# Patient Record
Sex: FEMALE | Race: WHITE | NOT HISPANIC OR LATINO
[De-identification: names, ages, dates, MRNs, and addresses within clinical notes are randomized per-mention and may not be internally consistent; named-entity substitution may affect disease eponyms.]

---

## 2017-03-09 ENCOUNTER — RESULT REVIEW (OUTPATIENT)
Age: 82
End: 2017-03-09

## 2017-03-10 ENCOUNTER — OUTPATIENT (OUTPATIENT)
Dept: OUTPATIENT SERVICES | Facility: HOSPITAL | Age: 82
LOS: 1 days | End: 2017-03-10
Payer: MEDICARE

## 2017-03-10 DIAGNOSIS — D13.2 BENIGN NEOPLASM OF DUODENUM: ICD-10-CM

## 2017-03-10 PROCEDURE — 43239 EGD BIOPSY SINGLE/MULTIPLE: CPT | Mod: XS

## 2017-03-10 PROCEDURE — 43251 EGD REMOVE LESION SNARE: CPT

## 2017-03-13 LAB — SURGICAL PATHOLOGY STUDY: SIGNIFICANT CHANGE UP

## 2017-06-05 ENCOUNTER — APPOINTMENT (OUTPATIENT)
Dept: ENDOCRINOLOGY | Facility: CLINIC | Age: 82
End: 2017-06-05

## 2017-06-05 VITALS
HEART RATE: 65 BPM | HEIGHT: 65 IN | BODY MASS INDEX: 23.16 KG/M2 | OXYGEN SATURATION: 98 % | SYSTOLIC BLOOD PRESSURE: 152 MMHG | WEIGHT: 139 LBS | DIASTOLIC BLOOD PRESSURE: 56 MMHG

## 2017-12-07 ENCOUNTER — APPOINTMENT (OUTPATIENT)
Dept: ENDOCRINOLOGY | Facility: CLINIC | Age: 82
End: 2017-12-07
Payer: MEDICARE

## 2017-12-07 VITALS — WEIGHT: 132 LBS | HEIGHT: 64.25 IN | BODY MASS INDEX: 22.53 KG/M2

## 2017-12-07 VITALS — SYSTOLIC BLOOD PRESSURE: 140 MMHG | DIASTOLIC BLOOD PRESSURE: 68 MMHG | OXYGEN SATURATION: 99 % | HEART RATE: 72 BPM

## 2017-12-07 PROCEDURE — 77080 DXA BONE DENSITY AXIAL: CPT | Mod: GA

## 2017-12-07 PROCEDURE — 99214 OFFICE O/P EST MOD 30 MIN: CPT | Mod: 25

## 2017-12-07 PROCEDURE — ZZZZZ: CPT

## 2017-12-07 RX ORDER — PNV NO.95/FERROUS FUM/FOLIC AC 28MG-0.8MG
TABLET ORAL
Refills: 0 | Status: ACTIVE | COMMUNITY

## 2018-01-03 ENCOUNTER — RX RENEWAL (OUTPATIENT)
Age: 83
End: 2018-01-03

## 2018-01-18 ENCOUNTER — APPOINTMENT (OUTPATIENT)
Dept: RHEUMATOLOGY | Facility: CLINIC | Age: 83
End: 2018-01-18
Payer: MEDICARE

## 2018-01-18 PROCEDURE — 96374 THER/PROPH/DIAG INJ IV PUSH: CPT

## 2018-12-11 ENCOUNTER — APPOINTMENT (OUTPATIENT)
Dept: ENDOCRINOLOGY | Facility: CLINIC | Age: 83
End: 2018-12-11
Payer: MEDICARE

## 2018-12-11 VITALS — SYSTOLIC BLOOD PRESSURE: 130 MMHG | OXYGEN SATURATION: 97 % | DIASTOLIC BLOOD PRESSURE: 74 MMHG | HEART RATE: 70 BPM

## 2018-12-11 VITALS — HEIGHT: 64.25 IN | BODY MASS INDEX: 22.88 KG/M2 | WEIGHT: 134 LBS

## 2018-12-11 PROCEDURE — 99214 OFFICE O/P EST MOD 30 MIN: CPT | Mod: 25

## 2018-12-11 PROCEDURE — 77080 DXA BONE DENSITY AXIAL: CPT | Mod: GA

## 2019-12-04 ENCOUNTER — APPOINTMENT (OUTPATIENT)
Dept: ENDOCRINOLOGY | Facility: CLINIC | Age: 84
End: 2019-12-04
Payer: MEDICARE

## 2019-12-04 VITALS
SYSTOLIC BLOOD PRESSURE: 120 MMHG | BODY MASS INDEX: 22.47 KG/M2 | HEART RATE: 79 BPM | DIASTOLIC BLOOD PRESSURE: 70 MMHG | HEIGHT: 63.9 IN | OXYGEN SATURATION: 98 % | WEIGHT: 130 LBS

## 2019-12-04 PROCEDURE — 77080 DXA BONE DENSITY AXIAL: CPT | Mod: GA

## 2019-12-04 PROCEDURE — ZZZZZ: CPT

## 2019-12-04 PROCEDURE — 99214 OFFICE O/P EST MOD 30 MIN: CPT | Mod: 25

## 2019-12-06 NOTE — PROCEDURE
[FreeTextEntry1] : Bone mineral density: 12/04/2019 \par Indication: vs. 2018 assess response to medication\par Spine: (L1-L3) -2.2 osteopenia +7.3% suspect arthritis\par Total hip: -2.7 osteopenia, no significant change\par Femoral neck: -2.6 osteoporosis, no significant change\par Proximal radius: -2.8 osteoporosis, no significant change\par \par Bone mineral density: 12/11/2018 \par Indication: vs. 2017 assess response to medication \par Spine: (L1-L3) -2.7 osteoporosis, no significant change \par Total hip: -2.7 osteoporosis, no significant change \par Femoral neck: -2.8 osteoporosis, no significant change \par Proximal radius: -2.6 osteoporosis, no significant change \par \par Bone mineral density 12/7/17\par indication: assess response to medication \par spine -2.6,osteoporosis, appears improved compared to prior outside study\par total hip  -2.7, osteoporosis, no significant change \par femoral neck -2.7 osteoporosis, no significant change \par proximal radius -2.9 osteoporosis No prior

## 2019-12-06 NOTE — END OF VISIT
[FreeTextEntry3] : I, Candido Mcallister, authored this note working as a medical scribe for Dr. Wellington.  12/04/2019.  3:30PM. This note was authored by the medical scribe for me. I have reviewed, edited, and revised the note as needed. I am in agreement with the exam findings, imaging findings, and treatment plan.  Jona Wellington MD

## 2019-12-06 NOTE — HISTORY OF PRESENT ILLNESS
[Zoledronic Acid (Zometa)] : Zoledronic Acid [Calcium (supplements)] : calcium from a dietary supplement [Vitamin D (supplements)] : Vitamin D as a dietary supplement [Patient taking Meds Correctly] : Patient is taking meds correctly [Kidney Stones] : a history of kidney stones [Regular Dental Follow-Up] : regular dental follow-up [FreeTextEntry1] : f/u 89 year old female with osteoporosis.\par \par Pt appears to have had low bone density for several years, at least since 2008, though she was not treated at that time. No h/o fx. Repeat bone density 2016 was not read correctly as they included lumbar spine 4 which is clearly falsely positive on my review. . L1-L3 are consistent with osteoporosis. L fem neck: -2.2 R fem neck: -2.5 L tot hip: -2.5 R tot hip:-2.7. Pt had a single episode of kidney stone 1986 with no recurrence. Pt currently has active GERD. Had first dose of Reclast 12/2016, 2nd dose of IV Reclast in Jan 2018. Taking correctly, tolerating well. No thigh pain, no interval fx, no APR. No ONJ.\par \par Endocrine hx is notable for hypothyroidism for many years currently on LT4 125 mcg. No sx of hypothyroidism or hyperthyroidism on this dose. No raciness, shakiness, heat/cold intolerance, tiredness, or fatigue. [Disordered Eating] : no past or present history of disordered eating [Taking Steroids] : no past or present history of taking steroids [Family History of Osteoporosis] : no family history of osteoporosis [Family History of Breast Cancer] : no family history of breast cancer [Family History of Hip Fracture] : no family history of hip fracture [Hyperparathyroidism] : no hyperparathyroidism [History of Radiation Therapy] : no history of radiation therapy [Previous Fragility Fracture] : no previous fragility fracture

## 2019-12-06 NOTE — ASSESSMENT
[Bisphosphonate Therapy] : Risks  and benefits of bisphosphonate therapy were  discussed with the patient including gastroesophageal irritation, osteonecrosis of the jaw, and atypical femur fractures, and acute phase reaction [FreeTextEntry1] : 88 y/o female with \par \par 1. Osteoporosis: Had one dose of Reclast 12/2016, tolerated well. No APR. Still has active reflux disease . No interval fx. Bone mineral density 2017 appeared improved in spine and stable in the hip. Pt elected to take a second dose of IV Reclast in Jan 2018. Repeat BMD 12/2018 indicates stability in all sites. BMD 12/2019 indicates low but stable osteoporosis in femoral neck and proximal radius and osteopenia in spine and total hip. Recommend the pt receive another dose of IV Reclast. Risks and benefits discussed. All questions were answered. Pt agrees and understands to receive next dose of IV Reclast. Prescription sent out.\par \par 2. Hypothyroidism: clinically and chemically euthyroid on LT4 125. No raciness, shakiness, heat/cold intolerance, tiredness, or fatigue.\par \par Labs from Dr. Moe.\par \par f/u in 1 year

## 2019-12-06 NOTE — PHYSICAL EXAM
[Alert] : alert [No Acute Distress] : no acute distress [Well Nourished] : well nourished [Well Developed] : well developed [Normal Sclera/Conjunctiva] : normal sclera/conjunctiva [EOMI] : extra ocular movement intact [No Proptosis] : no proptosis [Thyroid Not Enlarged] : the thyroid was not enlarged [Normal Oropharynx] : the oropharynx was normal [No Thyroid Nodules] : there were no palpable thyroid nodules [No Respiratory Distress] : no respiratory distress [No Accessory Muscle Use] : no accessory muscle use [Clear to Auscultation] : lungs were clear to auscultation bilaterally [Murmurs] : no murmurs [No Edema] : there was no peripheral edema [Normal Bowel Sounds] : normal bowel sounds [Not Tender] : non-tender [Soft] : abdomen soft [Post Cervical Nodes] : posterior cervical nodes [Not Distended] : not distended [Anterior Cervical Nodes] : anterior cervical nodes [Normal] : normal and non tender [Kyphosis] : kyphosis present [No Spinal Tenderness] : no spinal tenderness [No Stigmata of Cushings Syndrome] : no stigmata of cushings syndrome [Normal Gait] : normal gait [Normal Strength/Tone] : muscle strength and tone were normal [No Rash] : no rash [Normal Reflexes] : deep tendon reflexes were 2+ and symmetric [No Tremors] : no tremors [Oriented x3] : oriented to person, place, and time [Acanthosis Nigricans] : no acanthosis nigricans [de-identified] : elderly female [de-identified] : 2/6 systolic murmur [de-identified] : ribs in pelvis, kyphosis

## 2020-02-25 ENCOUNTER — APPOINTMENT (OUTPATIENT)
Dept: RHEUMATOLOGY | Facility: CLINIC | Age: 85
End: 2020-02-25
Payer: MEDICARE

## 2020-02-25 PROCEDURE — 96374 THER/PROPH/DIAG INJ IV PUSH: CPT

## 2020-12-02 ENCOUNTER — APPOINTMENT (OUTPATIENT)
Dept: ENDOCRINOLOGY | Facility: CLINIC | Age: 85
End: 2020-12-02
Payer: MEDICARE

## 2020-12-02 VITALS
DIASTOLIC BLOOD PRESSURE: 70 MMHG | TEMPERATURE: 98 F | HEART RATE: 65 BPM | BODY MASS INDEX: 21.62 KG/M2 | WEIGHT: 122 LBS | HEIGHT: 63 IN | SYSTOLIC BLOOD PRESSURE: 130 MMHG | OXYGEN SATURATION: 98 %

## 2020-12-02 PROCEDURE — 99214 OFFICE O/P EST MOD 30 MIN: CPT

## 2020-12-03 NOTE — ASSESSMENT
[Bisphosphonate Therapy] : Risks  and benefits of bisphosphonate therapy were  discussed with the patient including gastroesophageal irritation, osteonecrosis of the jaw, and atypical femur fractures, and acute phase reaction [FreeTextEntry1] : 89 y/o female with \par \par 1. Osteoporosis: Had one dose of Reclast 12/2016, tolerated well. No APR. Still has active reflux disease . No interval fx. Bone mineral density 2017 appeared improved in spine and stable in the hip. Pt elected to take a second dose of IV Reclast in Jan 2018. Repeat BMD 12/2018 indicates stability in all sites. BMD 12/2019 indicated low but stable osteoporosis in femoral neck and proximal radius and osteopenia in spine and total hip. \par She needs third dose of intravenous Reclast February 2020.  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 Hypothyroidism: clinically and chemically euthyroid on LT4 125. No raciness, shakiness, heat/cold intolerance, tiredness, or fatigue.\par \par Labs from Dr. Moe.\par \par f/u in 6 mos repeat BMD next visit

## 2020-12-03 NOTE — HISTORY OF PRESENT ILLNESS
[Zoledronic Acid (Zometa)] : Zoledronic Acid [Calcium (supplements)] : calcium from a dietary supplement [Vitamin D (supplements)] : Vitamin D as a dietary supplement [Patient taking Meds Correctly] : Patient is taking meds correctly [Kidney Stones] : a history of kidney stones [Regular Dental Follow-Up] : regular dental follow-up [FreeTextEntry1] : \par Pt appears to have had low bone density for several years, at least since 2008, though she was not treated at that time. No h/o fx. Repeat bone density 2016 was not read correctly as they included lumbar spine 4 which is clearly falsely positive on my review. . L1-L3 are consistent with osteoporosis. L fem neck: -2.2 R fem neck: -2.5 L tot hip: -2.5 R tot hip:-2.7. Pt had a single episode of kidney stone 1986 with no recurrence. Pt currently has active GERD. Had first dose of Reclast 12/2016, 2nd dose of IV Reclast in Jan 2018.  ,\par \par Reclast Feb 2020  tolerating well. No thigh pain, no interval fx, no APR. No ONJ.\par Endocrine hx is notable for hypothyroidism for many years currently on LT4 125 mcg. No sx of hypothyroidism or hyperthyroidism on this dose. No raciness, shakiness, heat/cold intolerance, tiredness, or fatigue. [Disordered Eating] : no past or present history of disordered eating [Taking Steroids] : no past or present history of taking steroids [Family History of Osteoporosis] : no family history of osteoporosis [Family History of Breast Cancer] : no family history of breast cancer [Family History of Hip Fracture] : no family history of hip fracture [Hyperparathyroidism] : no hyperparathyroidism [History of Radiation Therapy] : no history of radiation therapy [Previous Fragility Fracture] : no previous fragility fracture

## 2020-12-03 NOTE — PHYSICAL EXAM
[Alert] : alert [Well Nourished] : well nourished [No Acute Distress] : no acute distress [Well Developed] : well developed [Normal Sclera/Conjunctiva] : normal sclera/conjunctiva [EOMI] : extra ocular movement intact [No Proptosis] : no proptosis [Thyroid Not Enlarged] : the thyroid was not enlarged [No Thyroid Nodules] : no palpable thyroid nodules [Clear to Auscultation] : lungs were clear to auscultation bilaterally [Normal S1, S2] : normal S1 and S2 [Normal Rate] : heart rate was normal [Regular Rhythm] : with a regular rhythm [No Edema] : no peripheral edema [Pedal Pulses Normal] : the pedal pulses are present [Normal Bowel Sounds] : normal bowel sounds [Not Tender] : non-tender [Not Distended] : not distended [Soft] : abdomen soft [Normal Anterior Cervical Nodes] : no anterior cervical lymphadenopathy [Normal Posterior Cervical Nodes] : no posterior cervical lymphadenopathy [No Spinal Tenderness] : no spinal tenderness [No Stigmata of Cushings Syndrome] : no stigmata of Cushings Syndrome [Normal Gait] : normal gait [Normal Strength/Tone] : muscle strength and tone were normal [No Rash] : no rash [Acanthosis Nigricans] : no acanthosis nigricans [Normal Reflexes] : deep tendon reflexes were 2+ and symmetric [No Tremors] : no tremors [Oriented x3] : oriented to person, place, and time [de-identified] : Elderly female [de-identified] : 2/6 systolic murmur [de-identified] : Severe kyphosis ribs inside pelvis

## 2021-06-08 ENCOUNTER — APPOINTMENT (OUTPATIENT)
Dept: ENDOCRINOLOGY | Facility: CLINIC | Age: 86
End: 2021-06-08
Payer: MEDICARE

## 2021-06-08 VITALS
SYSTOLIC BLOOD PRESSURE: 160 MMHG | TEMPERATURE: 98.4 F | WEIGHT: 128 LBS | OXYGEN SATURATION: 98 % | DIASTOLIC BLOOD PRESSURE: 62 MMHG | HEIGHT: 62.6 IN | HEART RATE: 63 BPM | BODY MASS INDEX: 22.97 KG/M2

## 2021-06-08 PROCEDURE — 99072 ADDL SUPL MATRL&STAF TM PHE: CPT

## 2021-06-08 PROCEDURE — ZZZZZ: CPT

## 2021-06-08 PROCEDURE — 77080 DXA BONE DENSITY AXIAL: CPT

## 2021-06-08 PROCEDURE — 99214 OFFICE O/P EST MOD 30 MIN: CPT | Mod: 25

## 2021-06-08 NOTE — HISTORY OF PRESENT ILLNESS
[Zoledronic Acid (Zometa)] : Zoledronic Acid [Calcium (supplements)] : calcium from a dietary supplement [Vitamin D (supplements)] : Vitamin D as a dietary supplement [Kidney Stones] : a history of kidney stones [Regular Dental Follow-Up] : regular dental follow-up [FreeTextEntry1] : No significant interval health changes. No interval surgery, hospitalizations, fractures, or change in medications.\par \par Pt appears to have had low bone density for several years, at least since 2008, though she was not treated at that time. No h/o fx. Repeat bone density 2016 was not read correctly as they included lumbar spine 4 which is clearly falsely positive on my review. . L1-L3 are consistent with osteoporosis. L fem neck: -2.2 R fem neck: -2.5 L tot hip: -2.5 R tot hip:-2.7. Pt had a single episode of kidney stone 1986 with no recurrence. Pt currently has active GERD. Had first dose of Reclast 12/2016, 2nd dose of IV Reclast in Jan 2018. Pt had third dose of intravenous Reclast February 2020. Tolerated well. No thigh pain, no interval fx, no APR. Last DDS within past 6 months. No ONJ. MRI 8/2020 indicates L2 compression fx. Pt still c/o back pain.\par \par Endocrine hx is notable for hypothyroidism for many years currently on LT4 125 mcg daily.. No sx of hypothyroidism or hyperthyroidism on this No local neck pain. No dysphagia or dysphonia. No raciness, shakiness, heat/cold intolerance, tiredness, or fatigue. No palpitations, tremors, or sudden weight gain/loss. [Disordered Eating] : no past or present history of disordered eating [Taking Steroids] : no past or present history of taking steroids [Family History of Osteoporosis] : no family history of osteoporosis [Family History of Breast Cancer] : no family history of breast cancer [Family History of Hip Fracture] : no family history of hip fracture [Hyperparathyroidism] : no hyperparathyroidism [History of Radiation Therapy] : no history of radiation therapy [Previous Fragility Fracture] : no previous fragility fracture

## 2021-06-08 NOTE — END OF VISIT
[FreeTextEntry3] : I, Candido Mcallister, authored this note working as a medical scribe for Dr. Wellington.  06/08/2021. 11:30AM. This note was authored by the medical scribe for me. I have reviewed, edited, and revised the note as needed. I am in agreement with the exam findings, imaging findings, and treatment plan.  Jona Wellington MD

## 2021-06-08 NOTE — PROCEDURE
[FreeTextEntry1] : Bone mineral density: 06/08/2021 \par Indication: vs. 2019 assess response to medication\par Spine: not performed\par Total hip: -3.0 osteoporosis, -5.8%\par Femoral neck: -3.0 osteoporosis, -8.5%\par Proximal radius: -2.6 osteoporosis, no significant change\par \par Bone mineral density: 12/04/2019 \par Indication: vs. 2018 assess response to medication\par Spine: (L1-L3) -2.2 osteopenia +7.3% suspect arthritis\par Total hip: -2.7 osteopenia, no significant change\par Femoral neck: -2.6 osteoporosis, no significant change\par Proximal radius: -2.8 osteoporosis, no significant change\par \par Bone mineral density: 12/11/2018 \par Indication: vs. 2017 assess response to medication \par Spine: (L1-L3) -2.7 osteoporosis, no significant change \par Total hip: -2.7 osteoporosis, no significant change \par Femoral neck: -2.8 osteoporosis, no significant change \par Proximal radius: -2.6 osteoporosis, no significant change \par \par Bone mineral density 12/7/17\par indication: assess response to medication \par spine -2.6,osteoporosis, appears improved compared to prior outside study\par total hip  -2.7, osteoporosis, no significant change \par femoral neck -2.7 osteoporosis, no significant change \par proximal radius -2.9 osteoporosis No prior

## 2021-06-08 NOTE — ASSESSMENT
[Denosumab Therapy] : Risks  and benefits of denosumab therapy were discussed with the patient including eczema, cellulitis, osteonecrosis of the jaw and atypical femur fractures [FreeTextEntry1] : 89 y/o female with \par \par 1. Osteoporosis: Had one dose of Reclast 12/2016, tolerated well. No APR. Still has active reflux disease . No interval fx. Bone mineral density 2017 appeared improved in spine and stable in the hip. Pt elected to take a second dose of IV Reclast in Jan 2018. Repeat BMD 12/2018 indicates stability in all sites. BMD 12/2019 indicated low but stable osteoporosis in femoral neck and proximal radius and osteopenia in spine and total hip. Pt had third dose of intravenous Reclast February 2020. Tolerated well. No thigh pain, no interval fx, no APR. No ONJ. BMD 6/2021 indicates worsened low osteoporosis in hip and stable osteoporosis in proximal radius. BMD results reviewed w/ pt.\par \par Options of therapy discussed. Discussed transitioning to twice a year Prolia. Risks and benefits discussed including thigh pain, interval fx, and ONJ. I discussed that pt cannot stop Prolia without expecting rapid bone loss and increase in risk for future fx unless they transition to another rx. Furthermore, there is 10 year safety data for Prolia. All questions were answered. Rx information handout provided. Pt understands and elects to start Prolia. Pt will have shot in [email or web address] office as it is closer to her home.\par \par 2. 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 Hypothyroidism: clinically and chemically euthyroid on LT4 125 mcg daily. No local neck pain. No dysphagia or dysphonia. No raciness, shakiness, heat/cold intolerance, tiredness, or fatigue. No palpitations, tremors, or sudden weight gain/loss. \par Call Dr. Moe for labs.  [phone]\par \par F/u in 1 month at [facility] for Prolia.

## 2021-06-08 NOTE — PHYSICAL EXAM
[Alert] : alert [Well Nourished] : well nourished [No Acute Distress] : no acute distress [Well Developed] : well developed [Normal Sclera/Conjunctiva] : normal sclera/conjunctiva [EOMI] : extra ocular movement intact [No Proptosis] : no proptosis [Thyroid Not Enlarged] : the thyroid was not enlarged [No Thyroid Nodules] : no palpable thyroid nodules [Clear to Auscultation] : lungs were clear to auscultation bilaterally [Normal S1, S2] : normal S1 and S2 [Normal Rate] : heart rate was normal [Regular Rhythm] : with a regular rhythm [No Edema] : no peripheral edema [Normal Bowel Sounds] : normal bowel sounds [Not Tender] : non-tender [Not Distended] : not distended [Soft] : abdomen soft [Normal Anterior Cervical Nodes] : no anterior cervical lymphadenopathy [No Spinal Tenderness] : no spinal tenderness [Kyphosis] : kyphosis present [No Stigmata of Cushings Syndrome] : no stigmata of Cushings Syndrome [Normal Gait] : normal gait [Normal Reflexes] : deep tendon reflexes were 2+ and symmetric [No Tremors] : no tremors [Oriented x3] : oriented to person, place, and time [de-identified] : Elderly female [de-identified] : 2/6 systolic murmur [de-identified] : Severe kyphosis, ribs inside pelvis

## 2021-06-29 ENCOUNTER — APPOINTMENT (OUTPATIENT)
Dept: ENDOCRINOLOGY | Facility: CLINIC | Age: 86
End: 2021-06-29
Payer: MEDICARE

## 2021-06-29 VITALS
TEMPERATURE: 98 F | SYSTOLIC BLOOD PRESSURE: 169 MMHG | HEART RATE: 62 BPM | WEIGHT: 124 LBS | DIASTOLIC BLOOD PRESSURE: 65 MMHG | BODY MASS INDEX: 22.25 KG/M2 | HEIGHT: 62.6 IN

## 2021-06-29 PROCEDURE — 99072 ADDL SUPL MATRL&STAF TM PHE: CPT

## 2021-06-29 PROCEDURE — 96401 CHEMO ANTI-NEOPL SQ/IM: CPT

## 2021-06-29 NOTE — PROCEDURE
[FreeTextEntry1] : Pt. seen by Dr. Wellington here for Prolia injection. Given today without complications. Pt. will follow-up with Dr. Wellington in 6 months.

## 2023-05-24 ENCOUNTER — APPOINTMENT (OUTPATIENT)
Dept: WOUND CARE | Facility: CLINIC | Age: 88
End: 2023-05-24
Payer: COMMERCIAL

## 2023-05-24 ENCOUNTER — OUTPATIENT (OUTPATIENT)
Dept: OUTPATIENT SERVICES | Facility: HOSPITAL | Age: 88
LOS: 1 days | End: 2023-05-24
Payer: MEDICARE

## 2023-05-24 VITALS
HEART RATE: 60 BPM | SYSTOLIC BLOOD PRESSURE: 143 MMHG | OXYGEN SATURATION: 99 % | TEMPERATURE: 97.2 F | DIASTOLIC BLOOD PRESSURE: 66 MMHG | RESPIRATION RATE: 16 BRPM
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
## 2023-05-24 DIAGNOSIS — Z87.81 PERSONAL HISTORY OF (HEALED) TRAUMATIC FRACTURE: ICD-10-CM

## 2023-05-24 DIAGNOSIS — S81.802A UNSPECIFIED OPEN WOUND, LEFT LOWER LEG, INITIAL ENCOUNTER: ICD-10-CM
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
## 2023-05-24 PROCEDURE — 11042 DBRDMT SUBQ TIS 1ST 20SQCM/<: CPT

## 2023-05-24 PROCEDURE — 29581 APPL MULTLAYER CMPRN SYS LEG: CPT | Mod: LT

## 2023-05-24 PROCEDURE — 99205 OFFICE O/P NEW HI 60 MIN: CPT | Mod: 25

## 2023-05-24 PROCEDURE — G0463: CPT | Mod: 25

## 2023-05-24 NOTE — HISTORY OF PRESENT ILLNESS
[FreeTextEntry1] : Ms. VASU CARBAJAL   presents to the office with a wound since 5/21/23.  She was washing her windows when she fell off a milkcrate. The wound is located on  the LLE .  The patient has complaints of poor wound healing   The patient has been dressing the wound with silvadene.  The patient denies fevers or chills.  The patient has localized pain to the wound upon dressing changes.  The patient has no other complaints or associated symptoms.  .\par

## 2023-05-24 NOTE — PHYSICAL EXAM
[Normal Breath Sounds] : Normal breath sounds [2+] : left 2+ [Ankle Swelling (On Exam)] : present [Ankle Swelling Bilaterally] : bilaterally  [Skin Ulcer] : ulcer [Alert] : alert [Oriented to Person] : oriented to person [Oriented to Place] : oriented to place [Oriented to Time] : oriented to time [Calm] : calm [Please See PDF for [employer]] : Please See PDF for [employer]. [JVD] : no jugular venous distention  [Abdomen Tenderness] : ~T ~M No abdominal tenderness [de-identified] : NAD, ambulatory [de-identified] : AT [de-identified] : supple [de-identified] : soft

## 2023-05-24 NOTE — ASSESSMENT
[FreeTextEntry1] : Wound Assessment and Plan:\par \par The patient presents with a wound to the LLE.  Swelling noted to the extremity.  h/o compression stocking use  h/o varicose veins and BLLE knee replacement.\par ROLAND/PVR and standing venous reflux study scheduled.\par No clinical sign of infection\par Recommendation:\par \par Apply lidocaine or topical anesthetic if needed to reduce pain upon washing the wound.\par Wash wound with ----VASHE or Dove skin sensitive soap and clean water \par Apply ---medical grade honey/\par Apply ----multi-layer compression bandage/ compression stocking/ ACE bandage\par Change dressing ---daily\par Leg elevation as tolerated\par Encouraged ambulation or exercise.\par Optimization of nutrition.\par Offloading to the wound site.\par \par \par -----Wound supplies ordered via DME\par Patient given contact information to DME\par \par -----Homecare orders in place\par \par Follow up appointment scheduled for 1 week\par \par TeleHealth Services discussed with the patient and/or family.  Discharge instructions given including download of [facility] information regarding:\par 1)  [facility] to obtain medical records\par 2)  [facility] to conduct Face-to-Face TeleHealth visit\par 3)  [employer] for the Patient (patient takes a picture of their wound which is sent to the patient's chart for review)\par

## 2023-06-07 ENCOUNTER — APPOINTMENT (OUTPATIENT)
Dept: WOUND CARE | Facility: CLINIC | Age: 88
End: 2023-06-07
Payer: COMMERCIAL

## 2023-06-07 ENCOUNTER — OUTPATIENT (OUTPATIENT)
Dept: OUTPATIENT SERVICES | Facility: HOSPITAL | Age: 88
LOS: 1 days | End: 2023-06-07
Payer: MEDICARE

## 2023-06-07 VITALS — TEMPERATURE: 97.3 F

## 2023-06-07 PROCEDURE — 99213 OFFICE O/P EST LOW 20 MIN: CPT

## 2023-06-07 PROCEDURE — G0463: CPT

## 2023-06-07 RX ORDER — CLOTRIMAZOLE AND BETAMETHASONE DIPROPIONATE 10; .5 MG/G; MG/G
1-0.05 CREAM TOPICAL TWICE DAILY
Qty: 1 | Refills: 2 | Status: ACTIVE | COMMUNITY
Start: 2023-06-07 | End: 1900-01-01

## 2023-06-07 NOTE — PHYSICAL EXAM
[Normal Breath Sounds] : Normal breath sounds [2+] : left 2+ [Ankle Swelling (On Exam)] : present [Ankle Swelling Bilaterally] : bilaterally  [Skin Ulcer] : ulcer [Alert] : alert [Oriented to Person] : oriented to person [Oriented to Place] : oriented to place [Oriented to Time] : oriented to time [Calm] : calm [Please See PDF for [employer]] : Please See PDF for [employer]. [JVD] : no jugular venous distention  [Abdomen Tenderness] : ~T ~M No abdominal tenderness [de-identified] : NAD, ambulatory [de-identified] : AT [de-identified] : supple [de-identified] : soft

## 2023-06-07 NOTE — ASSESSMENT
[FreeTextEntry1] : Wound Assessment and Plan:\par \par The patient presents with a wound to the LLE.  Swelling noted to the extremity.  h/o compression stocking use  h/o varicose veins and BLLE knee replacement.\par ROLAND/PVR and standing venous reflux study scheduled.\par No clinical sign of infection\par Recommendation:\par \par Apply lidocaine or topical anesthetic if needed to reduce pain upon washing the wound.\par Wash wound with ----VASHE or Dove skin sensitive soap and clean water \par Apply ---medical grade honey/\par Apply ----multi-layer compression bandage/ compression stocking/ ACE bandage\par Change dressing ---daily\par Leg elevation as tolerated\par Encouraged ambulation or exercise.\par Optimization of nutrition.\par Offloading to the wound site.\par \par \par -----Wound supplies ordered via DME\par Patient given contact information to [facility]\par \par \par -----Homecare orders in place\par \par Follow up appointment scheduled for 1 week\par \par TeleHealth Services discussed with the patient and/or family.  Discharge instructions given including download of [facility] information regarding:\par 1)  [facility] to obtain medical records\par 2)  [facility] to conduct Face-to-Face TeleHealth visit\par 3)  [employer] for the Patient (patient takes a picture of their wound which is sent to the patient's chart for review)\par \par \par 06/07/2023\par The patient follow  with a wound to the LLE.  Swelling noted to the extremity. h/o varicose veins and BLLE knee replacement.\par Tendon exposed in the wound, maceration noted to periwound\par S/p mechanical debridment\par /\par The patient was positioned to allow for optimization of imaging. The fluorescence imaging device was placed 8-12 cm from the patient and the clinical darkness required for imaging was obtained by a dark drape. The wound measured () on the () . The Sapheon i:X fluorescence imaging device was used to report presence and location of cyan fluorescence, indicative of bacteria at loads greater than 104 CFU/g in real-time. Images reported to have cyan fluorescence. The wound was mechanically cleansed with Dakins 0.125% and underwent excisional debridement. Fluorescence images acquired after cleansing demonstrated reduction in bacteria.\par \par Vascular studies ordered\par  Plan for  application of (puraply). Wound has shown improvement through (increased granulation and/or decreased size).\par Wound is free from infection, drainage and necrotic tissue. (Patient has adequate blood supply as demonstrated by palpable pulses and an ROLAND of () from (date). \par Wound has been treated with standards of care for (3) weeks including (compression, offloading, debridement’s).\par \par \par

## 2023-06-07 NOTE — PLAN
[FreeTextEntry1] : 06/07/2023\par wash with vashe, apply medihoney, xtrasorb, multilayer dressing\par Follow up on vascular studies\par skin sub ordered\par follow up in 2 weeks

## 2023-06-07 NOTE — REVIEW OF SYSTEMS
Admission Reconciliation is Completed  Discharge Reconciliation is Not Complete Admission Reconciliation is Completed  Discharge Reconciliation is Completed [As Noted in HPI] : as noted in HPI [Skin Lesions] : skin lesion [Skin Wound] : skin wound [Negative] : Heme/Lymph

## 2023-06-09 DIAGNOSIS — I83.893 VARICOSE VEINS OF BILATERAL LOWER EXTREMITIES WITH OTHER COMPLICATIONS: ICD-10-CM

## 2023-06-09 DIAGNOSIS — L97.922 NON-PRESSURE CHRONIC ULCER OF UNSPECIFIED PART OF LEFT LOWER LEG WITH FAT LAYER EXPOSED: ICD-10-CM

## 2023-06-12 ENCOUNTER — APPOINTMENT (OUTPATIENT)
Dept: ENDOCRINOLOGY | Facility: CLINIC | Age: 88
End: 2023-06-12
Payer: MEDICARE

## 2023-06-12 VITALS
HEART RATE: 71 BPM | WEIGHT: 115 LBS | DIASTOLIC BLOOD PRESSURE: 62 MMHG | BODY MASS INDEX: 21.16 KG/M2 | HEIGHT: 62 IN | OXYGEN SATURATION: 96 % | SYSTOLIC BLOOD PRESSURE: 150 MMHG

## 2023-06-12 PROCEDURE — ZZZZZ: CPT

## 2023-06-12 PROCEDURE — 99214 OFFICE O/P EST MOD 30 MIN: CPT | Mod: 25

## 2023-06-12 PROCEDURE — 77080 DXA BONE DENSITY AXIAL: CPT

## 2023-06-12 PROCEDURE — 96401 CHEMO ANTI-NEOPL SQ/IM: CPT

## 2023-06-12 RX ORDER — DENOSUMAB 60 MG/ML
60 INJECTION SUBCUTANEOUS
Qty: 1 | Refills: 0 | Status: COMPLETED | OUTPATIENT
Start: 2023-06-12

## 2023-06-12 RX ADMIN — DENOSUMAB 60 MG/ML: 60 INJECTION SUBCUTANEOUS at 00:00

## 2023-06-12 NOTE — ASSESSMENT
[Denosumab Therapy] : Risks  and benefits of denosumab therapy were discussed with the patient including eczema, cellulitis, osteonecrosis of the jaw and atypical femur fractures [FreeTextEntry1] : 89 y/o female with \par \par 1. Osteoporosis: Had one dose of Reclast 12/2016, tolerated well. No APR. Still has active reflux disease . No interval fx. Bone mineral density 2017 appeared improved in spine and stable in the hip. Pt elected to take a second dose of IV Reclast in Jan 2018. Repeat BMD 12/2018 indicates stability in all sites. BMD 12/2019 indicated low but stable osteoporosis in femoral neck and proximal radius and osteopenia in spine and total hip. Pt had third dose of intravenous Reclast February 2020. Tolerated well. No thigh pain, no interval fx, no APR. No ONJ. BMD 6/2021 indicates worsened low osteoporosis in hip and stable osteoporosis in proximal radius. BMD results reviewed w/ pt.\par The patient had 1 dose of Prolia 2021 but did not return for follow-up.  It is unclear if this was due to concerns about COVID or lack of understanding of need for return.  Current bone density 2023 significantly reduced versus prior studies.\par Patient strongly advised of the need for treatment and for routine to 6-month follow-up of Prolia.  The risk of rapid bone loss if patient misses dose emphasized.\par Prolia buy and bill \par \par 2. 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 Hypothyroidism: clinically and chemically euthyroid on LT4 125 mcg daily. No local neck pain. No dysphagia or dysphonia. No raciness, shakiness, heat/cold intolerance, tiredness, or fatigue. No palpitations, tremors, or sudden weight gain/loss. \par Call Dr. Moe for labs.  [phone]\par Follow-up 6 months for Prolia

## 2023-06-12 NOTE — HISTORY OF PRESENT ILLNESS
[Zoledronic Acid (Zometa)] : Zoledronic Acid [Calcium (supplements)] : calcium from a dietary supplement [Vitamin D (supplements)] : Vitamin D as a dietary supplement [Kidney Stones] : a history of kidney stones [Regular Dental Follow-Up] : regular dental follow-up [FreeTextEntry1] :  Late follow-up for 89-year-old female for osteoporosis.  Patient received Prolia in the [facility]  office June 2021 but has not returned for any treatment since then.  Patient denies any interval fractures.\par \par The patient had a TAVR at  [facility] which apparently went well. \par  Prior history:\par Pt appears to have had low bone density for several years, at least since 2008, though she was not treated at that time. No h/o fx. Repeat bone density 2016 was not read correctly as they included lumbar spine 4 which is clearly falsely positive on my review. . L1-L3 are consistent with osteoporosis. L fem neck: -2.2 R fem neck: -2.5 L tot hip: -2.5 R tot hip:-2.7. Pt had a single episode of kidney stone 1986 with no recurrence. Pt currently has active GERD. Had first dose of Reclast 12/2016, 2nd dose of IV Reclast in Jan 2018. Pt had third dose of intravenous Reclast February 2020. Tolerated well. No thigh pain, no interval fx, no APR. Last DDS within past 6 months. No ONJ. MRI 8/2020 indicates L2 compression fx. Pt still c/o back pain.\par \par Endocrine hx is notable for hypothyroidism for many years currently on LT4 125 mcg daily.. No sx of hypothyroidism or hyperthyroidism on this No local neck pain. No dysphagia or dysphonia. No raciness, shakiness, heat/cold intolerance, tiredness, or fatigue. No palpitations, tremors, or sudden weight gain/loss. [Disordered Eating] : no past or present history of disordered eating [Taking Steroids] : no past or present history of taking steroids [Family History of Osteoporosis] : no family history of osteoporosis [Family History of Breast Cancer] : no family history of breast cancer [Family History of Hip Fracture] : no family history of hip fracture [Hyperparathyroidism] : no hyperparathyroidism [History of Radiation Therapy] : no history of radiation therapy [Previous Fragility Fracture] : no previous fragility fracture

## 2023-06-12 NOTE — PHYSICAL EXAM
[Alert] : alert [Well Nourished] : well nourished [No Acute Distress] : no acute distress [Well Developed] : well developed [Normal Sclera/Conjunctiva] : normal sclera/conjunctiva [EOMI] : extra ocular movement intact [No Proptosis] : no proptosis [Thyroid Not Enlarged] : the thyroid was not enlarged [No Thyroid Nodules] : no palpable thyroid nodules [Clear to Auscultation] : lungs were clear to auscultation bilaterally [Normal S1, S2] : normal S1 and S2 [Normal Rate] : heart rate was normal [Regular Rhythm] : with a regular rhythm [No Edema] : no peripheral edema [Normal Bowel Sounds] : normal bowel sounds [Not Tender] : non-tender [Not Distended] : not distended [Soft] : abdomen soft [Normal Anterior Cervical Nodes] : no anterior cervical lymphadenopathy [No Spinal Tenderness] : no spinal tenderness [Kyphosis] : kyphosis present [No Stigmata of Cushings Syndrome] : no stigmata of Cushings Syndrome [Normal Gait] : normal gait [Normal Reflexes] : deep tendon reflexes were 2+ and symmetric [No Tremors] : no tremors [Oriented x3] : oriented to person, place, and time [No Murmurs] : no murmurs [de-identified] : Elderly female [de-identified] : Severe kyphosis, ribs inside pelvis

## 2023-06-12 NOTE — PROCEDURE
[FreeTextEntry1] : Bone mineral density: 06/12/2023\par indication: Comparison to 2021\par spine not performed\par total hip -3.5 osteoporosis -9.9%\par femoral neck -3.2 osteoporosis no significant change versus 2021 but significantly decreased versus 2019\par proximal radius -3.1 osteoporosis -5.5%\par \par Bone mineral density: 06/08/2021 \par Indication: vs. 2019 assess response to medication\par Spine: not performed\par Total hip: -3.0 osteoporosis, -5.8%\par Femoral neck: -3.0 osteoporosis, -8.5%\par Proximal radius: -2.6 osteoporosis, no significant change\par \par Bone mineral density: 12/04/2019 \par Indication: vs. 2018 assess response to medication\par Spine: (L1-L3) -2.2 osteopenia +7.3% suspect arthritis\par Total hip: -2.7 osteopenia, no significant change\par Femoral neck: -2.6 osteoporosis, no significant change\par Proximal radius: -2.8 osteoporosis, no significant change\par \par Bone mineral density: 12/11/2018 \par Indication: vs. 2017 assess response to medication \par Spine: (L1-L3) -2.7 osteoporosis, no significant change \par Total hip: -2.7 osteoporosis, no significant change \par Femoral neck: -2.8 osteoporosis, no significant change \par Proximal radius: -2.6 osteoporosis, no significant change \par \par Bone mineral density 12/7/17\par indication: assess response to medication \par spine -2.6,osteoporosis, appears improved compared to prior outside study\par total hip  -2.7, osteoporosis, no significant change \par femoral neck -2.7 osteoporosis, no significant change \par proximal radius -2.9 osteoporosis No prior

## 2023-06-21 ENCOUNTER — APPOINTMENT (OUTPATIENT)
Dept: WOUND CARE | Facility: CLINIC | Age: 88
End: 2023-06-21
Payer: MEDICARE

## 2023-06-21 ENCOUNTER — OUTPATIENT (OUTPATIENT)
Dept: OUTPATIENT SERVICES | Facility: HOSPITAL | Age: 88
LOS: 1 days | End: 2023-06-21
Payer: MEDICARE

## 2023-06-21 PROCEDURE — 15271 SKIN SUB GRAFT TRNK/ARM/LEG: CPT

## 2023-06-21 PROCEDURE — 0598T R-T FLUOR WOUND IMG 1ST SITE: CPT | Mod: LT

## 2023-06-21 PROCEDURE — 0598T R-T FLUOR WOUND IMG 1ST SITE: CPT

## 2023-06-23 DIAGNOSIS — I83.893 VARICOSE VEINS OF BILATERAL LOWER EXTREMITIES WITH OTHER COMPLICATIONS: ICD-10-CM

## 2023-06-23 DIAGNOSIS — S81.802A UNSPECIFIED OPEN WOUND, LEFT LOWER LEG, INITIAL ENCOUNTER: ICD-10-CM

## 2023-06-23 NOTE — PLAN
[FreeTextEntry1] : 06/07/2023\par skin sun applied, veil over it \par drawtex, multilayer dressing\par Follow up on vascular studies\par nursing orders given\par follow up in 2 weeks

## 2023-06-23 NOTE — PHYSICAL EXAM
[Normal Breath Sounds] : Normal breath sounds [2+] : left 2+ [Ankle Swelling (On Exam)] : present [Ankle Swelling Bilaterally] : bilaterally  [Skin Ulcer] : ulcer [Alert] : alert [Oriented to Person] : oriented to person [Oriented to Place] : oriented to place [Oriented to Time] : oriented to time [Calm] : calm [Please See PDF for [employer]] : Please See PDF for [employer]. [JVD] : no jugular venous distention  [Abdomen Tenderness] : ~T ~M No abdominal tenderness [de-identified] : NAD, ambulatory [de-identified] : AT [de-identified] : supple [de-identified] : soft

## 2023-06-23 NOTE — ASSESSMENT
[FreeTextEntry1] : Wound Assessment and Plan:\par \par The patient presents with a wound to the LLE.  Swelling noted to the extremity.  h/o compression stocking use  h/o varicose veins and BLLE knee replacement.\par ROLAND/PVR and standing venous reflux study scheduled.\par No clinical sign of infection\par Recommendation:\par \par Apply lidocaine or topical anesthetic if needed to reduce pain upon washing the wound.\par Wash wound with ----VASHE or Dove skin sensitive soap and clean water \par Apply ---medical grade honey/\par Apply ----multi-layer compression bandage/ compression stocking/ ACE bandage\par Change dressing ---daily\par Leg elevation as tolerated\par Encouraged ambulation or exercise.\par Optimization of nutrition.\par Offloading to the wound site.\par \par \par -----Wound supplies ordered via DME\par [place] [facility] [place] \par \par -----Homecare orders in place\par \par Follow up appointment scheduled for 1 week\par \par TeleHealth Services discussed with the patient and/or family.  Discharge instructions given including download of [facility] information regarding:\par 1)  [employer] [facility] to obtain medical records\par 2)  [facility] to conduct Face-to-Face TeleHealth visit\par 3)  [employer] for the Patient (patient takes a picture of their wound which is sent to the patient's chart for review)\par \par \par 06/07/2023\par The patient follow  with a wound to the LLE.  Swelling noted to the extremity. h/o varicose veins and BLLE knee replacement.\par Tendon exposed in the wound, maceration noted to periwound\par S/p mechanical debridment\par /\par The patient was positioned to allow for optimization of imaging. The fluorescence imaging device was placed 8-12 cm from the patient and the clinical darkness required for imaging was obtained by a dark drape. The wound measured () on the () . The Choisr i:X fluorescence imaging device was used to report presence and location of cyan fluorescence, indicative of bacteria at loads greater than 104 CFU/g in real-time. Images reported to have cyan fluorescence. The wound was mechanically cleansed with Dakins 0.125% and underwent excisional debridement. Fluorescence images acquired after cleansing demonstrated reduction in bacteria.\par \par Vascular studies ordered\par  Plan for  application of (puraply). Wound has shown improvement through (increased granulation and/or decreased size).\par Wound is free from infection, drainage and necrotic tissue. (Patient has adequate blood supply as demonstrated by palpable pulses and an ROLAND of () from (date). \par Wound has been treated with standards of care for (3) weeks including (compression, offloading, debridement’s).\par \par 06/21/2023\par The patient follow  with a wound to the E.  Swelling noted to the extremity. h/o varicose veins and BLLE knee replacement.\par Tendon still  exposed in the wound, hypergranular at the edges, silvernitrated the hypergranular\par S/p excisional  debridement\par \par Wound is free from infection, drainage and necrotic tissue.  Patient has adequate blood supply as demonstrated by palpable pulses and an ROLAND of 1 .\par \par Wound has been treated with standards of care for over 4 weeks including compression, offloading, debridement’s).\par \par \par PuraPlyAM 15 Units  (1) applied today to patient’s (left leg). Wound bed debrided of bioburden and prepped for product application. (1 of pieces and original size of product) obtained. Total product usage was ( 15sq. cm), Total waste (0x sq. cm) due to wound size. Product secured with(sterile strips and bolster dressing.\par \par /\par The patient was positioned to allow for optimization of imaging. The fluorescence imaging device was placed 8-12 cm from the patient and the clinical darkness required for imaging was obtained by a dark drape. The wound measured () on the () . The Choisr i:X fluorescence imaging device was used to report presence and location of cyan fluorescence, indicative of bacteria at loads greater than 104 CFU/g in real-time. Images reported to have cyan fluorescence. The wound was mechanically cleansed with Dakins 0.125% and underwent excisional debridement. Fluorescence images acquired after cleansing demonstrated reduction in bacteria.\par \par Patient to f/u in 1 week.\par

## 2023-06-30 DIAGNOSIS — I83.893 VARICOSE VEINS OF BILATERAL LOWER EXTREMITIES WITH OTHER COMPLICATIONS: ICD-10-CM

## 2023-06-30 DIAGNOSIS — L97.929 NON-PRESSURE CHRONIC ULCER OF UNSPECIFIED PART OF LEFT LOWER LEG WITH UNSPECIFIED SEVERITY: ICD-10-CM

## 2023-06-30 DIAGNOSIS — L97.922 NON-PRESSURE CHRONIC ULCER OF UNSPECIFIED PART OF LEFT LOWER LEG WITH FAT LAYER EXPOSED: ICD-10-CM

## 2023-06-30 DIAGNOSIS — I87.312 CHRONIC VENOUS HYPERTENSION (IDIOPATHIC) WITH ULCER OF LEFT LOWER EXTREMITY: ICD-10-CM

## 2023-07-05 ENCOUNTER — APPOINTMENT (OUTPATIENT)
Dept: WOUND CARE | Facility: CLINIC | Age: 88
End: 2023-07-05
Payer: COMMERCIAL

## 2023-07-05 ENCOUNTER — OUTPATIENT (OUTPATIENT)
Dept: OUTPATIENT SERVICES | Facility: HOSPITAL | Age: 88
LOS: 1 days | End: 2023-07-05
Payer: MEDICARE

## 2023-07-05 VITALS
HEIGHT: 62 IN | WEIGHT: 115 LBS | BODY MASS INDEX: 21.16 KG/M2 | SYSTOLIC BLOOD PRESSURE: 151 MMHG | HEART RATE: 68 BPM | DIASTOLIC BLOOD PRESSURE: 66 MMHG | TEMPERATURE: 98 F

## 2023-07-05 PROCEDURE — 11043 DBRDMT MUSC&/FSCA 1ST 20/<: CPT

## 2023-07-05 NOTE — PLAN
[FreeTextEntry1] : 06/07/2023\par skin sun applied, veil over it \par drawtex, multilayer dressing\par Follow up on vascular studies\par nursing orders given\par follow up in 2 weeks\par \par 07/05/202\par medihoney, drawtex, multilayer dressing\par vascular studies scheduled on 07/12\par nursing orders given\par follow up in 2 weeks

## 2023-07-05 NOTE — ASSESSMENT
[FreeTextEntry1] : Wound Assessment and Plan:\par \par The patient presents with a wound to the LLE.  Swelling noted to the extremity.  h/o compression stocking use  h/o varicose veins and BLLE knee replacement.\par ROLAND/PVR and standing venous reflux study scheduled.\par No clinical sign of infection\par Recommendation:\par \par Apply lidocaine or topical anesthetic if needed to reduce pain upon washing the wound.\par Wash wound with ----VASHE or Dove skin sensitive soap and clean water \par Apply ---medical grade honey/\par Apply ----multi-layer compression bandage/ compression stocking/ ACE bandage\par Change dressing ---daily\par Leg elevation as tolerated\par Encouraged ambulation or exercise.\par Optimization of nutrition.\par Offloading to the wound site.\par \par \par -----Wound supplies ordered via DME\par [place] [facility] [place] \par \par -----Homecare orders in place\par \par Follow up appointment scheduled for 1 week\par \par TeleHealth Services discussed with the patient and/or family.  Discharge instructions given including download of Andre information regarding:\par 1)  [facility] to obtain medical records\par 2)  [facility] to conduct Face-to-Face TeleHealth visit\par 3)  [employer] for the Patient (patient takes a picture of their wound which is sent to the patient's chart for review)\par \par \par 06/07/2023\par The patient follow  with a wound to the LLE.  Swelling noted to the extremity. h/o varicose veins and BLLE knee replacement.\par Tendon exposed in the wound, maceration noted to periwound\par S/p mechanical debridment\par /\par The patient was positioned to allow for optimization of imaging. The fluorescence imaging device was placed 8-12 cm from the patient and the clinical darkness required for imaging was obtained by a dark drape. The wound measured () on the () . The [employer] i:X fluorescence imaging device was used to report presence and location of cyan fluorescence, indicative of bacteria at loads greater than 104 CFU/g in real-time. Images reported to have cyan fluorescence. The wound was mechanically cleansed with Dakins 0.125% and underwent excisional debridement. Fluorescence images acquired after cleansing demonstrated reduction in bacteria.\par \par Vascular studies ordered\par  Plan for  application of (puraply). Wound has shown improvement through (increased granulation and/or decreased size).\par Wound is free from infection, drainage and necrotic tissue. (Patient has adequate blood supply as demonstrated by palpable pulses and an ROLAND of () from (date). \par Wound has been treated with standards of care for (3) weeks including (compression, offloading, debridement’s).\par \par 06/21/2023\par The patient follow  with a wound to the LLE.  Swelling noted to the extremity. h/o varicose veins and BLLE knee replacement.\par Tendon still  exposed in the wound, hypergranular at the edges, silvernitrated the hypergranular\par S/p excisional  debridement\par \par Wound is free from infection, drainage and necrotic tissue.  Patient has adequate blood supply as demonstrated by palpable pulses and an ROLAND of 1 .\par \par Wound has been treated with standards of care for over 4 weeks including compression, offloading, debridement’s).\par \par \par PuraPlyAM 15 Units  (1) applied today to patient’s (left leg). Wound bed debrided of bioburden and prepped for product application. (1 of pieces and original size of product) obtained. Total product usage was ( 15sq. cm), Total waste (0x sq. cm) due to wound size. Product secured with(sterile strips and bolster dressing.\par \par /\par The patient was positioned to allow for optimization of imaging. The fluorescence imaging device was placed 8-12 cm from the patient and the clinical darkness required for imaging was obtained by a dark drape. The wound measured () on the () . The [employer] Interactive i:X fluorescence imaging device was used to report presence and location of cyan fluorescence, indicative of bacteria at loads greater than 104 CFU/g in real-time. Images reported to have cyan fluorescence. The wound was mechanically cleansed with Dakins 0.125% and underwent excisional debridement. Fluorescence images acquired after cleansing demonstrated reduction in bacteria.\par \par Patient to f/u in 1 week.\par \par 07/05/2023\par The patient follow  with a wound to the LLE.  less Swelling noted to the extremity. h/o varicose veins and BLLE knee replacement.\par S/p excisional  debridement\par Info on [employer] and affinity study given to read\par c/w with medihoney, drawtex, multilayers dressing\par

## 2023-07-05 NOTE — PHYSICAL EXAM
[Normal Breath Sounds] : Normal breath sounds [2+] : left 2+ [Ankle Swelling (On Exam)] : present [Ankle Swelling Bilaterally] : bilaterally  [Skin Ulcer] : ulcer [Alert] : alert [Oriented to Person] : oriented to person [Oriented to Place] : oriented to place [Oriented to Time] : oriented to time [Calm] : calm [Please See PDF for [employer]] : Please See PDF for [employer]. [JVD] : no jugular venous distention  [Abdomen Tenderness] : ~T ~M No abdominal tenderness [de-identified] : NAD, ambulatory [de-identified] : AT [de-identified] : supple [de-identified] : soft

## 2023-07-12 ENCOUNTER — APPOINTMENT (OUTPATIENT)
Dept: VASCULAR SURGERY | Facility: CLINIC | Age: 88
End: 2023-07-12
Payer: MEDICARE

## 2023-07-12 VITALS
SYSTOLIC BLOOD PRESSURE: 168 MMHG | DIASTOLIC BLOOD PRESSURE: 61 MMHG | WEIGHT: 115 LBS | HEIGHT: 55 IN | HEART RATE: 80 BPM | BODY MASS INDEX: 26.61 KG/M2 | TEMPERATURE: 97.6 F

## 2023-07-12 PROCEDURE — 99204 OFFICE O/P NEW MOD 45 MIN: CPT

## 2023-07-12 PROCEDURE — 99214 OFFICE O/P EST MOD 30 MIN: CPT

## 2023-07-12 PROCEDURE — 93970 EXTREMITY STUDY: CPT

## 2023-07-19 ENCOUNTER — APPOINTMENT (OUTPATIENT)
Dept: WOUND CARE | Facility: CLINIC | Age: 88
End: 2023-07-19
Payer: COMMERCIAL

## 2023-07-19 ENCOUNTER — OUTPATIENT (OUTPATIENT)
Dept: OUTPATIENT SERVICES | Facility: HOSPITAL | Age: 88
LOS: 1 days | End: 2023-07-19
Payer: MEDICARE

## 2023-07-19 VITALS
TEMPERATURE: 97 F | OXYGEN SATURATION: 100 % | SYSTOLIC BLOOD PRESSURE: 155 MMHG | HEART RATE: 59 BPM | RESPIRATION RATE: 16 BRPM | DIASTOLIC BLOOD PRESSURE: 64 MMHG

## 2023-07-19 PROCEDURE — 0598T R-T FLUOR WOUND IMG 1ST SITE: CPT | Mod: LT

## 2023-07-19 PROCEDURE — 11042 DBRDMT SUBQ TIS 1ST 20SQCM/<: CPT

## 2023-07-19 NOTE — PHYSICAL EXAM
[2+] : left 2+ [Ankle Swelling (On Exam)] : present [Ankle Swelling On The Left] : of the left ankle [Ankle Swelling On The Right] : mild [de-identified] : NAD [FreeTextEntry1] : L leg anterior shin wound

## 2023-07-19 NOTE — ASSESSMENT
[FreeTextEntry1] : Ms. VASU CARBAJAL is a 89 year with persistent and worsening left venous insufficiency, CEAP classification C 6.  The patient’s symptoms interfere with their ADL’s, such as walking, shopping and house work. Patient has intact pulses in both legs without evidence of arterial insufficiency.  \par \par Treatment is indicated not for cosmetic reasons but for symptomatic venous reflux disease with symptoms which is refractory to conservative therapy. Venous duplex study demonstrates left  lower extremity venous insufficiency. The need for definitive effective treatment is based on severe, interfering and worsening reflux symptoms with evidence of venous insufficiency on venous ultrasound. \par \par Patient is a candidate for L GSV Venoseal.\par The risks and benefits of endovenous ablation treatment versus continued conservative management were discussed with the patient.  Patient chooses endovenous ablation treatments. Treatment plan to be scheduled. \par cont wound care\par \par d/w patient and daughter, Kendell Ceja.\par

## 2023-07-19 NOTE — HISTORY OF PRESENT ILLNESS
[FreeTextEntry1] : Ms. VASU CARBAJAL is a 89 year who presents for  evaluation of  left leg wound for 2m s/p trauma. \par Denies claudication or rest pain. New skin tear to the lateral leg from a dressing change. \par Has been seeing wound care. \par \par \par

## 2023-07-19 NOTE — PLAN
[FreeTextEntry1] : cavilon applied to skin tear\par wound healing well\par no clinical sign of infection

## 2023-07-20 DIAGNOSIS — I83.893 VARICOSE VEINS OF BILATERAL LOWER EXTREMITIES WITH OTHER COMPLICATIONS: ICD-10-CM

## 2023-07-20 DIAGNOSIS — L97.922 NON-PRESSURE CHRONIC ULCER OF UNSPECIFIED PART OF LEFT LOWER LEG WITH FAT LAYER EXPOSED: ICD-10-CM

## 2023-07-21 ENCOUNTER — NON-APPOINTMENT (OUTPATIENT)
Age: 88
End: 2023-07-21

## 2023-07-25 DIAGNOSIS — L97.922 NON-PRESSURE CHRONIC ULCER OF UNSPECIFIED PART OF LEFT LOWER LEG WITH FAT LAYER EXPOSED: ICD-10-CM

## 2023-07-25 DIAGNOSIS — I83.893 VARICOSE VEINS OF BILATERAL LOWER EXTREMITIES WITH OTHER COMPLICATIONS: ICD-10-CM

## 2023-07-25 DIAGNOSIS — I87.312 CHRONIC VENOUS HYPERTENSION (IDIOPATHIC) WITH ULCER OF LEFT LOWER EXTREMITY: ICD-10-CM

## 2023-07-25 DIAGNOSIS — L97.929 NON-PRESSURE CHRONIC ULCER OF UNSPECIFIED PART OF LEFT LOWER LEG WITH UNSPECIFIED SEVERITY: ICD-10-CM

## 2023-08-09 ENCOUNTER — APPOINTMENT (OUTPATIENT)
Dept: WOUND CARE | Facility: CLINIC | Age: 88
End: 2023-08-09
Payer: MEDICARE

## 2023-08-09 VITALS
TEMPERATURE: 97.2 F | OXYGEN SATURATION: 98 % | SYSTOLIC BLOOD PRESSURE: 154 MMHG | DIASTOLIC BLOOD PRESSURE: 58 MMHG | HEART RATE: 58 BPM | RESPIRATION RATE: 16 BRPM

## 2023-08-09 DIAGNOSIS — L97.922 NON-PRESSURE CHRONIC ULCER OF UNSPECIFIED PART OF LEFT LOWER LEG WITH FAT LAYER EXPOSED: ICD-10-CM

## 2023-08-09 PROCEDURE — 99213 OFFICE O/P EST LOW 20 MIN: CPT

## 2023-08-11 PROBLEM — L97.922 CHRONIC ULCER OF LEFT LOWER EXTREMITY WITH FAT LAYER EXPOSED: Status: ACTIVE | Noted: 2023-06-07

## 2023-08-11 NOTE — ASSESSMENT
[FreeTextEntry1] : Ms. VASU CARBAJAL is a 89 year with persistent and worsening left venous insufficiency, CEAP classification C 6.  The patient's symptoms interfere with their ADL's, such as walking, shopping and house work. Patient has intact pulses in both legs without evidence of arterial insufficiency.  \par  \par  Treatment is indicated not for cosmetic reasons but for symptomatic venous reflux disease with symptoms which is refractory to conservative therapy. Venous duplex study demonstrates left  lower extremity venous insufficiency. The need for definitive effective treatment is based on severe, interfering and worsening reflux symptoms with evidence of venous insufficiency on venous ultrasound. \par  \par  Patient is a candidate for L GSV Venoseal.\par  The risks and benefits of endovenous ablation treatment versus continued conservative management were discussed with the patient.  Patient chooses endovenous ablation treatments. Treatment plan to be scheduled. \par  cont wound care\par  \par  d/w patient and daughter, Kendell Ceja.\par

## 2023-08-11 NOTE — PLAN
[FreeTextEntry1] : cavilon applied to skin tear wound healing well no clinical sign of infection Continue compression therapy

## 2023-08-11 NOTE — PHYSICAL EXAM
[2+] : left 2+ [Ankle Swelling (On Exam)] : present [Ankle Swelling On The Left] : of the left ankle [Ankle Swelling On The Right] : mild [de-identified] : NAD [FreeTextEntry1] : L leg anterior shin wound

## 2023-08-14 NOTE — PHYSICAL EXAM
[2+] : left 2+ [Ankle Swelling (On Exam)] : present [Ankle Swelling On The Left] : of the left ankle [Ankle Swelling On The Right] : mild [de-identified] : NAD [FreeTextEntry1] : L leg anterior shin wound

## 2023-08-14 NOTE — HISTORY OF PRESENT ILLNESS
[FreeTextEntry1] : Ms. VASU CARBAJAL is a 89 year who presents for  evaluation of  left leg wound for 2m s/p trauma. \par Denies claudication or rest pain. \par Has been seeing wound care. \par \par \par

## 2023-08-14 NOTE — ASSESSMENT
[FreeTextEntry1] : Ms. VASU CARBAJAL is a 89 year with persistent and worsening left venous insufficiency, CEAP classification C 6.  The patient?s symptoms interfere with their ADL?s, such as walking, shopping and house work. Patient has intact pulses in both legs without evidence of arterial insufficiency.  \par \par Treatment is indicated not for cosmetic reasons but for symptomatic venous reflux disease with symptoms which is refractory to conservative therapy. Venous duplex study demonstrates left  lower extremity venous insufficiency. The need for definitive effective treatment is based on severe, interfering and worsening reflux symptoms with evidence of venous insufficiency on venous ultrasound. \par \par Patient is a candidate for L GSV Venoseal.\par The risks and benefits of endovenous ablation treatment versus continued conservative management were discussed with the patient.  Patient chooses endovenous ablation treatments. Treatment plan to be scheduled. \par cont wound care\par \par

## 2023-08-24 RX ORDER — ALPRAZOLAM 0.25 MG/1
0.25 TABLET ORAL
Qty: 1 | Refills: 0 | Status: COMPLETED | COMMUNITY
Start: 2023-08-24 | End: 1900-01-01

## 2023-08-28 ENCOUNTER — APPOINTMENT (OUTPATIENT)
Dept: VASCULAR SURGERY | Facility: CLINIC | Age: 88
End: 2023-08-28
Payer: MEDICARE

## 2023-08-28 PROCEDURE — 36482Z ENDOVEN THER CHEM ADHES 1ST: CUSTOM | Mod: LT

## 2023-08-28 NOTE — PROCEDURE
[FreeTextEntry1] : left GSV Venaseal [FreeTextEntry3] : Procedural safety checklist and time out completed: Confirmed patient identification (Patient Name, , and/or medical record number including when possible affirmation by patient or parent/family/other). Confirmed procedure with the patient. Consent present, accurate and signed.  Confirmed special equipment and supplies are present. Sterility confirmed. Position verified.  Site/ side is marked and visible and confirmed.  Procedure confirmed by consent. Accurate consent including side and site. Review of medical records, including venous ultrasound, noting correct procedure including site and side. MD/PA verifies presence and review of imaging studies and or written report of imaging studies. Agreement on the procedure to be performed Time out completed. All of the above has been confirmed by the team. All patient-specific concerns have been addressed.   Indication: left lower extremity varicose veins with ulcer, inflammation, leg pain, leg swelling, and leg cramping.  Venous insufficiency/ reflux.  Procedure:  Endovenous ablation of the left great saphenous vein with VenaSeal Closure System 	 Ms. VASU CARBAJAL is a 89 year old F with a history of left lower extremity varicose veins and venous insufficiency previously seen in the office.  Ultrasound examination demonstrated venous insufficiency. A trial of compression stockings, exercise, elevation, and pain medication was attempted without relief and definitive treatment with endovenous ablation was offered.   I have discussed the risks of the procedure at length with the patient. The risks discussed were inclusive of but not limited to infection, irritation at the site of infiltration of local anesthesia, and also rare risk of deep venous thrombosis and pulmonary emboli. The patient agrees to proceed with the procedure.   The patient was escorted into the procedure room and a time out called.  The entire limb was prepped and draped in sterile fashion. Ultrasound guidance was used to localize the access site. 1% lidocaine was injected as a local anesthetic in the subcutaneous tissues at the target location in the leg. Using ultrasound guidance, access was gained at this location with the 19 gauge thin walled access needle and followed by introduction of a short guidewire, location confirmed with ultrasound. A small, 3 mm incision was made at the access site to allow for introduction and placement of the 7 Fr x7cm introducer/dilator. The dilator and guidewire were removed. The 0.035 guidewire from the Venaseal kit was then introduced and positioned at the saphenofemoral junction using ultrasound guidance. The 80 cm 7 Fr introducer sheath/dilator was positioned 5cm from the saphenofemoral junction. The guidewire and dilator were removed, and the remaining sheath was flushed with sterile saline, with the syringe remaining in place prior to the next steps.   The cyanoacrylate adhesive was precisely primed into the 5 F delivery catheter and this catheter/syringe combination was attached within the dispenser gun. This assembly was introduced through the 7 F sheath and positioned 5 cm caudal of the saphenofemoral junction under ultrasound guidance. The steps from the IFU were followed for dispensing amounts, locations and compression times, e.g 2 aliquots proximally with 3 minutes of compression, and 1 aliquot every 3cm distally with 30 sec of compression along the course of the vessel Following the last injection and compression sequence, the catheter and introducer sheath were pulled out from the access site. Hemostasis was achieved with manual compression and an adhesive bandage was applied to the incision. Ultrasound confirmed complete coaptation and closure of the treated segments of the GSV, and the absence of any DVT at the saphenofemoral junction.   Treatment length of the vein _50_cm.  The drapes were removed and the patient cleaned and prepared for discharge. Patient tolerated procedure well. Patient was given post-procedure instructions and follow up appointment was scheduled.  Post op ultrasound  is scheduled.

## 2023-09-01 ENCOUNTER — APPOINTMENT (OUTPATIENT)
Dept: WOUND CARE | Facility: CLINIC | Age: 88
End: 2023-09-01
Payer: COMMERCIAL

## 2023-09-01 ENCOUNTER — APPOINTMENT (OUTPATIENT)
Dept: VASCULAR SURGERY | Facility: CLINIC | Age: 88
End: 2023-09-01
Payer: MEDICARE

## 2023-09-01 ENCOUNTER — OUTPATIENT (OUTPATIENT)
Dept: OUTPATIENT SERVICES | Facility: HOSPITAL | Age: 88
LOS: 1 days | End: 2023-09-01
Payer: MEDICARE

## 2023-09-01 VITALS
TEMPERATURE: 98.2 F | HEART RATE: 89 BPM | BODY MASS INDEX: 22.58 KG/M2 | DIASTOLIC BLOOD PRESSURE: 67 MMHG | WEIGHT: 115 LBS | HEIGHT: 60 IN | SYSTOLIC BLOOD PRESSURE: 144 MMHG

## 2023-09-01 VITALS — TEMPERATURE: 97.2 F

## 2023-09-01 DIAGNOSIS — I87.2 VENOUS INSUFFICIENCY (CHRONIC) (PERIPHERAL): ICD-10-CM

## 2023-09-01 DIAGNOSIS — I83.893 VARICOSE VEINS OF BILATERAL LOWER EXTREMITIES WITH OTHER COMPLICATIONS: ICD-10-CM

## 2023-09-01 DIAGNOSIS — I87.312 CHRONIC VENOUS HYPERTENSION (IDIOPATHIC) WITH ULCER OF LEFT LOWER EXTREMITY: ICD-10-CM

## 2023-09-01 DIAGNOSIS — S81.802D UNSPECIFIED OPEN WOUND, LEFT LOWER LEG, SUBSEQUENT ENCOUNTER: ICD-10-CM

## 2023-09-01 DIAGNOSIS — L97.929 CHRONIC VENOUS HYPERTENSION (IDIOPATHIC) WITH ULCER OF LEFT LOWER EXTREMITY: ICD-10-CM

## 2023-09-01 PROCEDURE — G0463: CPT

## 2023-09-01 PROCEDURE — 99214 OFFICE O/P EST MOD 30 MIN: CPT

## 2023-09-01 PROCEDURE — 99213 OFFICE O/P EST LOW 20 MIN: CPT

## 2023-09-01 PROCEDURE — 93971 EXTREMITY STUDY: CPT | Mod: LT

## 2023-09-01 NOTE — VITALS
[Tender] : tender [FreeTextEntry3] : left medial thigh [FreeTextEntry1] : walking short distance [FreeTextEntry2] : standing and walking

## 2023-09-01 NOTE — PHYSICAL EXAM
[1+] : left 1+ [Varicose Veins Of Lower Extremities] : bilaterally [Skin Ulcer] : ulcer [Alert] : alert [Oriented to Person] : oriented to person [Oriented to Place] : oriented to place [Oriented to Time] : oriented to time [Calm] : calm [Please See PDF for Tissue Analytics] : Please See PDF for [employer] Analytics. [de-identified] : NAD [de-identified] : AT [de-identified] : supple [de-identified] : equal chest rise [de-identified] : FROM

## 2023-09-01 NOTE — ASSESSMENT
[FreeTextEntry1] : Ms. VASU CARBAJAL is a 89 year with persistent and worsening left venous insufficiency, CEAP classification C 6.  The patient's symptoms interfere with their ADL's, such as walking, shopping and house work. Patient has intact pulses in both legs without evidence of arterial insufficiency.    Treatment is indicated not for cosmetic reasons but for symptomatic venous reflux disease with symptoms which is refractory to conservative therapy. Venous duplex study demonstrates left  lower extremity venous insufficiency. The need for definitive effective treatment is based on severe, interfering and worsening reflux symptoms with evidence of venous insufficiency on venous ultrasound.   Patient is a candidate for L GSV Venoseal. The risks and benefits of endovenous ablation treatment versus continued conservative management were discussed with the patient.  Patient chooses endovenous ablation treatments. Treatment plan to be scheduled.  cont wound care  d/w patient and daughter, Kendell Ceja.  9/1/23 Patient presents for follow up of left lower extremity venous insufficiency and compression wrapping after ultrasound.. Patient had a venous ablation on Monday 8/28/23. Patient reports her left thigh is swollen and indurated. Patient seen today for follow up US in the vascular office. RN in vascular suggested Ibuprofen and a warm compress. On exam Lower extremity with scattered areas of serous fluid weeping, areas of dry flaky skin present. Anterior surface with an ecchymotic area s/p fall. Left thigh swollen and indurated and warm at the medial aspect. Patient has complaint of 10/10 pain. Patient took Tylenol with little relief. s/p mechanical debridement of lLLE RLE:  with scattered dry flaking areas.  No s/s of infection.

## 2023-09-01 NOTE — PHYSICAL EXAM
[1+] : left 1+ [2+] : left 2+ [Ankle Swelling (On Exam)] : present [Varicose Veins Of Lower Extremities] : bilaterally [] : present [Ankle Swelling On The Left] : moderate [Alert] : alert [Oriented to Person] : oriented to person [Oriented to Place] : oriented to place [Oriented to Time] : oriented to time [Calm] : calm [de-identified] : NAD [de-identified] : NCAT [de-identified] : unlabored breathing [FreeTextEntry1] : left medial thigh warm and red LLE ulcerations [de-identified] : NADIRL [de-identified] : ilia cranial nerves 2-12 ilia grossly intact [de-identified] : cooperative

## 2023-09-01 NOTE — PLAN
[FreeTextEntry1] : cavilon applied to skin tear wound healing well no clinical sign of infection Continue compression therapy  9/1/23 Plan: Moisturize bilateral lower extremities. LLE:  Aquacel  over superficial wounds above the lateral malleolus and wrapped with Kerlix. RLE: Alva and ACE Spoke to Vascular office regarding PE findings of Left Thigh.  Patient being sent back to Vascular office today for examination of the Left thigh after this visit is completed. Homecare ordered to restart service. Nursing orders given Referral to Dermatology given to patient. RTO in three weeks.

## 2023-09-01 NOTE — REASON FOR VISIT
[Follow-Up: _____] : a [unfilled] follow-up visit [Family Member] : family member [FreeTextEntry1] : LILLIAN

## 2023-09-01 NOTE — REVIEW OF SYSTEMS
[As Noted in HPI] : as noted in HPI [Skin Lesions] : skin lesion [Skin Wound] : skin wound [Negative] : Constitutional [FreeTextEntry9] : left thigh pain

## 2023-09-01 NOTE — HISTORY OF PRESENT ILLNESS
[FreeTextEntry1] : Pt is here to evaluate LLE. Accompanied by granddaughter. She is s/p left gsv venaseal on 8/28/2023. Pt's left medial thigh is red, warm and painful since Tuesday. Redness and pain have not improved. She takes Tylenol with minimal relief. Denies trauma or injury to LLE. Worse with standing and walking immediately. Better with walking after couple of minutes.

## 2023-09-01 NOTE — ASSESSMENT
[FreeTextEntry1] : Impression - venous insufficency s/p left gsv venaseal now with left medial thigh redness and pain  Plan Conservative medical management - leg elevation, exercise regimen, and knee high 20-30 mm hg compression stockings  Advised to take NSAIDS and apply warm compress for leg discomfort Advised to take benadryl 25 mg today only Advised to take prednisone 5 mg and famotidine 20 mg x 5 days  Return to office next Wednesday to re-evaluate LLE [Arterial/Venous Disease] : arterial/venous disease [Medication Management] : medication management

## 2023-09-03 ENCOUNTER — INPATIENT (INPATIENT)
Facility: HOSPITAL | Age: 88
LOS: 5 days | Discharge: HOME | DRG: 867 | End: 2023-09-09
Attending: HOSPITALIST | Admitting: HOSPITALIST
Payer: MEDICARE

## 2023-09-03 VITALS
DIASTOLIC BLOOD PRESSURE: 48 MMHG | RESPIRATION RATE: 16 BRPM | TEMPERATURE: 102 F | HEIGHT: 62 IN | OXYGEN SATURATION: 94 % | HEART RATE: 90 BPM | SYSTOLIC BLOOD PRESSURE: 110 MMHG | WEIGHT: 134.92 LBS

## 2023-09-03 DIAGNOSIS — I10 ESSENTIAL (PRIMARY) HYPERTENSION: ICD-10-CM

## 2023-09-03 DIAGNOSIS — Z95.2 PRESENCE OF PROSTHETIC HEART VALVE: Chronic | ICD-10-CM

## 2023-09-03 DIAGNOSIS — Z90.49 ACQUIRED ABSENCE OF OTHER SPECIFIED PARTS OF DIGESTIVE TRACT: Chronic | ICD-10-CM

## 2023-09-03 DIAGNOSIS — E03.9 HYPOTHYROIDISM, UNSPECIFIED: ICD-10-CM

## 2023-09-03 DIAGNOSIS — R74.8 ABNORMAL LEVELS OF OTHER SERUM ENZYMES: ICD-10-CM

## 2023-09-03 DIAGNOSIS — L03.90 CELLULITIS, UNSPECIFIED: ICD-10-CM

## 2023-09-03 DIAGNOSIS — Z96.659 PRESENCE OF UNSPECIFIED ARTIFICIAL KNEE JOINT: Chronic | ICD-10-CM

## 2023-09-03 DIAGNOSIS — Z98.49 CATARACT EXTRACTION STATUS, UNSPECIFIED EYE: Chronic | ICD-10-CM

## 2023-09-03 DIAGNOSIS — Z29.9 ENCOUNTER FOR PROPHYLACTIC MEASURES, UNSPECIFIED: ICD-10-CM

## 2023-09-03 DIAGNOSIS — A41.9 SEPSIS, UNSPECIFIED ORGANISM: ICD-10-CM

## 2023-09-03 DIAGNOSIS — D64.9 ANEMIA, UNSPECIFIED: ICD-10-CM

## 2023-09-03 DIAGNOSIS — E87.1 HYPO-OSMOLALITY AND HYPONATREMIA: ICD-10-CM

## 2023-09-03 DIAGNOSIS — I82.409 ACUTE EMBOLISM AND THROMBOSIS OF UNSPECIFIED DEEP VEINS OF UNSPECIFIED LOWER EXTREMITY: ICD-10-CM

## 2023-09-03 DIAGNOSIS — N17.9 ACUTE KIDNEY FAILURE, UNSPECIFIED: ICD-10-CM

## 2023-09-03 DIAGNOSIS — Z95.2 PRESENCE OF PROSTHETIC HEART VALVE: ICD-10-CM

## 2023-09-03 LAB
ALBUMIN SERPL ELPH-MCNC: 2.3 G/DL — LOW (ref 3.3–5)
ALP SERPL-CCNC: 275 U/L — HIGH (ref 40–120)
ALT FLD-CCNC: 147 U/L — HIGH (ref 12–78)
ANION GAP SERPL CALC-SCNC: 8 MMOL/L — SIGNIFICANT CHANGE UP (ref 5–17)
APTT BLD: 23.4 SEC — LOW (ref 24.5–35.6)
AST SERPL-CCNC: 90 U/L — HIGH (ref 15–37)
BASOPHILS # BLD AUTO: 0 K/UL — SIGNIFICANT CHANGE UP (ref 0–0.2)
BASOPHILS NFR BLD AUTO: 0 % — SIGNIFICANT CHANGE UP (ref 0–2)
BILIRUB SERPL-MCNC: 0.4 MG/DL — SIGNIFICANT CHANGE UP (ref 0.2–1.2)
BUN SERPL-MCNC: 46 MG/DL — HIGH (ref 7–23)
CALCIUM SERPL-MCNC: 7.2 MG/DL — LOW (ref 8.5–10.1)
CHLORIDE SERPL-SCNC: 101 MMOL/L — SIGNIFICANT CHANGE UP (ref 96–108)
CO2 SERPL-SCNC: 18 MMOL/L — LOW (ref 22–31)
CREAT SERPL-MCNC: 1.6 MG/DL — HIGH (ref 0.5–1.3)
EGFR: 30 ML/MIN/1.73M2 — LOW
EOSINOPHIL # BLD AUTO: 0 K/UL — SIGNIFICANT CHANGE UP (ref 0–0.5)
EOSINOPHIL NFR BLD AUTO: 0 % — SIGNIFICANT CHANGE UP (ref 0–6)
FLUAV AG NPH QL: SIGNIFICANT CHANGE UP
FLUBV AG NPH QL: SIGNIFICANT CHANGE UP
GLUCOSE SERPL-MCNC: 187 MG/DL — HIGH (ref 70–99)
HCT VFR BLD CALC: 24.5 % — LOW (ref 34.5–45)
HGB BLD-MCNC: 8.1 G/DL — LOW (ref 11.5–15.5)
INR BLD: 0.96 RATIO — SIGNIFICANT CHANGE UP (ref 0.85–1.18)
LACTATE SERPL-SCNC: 1.9 MMOL/L — SIGNIFICANT CHANGE UP (ref 0.7–2)
LYMPHOCYTES # BLD AUTO: 0.35 K/UL — LOW (ref 1–3.3)
LYMPHOCYTES # BLD AUTO: 3 % — LOW (ref 13–44)
MCHC RBC-ENTMCNC: 30.9 PG — SIGNIFICANT CHANGE UP (ref 27–34)
MCHC RBC-ENTMCNC: 33.1 GM/DL — SIGNIFICANT CHANGE UP (ref 32–36)
MCV RBC AUTO: 93.5 FL — SIGNIFICANT CHANGE UP (ref 80–100)
MONOCYTES # BLD AUTO: 0.35 K/UL — SIGNIFICANT CHANGE UP (ref 0–0.9)
MONOCYTES NFR BLD AUTO: 3 % — SIGNIFICANT CHANGE UP (ref 2–14)
NEUTROPHILS # BLD AUTO: 10.97 K/UL — HIGH (ref 1.8–7.4)
NEUTROPHILS NFR BLD AUTO: 91 % — HIGH (ref 43–77)
NRBC # BLD: SIGNIFICANT CHANGE UP /100 WBCS (ref 0–0)
PLATELET # BLD AUTO: 177 K/UL — SIGNIFICANT CHANGE UP (ref 150–400)
POTASSIUM SERPL-MCNC: 4.2 MMOL/L — SIGNIFICANT CHANGE UP (ref 3.5–5.3)
POTASSIUM SERPL-SCNC: 4.2 MMOL/L — SIGNIFICANT CHANGE UP (ref 3.5–5.3)
PROT SERPL-MCNC: 5.5 G/DL — LOW (ref 6–8.3)
PROTHROM AB SERPL-ACNC: 11.2 SEC — SIGNIFICANT CHANGE UP (ref 9.5–13)
RBC # BLD: 2.62 M/UL — LOW (ref 3.8–5.2)
RBC # FLD: 14.8 % — HIGH (ref 10.3–14.5)
RSV RNA NPH QL NAA+NON-PROBE: SIGNIFICANT CHANGE UP
SARS-COV-2 RNA SPEC QL NAA+PROBE: SIGNIFICANT CHANGE UP
SODIUM SERPL-SCNC: 127 MMOL/L — LOW (ref 135–145)
WBC # BLD: 11.67 K/UL — HIGH (ref 3.8–10.5)
WBC # FLD AUTO: 11.67 K/UL — HIGH (ref 3.8–10.5)

## 2023-09-03 PROCEDURE — 76882 US LMTD JT/FCL EVL NVASC XTR: CPT | Mod: 26,59,LT

## 2023-09-03 PROCEDURE — 99285 EMERGENCY DEPT VISIT HI MDM: CPT

## 2023-09-03 PROCEDURE — 99223 1ST HOSP IP/OBS HIGH 75: CPT | Mod: GC

## 2023-09-03 PROCEDURE — 93971 EXTREMITY STUDY: CPT | Mod: 26,LT

## 2023-09-03 PROCEDURE — 71045 X-RAY EXAM CHEST 1 VIEW: CPT | Mod: 26

## 2023-09-03 RX ORDER — HEPARIN SODIUM 5000 [USP'U]/ML
5000 INJECTION INTRAVENOUS; SUBCUTANEOUS ONCE
Refills: 0 | Status: COMPLETED | OUTPATIENT
Start: 2023-09-03 | End: 2023-09-03

## 2023-09-03 RX ORDER — ASPIRIN/CALCIUM CARB/MAGNESIUM 324 MG
81 TABLET ORAL DAILY
Refills: 0 | Status: DISCONTINUED | OUTPATIENT
Start: 2023-09-03 | End: 2023-09-04

## 2023-09-03 RX ORDER — HEPARIN SODIUM 5000 [USP'U]/ML
2500 INJECTION INTRAVENOUS; SUBCUTANEOUS EVERY 6 HOURS
Refills: 0 | Status: DISCONTINUED | OUTPATIENT
Start: 2023-09-03 | End: 2023-09-04

## 2023-09-03 RX ORDER — ACETAMINOPHEN 500 MG
650 TABLET ORAL EVERY 6 HOURS
Refills: 0 | Status: DISCONTINUED | OUTPATIENT
Start: 2023-09-03 | End: 2023-09-09

## 2023-09-03 RX ORDER — LEVOTHYROXINE SODIUM 125 MCG
100 TABLET ORAL DAILY
Refills: 0 | Status: DISCONTINUED | OUTPATIENT
Start: 2023-09-03 | End: 2023-09-09

## 2023-09-03 RX ORDER — ACETAMINOPHEN 500 MG
1000 TABLET ORAL ONCE
Refills: 0 | Status: COMPLETED | OUTPATIENT
Start: 2023-09-03 | End: 2023-09-03

## 2023-09-03 RX ORDER — HEPARIN SODIUM 5000 [USP'U]/ML
INJECTION INTRAVENOUS; SUBCUTANEOUS
Qty: 25000 | Refills: 0 | Status: DISCONTINUED | OUTPATIENT
Start: 2023-09-03 | End: 2023-09-04

## 2023-09-03 RX ORDER — SODIUM CHLORIDE 9 MG/ML
1000 INJECTION INTRAMUSCULAR; INTRAVENOUS; SUBCUTANEOUS
Refills: 0 | Status: DISCONTINUED | OUTPATIENT
Start: 2023-09-03 | End: 2023-09-04

## 2023-09-03 RX ORDER — VANCOMYCIN HCL 1 G
1000 VIAL (EA) INTRAVENOUS ONCE
Refills: 0 | Status: COMPLETED | OUTPATIENT
Start: 2023-09-03 | End: 2023-09-03

## 2023-09-03 RX ORDER — SODIUM CHLORIDE 9 MG/ML
1550 INJECTION INTRAMUSCULAR; INTRAVENOUS; SUBCUTANEOUS ONCE
Refills: 0 | Status: COMPLETED | OUTPATIENT
Start: 2023-09-03 | End: 2023-09-03

## 2023-09-03 RX ORDER — ONDANSETRON 8 MG/1
4 TABLET, FILM COATED ORAL EVERY 8 HOURS
Refills: 0 | Status: DISCONTINUED | OUTPATIENT
Start: 2023-09-03 | End: 2023-09-09

## 2023-09-03 RX ORDER — HEPARIN SODIUM 5000 [USP'U]/ML
5000 INJECTION INTRAVENOUS; SUBCUTANEOUS EVERY 6 HOURS
Refills: 0 | Status: DISCONTINUED | OUTPATIENT
Start: 2023-09-03 | End: 2023-09-04

## 2023-09-03 RX ORDER — PIPERACILLIN AND TAZOBACTAM 4; .5 G/20ML; G/20ML
3.38 INJECTION, POWDER, LYOPHILIZED, FOR SOLUTION INTRAVENOUS ONCE
Refills: 0 | Status: COMPLETED | OUTPATIENT
Start: 2023-09-03 | End: 2023-09-03

## 2023-09-03 RX ORDER — PIPERACILLIN AND TAZOBACTAM 4; .5 G/20ML; G/20ML
3.38 INJECTION, POWDER, LYOPHILIZED, FOR SOLUTION INTRAVENOUS EVERY 8 HOURS
Refills: 0 | Status: DISCONTINUED | OUTPATIENT
Start: 2023-09-03 | End: 2023-09-04

## 2023-09-03 RX ORDER — INFLUENZA VIRUS VACCINE 15; 15; 15; 15 UG/.5ML; UG/.5ML; UG/.5ML; UG/.5ML
0.7 SUSPENSION INTRAMUSCULAR ONCE
Refills: 0 | Status: DISCONTINUED | OUTPATIENT
Start: 2023-09-03 | End: 2023-09-09

## 2023-09-03 RX ORDER — LANOLIN ALCOHOL/MO/W.PET/CERES
3 CREAM (GRAM) TOPICAL AT BEDTIME
Refills: 0 | Status: DISCONTINUED | OUTPATIENT
Start: 2023-09-03 | End: 2023-09-09

## 2023-09-03 RX ADMIN — PIPERACILLIN AND TAZOBACTAM 25 GRAM(S): 4; .5 INJECTION, POWDER, LYOPHILIZED, FOR SOLUTION INTRAVENOUS at 20:54

## 2023-09-03 RX ADMIN — SODIUM CHLORIDE 1550 MILLILITER(S): 9 INJECTION INTRAMUSCULAR; INTRAVENOUS; SUBCUTANEOUS at 15:13

## 2023-09-03 RX ADMIN — Medication 250 MILLIGRAM(S): at 17:07

## 2023-09-03 RX ADMIN — Medication 1000 MILLIGRAM(S): at 18:07

## 2023-09-03 RX ADMIN — Medication 1000 MILLIGRAM(S): at 15:43

## 2023-09-03 RX ADMIN — PIPERACILLIN AND TAZOBACTAM 200 GRAM(S): 4; .5 INJECTION, POWDER, LYOPHILIZED, FOR SOLUTION INTRAVENOUS at 15:56

## 2023-09-03 RX ADMIN — Medication 400 MILLIGRAM(S): at 15:13

## 2023-09-03 RX ADMIN — SODIUM CHLORIDE 100 MILLILITER(S): 9 INJECTION INTRAMUSCULAR; INTRAVENOUS; SUBCUTANEOUS at 20:54

## 2023-09-03 RX ADMIN — HEPARIN SODIUM 1100 UNIT(S)/HR: 5000 INJECTION INTRAVENOUS; SUBCUTANEOUS at 23:08

## 2023-09-03 RX ADMIN — Medication 650 MILLIGRAM(S): at 21:02

## 2023-09-03 RX ADMIN — PIPERACILLIN AND TAZOBACTAM 3.38 GRAM(S): 4; .5 INJECTION, POWDER, LYOPHILIZED, FOR SOLUTION INTRAVENOUS at 16:26

## 2023-09-03 RX ADMIN — HEPARIN SODIUM 1100 UNIT(S)/HR: 5000 INJECTION INTRAVENOUS; SUBCUTANEOUS at 18:49

## 2023-09-03 RX ADMIN — SODIUM CHLORIDE 1550 MILLILITER(S): 9 INJECTION INTRAMUSCULAR; INTRAVENOUS; SUBCUTANEOUS at 16:13

## 2023-09-03 RX ADMIN — Medication 1000 MILLIGRAM(S): at 15:28

## 2023-09-03 RX ADMIN — HEPARIN SODIUM 5000 UNIT(S): 5000 INJECTION INTRAVENOUS; SUBCUTANEOUS at 18:49

## 2023-09-03 NOTE — ED PROVIDER NOTE - MUSCULOSKELETAL, MLM
left thigh medial aspect with redness warmth induration small puncture wound noted healing chronic would lower extremity with dry skin nvi

## 2023-09-03 NOTE — H&P ADULT - HISTORY OF PRESENT ILLNESS
*********CHARTING IN PROGRESS***********      ED course:  vitals - bp 94/52, rr 17, hr 70, t 97.7, spo2 99  labs - wbc 11.67, hgb 8.1, na 127, bun/cr 46/1.6, gluc 187, ca 7.2, alb 2.3, alk phos 275, ast 90, alt 147  imaging - US duplex venous lower ext ltd - Acute deep venous thrombosis: below the knee.  EKG -  meds - tylenol x1, ns bolus, vanc x1, zosyn x1, started on heparin drip 89 y/o f pmhx varicose veins, HTN, hypothyroid, colon cancer s/p resection 2002, osteoporosis, s/p tavr, presents w/ pain, erythema LLE. On Monday, patient had an unspecified LLE vein procedure. On friday, patient followed up with wound care who did not like the look of the surrounding area, sent back to vascular. Vascular performed a lle dvt study which didn't demonstrate any clots. Vascular believed this may be a normal inflammatory reaction, sent patient home on pepcid, prednisone and benadryl to be taken at night. She then woke up on saturday with fever, chills. Complaining of nausea, headache, and pain of the LLE. She has also had decreased appetite. Patient states she feels "lousy" and has no other specific complaints. Denies chest pain, shortness of breath, abd pain, vomiting, diarrhea.    ED course:  vitals - bp 94/52, rr 17, hr 70, t 97.7, spo2 99  labs - wbc 11.67, hgb 8.1, na 127, bun/cr 46/1.6, gluc 187, ca 7.2, alb 2.3, alk phos 275, ast 90, alt 147  imaging - US duplex venous lower ext ltd - Acute deep venous thrombosis: below the knee.  EKG - pending  meds - tylenol x1, ns bolus, vanc x1, zosyn x1, started on heparin drip 89 y/o f pmhx varicose veins, HTN, hypothyroid, colon cancer s/p resection 2002, osteoporosis, s/p tavr, presents w/ pain, erythema LLE. On Monday, patient had varithena procedure for varicose veins. On friday, patient followed up with wound care who did not like the look of the surrounding area, sent back to vascular. Vascular performed a lle dvt study which didn't demonstrate any clots. Vascular believed this may be a normal inflammatory reaction vs allergic rxn (?) to varithena injectable, sent patient home on pepcid, prednisone and benadryl to be taken at night. She then woke up on saturday with fever, chills. Complaining of nausea, headache, and pain of the LLE. She has also had decreased appetite. Patient states she feels "lousy" and has no other specific complaints. Denies chest pain, shortness of breath, abd pain, vomiting, diarrhea.    ED course:  vitals - bp 94/52, rr 17, hr 70, t 97.7, spo2 99  labs - wbc 11.67, hgb 8.1, na 127, bun/cr 46/1.6, gluc 187, ca 7.2, alb 2.3, alk phos 275, ast 90, alt 147  imaging - US duplex venous lower ext ltd - Acute deep venous thrombosis: below the knee.  EKG - pending  meds - tylenol x1, ns bolus, vanc x1, zosyn x1, started on heparin drip 89 y/o f pmhx varicose veins, HTN, hypothyroid, colon cancer s/p resection 2002, osteoporosis, s/p tavr, presents w/ pain, erythema LLE. On Monday, patient had Varithena procedure for varicose veins. On friday, patient followed up with wound care who did not like the look of the surrounding area, sent back to vascular. Vascular performed a lle dvt study which didn't demonstrate any clots. Vascular believed this may be a normal inflammatory reaction vs allergic rxn (?) to varithena injectable, sent patient home on pepcid, prednisone and benadryl to be taken at night. She then woke up on saturday with fever, chills. Complaining of nausea, headache, and pain of the LLE. She has also had decreased appetite. Patient states she feels "lousy" and has no other specific complaints. Denies chest pain, shortness of breath, abd pain, vomiting, diarrhea.    ED course:  vitals - bp 94/52, rr 17, hr 70, t 97.7, spo2 99  labs - wbc 11.67, hgb 8.1, na 127, bun/cr 46/1.6, gluc 187, ca 7.2, alb 2.3, alk phos 275, ast 90, alt 147  imaging - US duplex venous lower ext ltd - Acute deep venous thrombosis: below the knee.  EKG - pending  meds - tylenol x1, ns bolus, vanc x1, zosyn x1, started on heparin drip

## 2023-09-03 NOTE — H&P ADULT - PROBLEM SELECTOR PLAN 6
presents with alkphos 275, AST 90, , unclear etiology. No abd pain, distention  - will check RUQ US, f/u  - trend enzymes H 8.1 on admission, baseline hemoglobin unknown  - Currently hemodynamically stable, monitor for signs and symptoms of active bleeding  - Consider transfusion for hemoglobin <7  - type and screen in am  - F/u iron panel: iron, ferritin, TIBC, transferrin  - F/u folate, vitamin B12, TSH  - F/u AM CBC

## 2023-09-03 NOTE — ED PROVIDER NOTE - CLINICAL SUMMARY MEDICAL DECISION MAKING FREE TEXT BOX
I, Nawaf Barrios MD, have seen and examined the patient on the date of service.  I agree with the DEONNA's assessment as written, with exceptions or additions as noted below or in a separate note. pt with pmh htn, hypothyroidism, varicose veins is here for generalized weakness and concern for dehydration. had recent procedure on LLE last week. having redness and swelling to upper thigh. denies cp/sob/n/v. on exam has intact pedal pulse. left upper thigh with erythema and warmth. leg slightly tender. a/p: concern for cellulitis vs dvt. she is NVI. given fever, concern for sepsis. will start abx, anticipate admission.

## 2023-09-03 NOTE — ED PROVIDER NOTE - OBJECTIVE STATEMENT
Patient is a 89-year-old female with past medical history of hypertension hypothyroidism osteoporosis varicose veins coming in for fever and generalized weakness today.  Family states patient had wound on her left lower leg which was not healing and had a vein procedure done last week as an outpatient.  Patient noted to have swelling redness and pain in her left thigh.  No chest pain no shortness of breath no diarrhea no nausea no vomiting no urinary symptoms.

## 2023-09-03 NOTE — ED ADULT NURSE NOTE - IN THE PAST 12 MONTHS HAVE YOU USED DRUGS OTHER THAN THOSE REQUIRED FOR MEDICAL REASON?
Anesthesia Evaluation     . Pt has had prior anesthetic. Type: General and MAC    No history of anesthetic complications          ROS/MED HX    ENT/Pulmonary:     (+)tobacco use, Past use , . .    Neurologic: Comment: scoliosis      Cardiovascular: Comment: Leg edema    (+) hypertension----. : . . . :. . Previous cardiac testing date:results:date: results:ECG reviewed date:10/2017 results:SR 1st degree AV block date: results:          METS/Exercise Tolerance:  >4 METS   Hematologic:     (+) Anemia, -      Musculoskeletal:   (+) arthritis,  other musculoskeletal- Pseudogout, left shoulder pain and bilat. leg numbness, s/p back fusion       GI/Hepatic: Comment: hemorrhoids    (+) GERD       Renal/Genitourinary: Comment: LUTS    (+) Nephrolithiasis , BPH,       Endo:     (+) Obesity, .      Psychiatric:  - neg psychiatric ROS       Infectious Disease:  - neg infectious disease ROS       Malignancy:      - no malignancy   Other: Comment: Thalassemia minor  hypopotassemia   (+) H/O Chronic Pain,H/O chronic opiod use ,                    Physical Exam  Normal systems: cardiovascular, pulmonary and dental    Airway   Mallampati: II  TM distance: >3 FB  Neck ROM: full    Dental   Comment: Poor dentition    Cardiovascular       Pulmonary                         Anesthesia Plan      History & Physical Review  History and physical reviewed and following examination; no interval change.    ASA Status:  3 .    NPO Status:  > 8 hours    Plan for General with Intravenous and Propofol induction.   PONV prophylaxis:  Ondansetron (or other 5HT-3)    Patient was instructed to abstain from smoking on day of procedureThe patient is not a current smoker  and patient did not smoke on day of surgery     Postoperative Care  Postoperative pain management:  IV analgesics and Multi-modal analgesia.      Consents  Anesthetic plan, risks, benefits and alternatives discussed with:  Patient..                          .  
No

## 2023-09-03 NOTE — H&P ADULT - NSHPSOCIALHISTORY_GEN_ALL_CORE
remote smoking history (quit in  1960s)  occasional alcohol  denies other drug use    lives alone performs all ADLs and iADLs independently including driving

## 2023-09-03 NOTE — H&P ADULT - PROBLEM SELECTOR PLAN 3
H 8.1 on admission, baseline hemoglobin unknown  - Currently hemodynamically stable, monitor for signs and symptoms of active bleeding  - Consider transfusion for hemoglobin <7  - type and screen in am  - F/u iron panel: iron, ferritin, TIBC, transferrin  - F/u folate, vitamin B12, TSH  - F/u AM CBC PRIMITIVO (unknown baseline Cr) likely 2/2 pre-renal azotemia in the setting of dehydration, poor PO intake  - Baseline creatinine unknown  - presents w/ Cr 1.6  - IVF at 100cc/hr  - Monitor BMP  - Avoid nephrotoxic medications  - Monitor renal indices

## 2023-09-03 NOTE — H&P ADULT - PROBLEM SELECTOR PLAN 2
erythema, pain, swelling LLE surrounding area of surgical incision site  - presents with fever, elevated wbc, however not technically meeting sepsis criteria  - s/p vanc x1, zosyn x1 in ED  - will continue zosyn for now pending ID recs (Dr bang).  - s/p NS bolus in ED< continue NS at 100cc/hr US LLE: thrombosis is seen of the left posterior tibial vein in the mid   calf where there is a perforating vein connected to the greater saphenous   vein and extends down into the posterior tibial vein.  - This appears to be a provoked DVT in the setting of Varithena, which is a known side effect.  - started on heparin drip in ED, continue

## 2023-09-03 NOTE — H&P ADULT - ASSESSMENT
89 y/o f pmhx varicose veins, HTN, hypothyroid, colon cancer s/p resection 2002, osteoporosis, s/p tavr, presents w/ pain, erythema LLE, admitted with confirmed DVT LLE, cellulitis. 89 y/o f pmhx varicose veins, HTN, hypothyroid, colon cancer s/p resection 2002, osteoporosis, s/p tavr, presents w/ pain, erythema LLE, admitted with cellulitis, confirmed DVT.

## 2023-09-03 NOTE — H&P ADULT - NSHPREVIEWOFSYSTEMS_GEN_ALL_CORE
CONSTITUTIONAL: +fever, +chills, +fatigue, +weakness  HEENT: denies sore throat  SKIN: +erythema, pain, swelling left thigh  CARDIOVASCULAR: denies chest pain, chest pressure, palpitations  RESPIRATORY: denies shortness of breath, sputum production  GASTROINTESTINAL: +nausea, +decreased appetite, denies vomiting, diarrhea, abdominal pain  GENITOURINARY: denies dysuria  NEUROLOGICAL: +headache, denies numbness, focal weakness  MUSCULOSKELETAL: denies new joint pain, muscle aches  HEMATOLOGIC: denies gross bleeding  LYMPHATICS: denies enlarged lymph nodes, extremity swelling  ENDOCRINOLOGIC: denies sweating, cold or heat intolerance

## 2023-09-03 NOTE — ED ADULT NURSE NOTE - OBJECTIVE STATEMENT
Pt BIBA from home, A&OX3 for fever and generalized weakness today.  Family states patient had wound on her left lower leg which was not healing and had a vein procedure done last week as an outpatient.  Patient noted to have swelling redness and pain in her left thigh. Site warm to touch. BLLE redness with dry scaly skin. [id number] chest pain, SOB, N/V/D, urinary symptoms. Pt with skin tear on left arm. family at bedside. safety maintained, call bell within reach. Nursing care ongoing.

## 2023-09-03 NOTE — H&P ADULT - PROBLEM SELECTOR PLAN 9
unclear cardiac history, patient had been on lasix 20mg qd for some time however appears to have been discontinued  - will be cautious with IVF  - continue home asa

## 2023-09-03 NOTE — ED ADULT NURSE NOTE - NSFALLHARMRISKINTERV_ED_ALL_ED
Assistance OOB with selected safe patient handling equipment if applicable/Assistance with ambulation/Communicate risk of Fall with Harm to all staff, patient, and family/Monitor gait and stability/Provide visual cue: red socks, yellow wristband, yellow gown, etc/Reinforce activity limits and safety measures with patient and family/Bed in lowest position, wheels locked, appropriate side rails in place/Call bell, personal items and telephone in reach/Instruct patient to call for assistance before getting out of bed/chair/stretcher/Non-slip footwear applied when patient is off stretcher/[place] to call system/Physically safe environment - no spills, clutter or unnecessary equipment/Purposeful Proactive Rounding/Room/bathroom lighting operational, light cord in reach

## 2023-09-03 NOTE — H&P ADULT - NSHPPHYSICALEXAM_GEN_ALL_CORE
T(C): 36.5 (09-03-23 @ 18:56), Max: 39 (09-03-23 @ 14:43)  HR: 72 (09-03-23 @ 19:31) (70 - 90)  BP: 107/53 (09-03-23 @ 19:31) (94/52 - 110/48)  RR: 20 (09-03-23 @ 19:31) (16 - 20)  SpO2: 99% (09-03-23 @ 19:31) (94% - 99%)    GENERAL: patient appears tired, no acute distress, appropriate, pleasant  EYES: sclera clear, no exudates  ENMT: oropharynx clear without erythema, no exudates, moist mucous membranes  NECK: supple, soft, no thyromegaly noted  LUNGS: good air entry bilaterally, clear to auscultation, symmetric breath sounds, no wheezing or rhonchi appreciated  HEART: soft S1/S2, regular rate and rhythm, no murmurs noted  GASTROINTESTINAL: abdomen is soft, nontender, nondistended, normoactive bowel sounds, no palpable masses  INTEGUMENT: good skin turgor, no lesions noted  MUSCULOSKELETAL: no clubbing or cyanosis, no obvious deformity  NEUROLOGIC: awake, alert, oriented x3, good muscle tone in 4 extremities, no obvious sensory deficits  PSYCHIATRIC: mood is good, affect is congruent, linear and logical thought process  SKIN: LLE with medial thigh erythema tracking proximally and distally around small lesion where catheter was placed for procedure. blanching erythema. TTP.

## 2023-09-03 NOTE — PATIENT PROFILE ADULT - FUNCTIONAL ASSESSMENT - BASIC MOBILITY 6.
3-calculated by average/Not able to assess (calculate score using [facility] averaging method)

## 2023-09-03 NOTE — H&P ADULT - PROBLEM SELECTOR PLAN 4
presents with sodium 127. Suspect hypovolemic hyponatremia 2/2 decreased PO intake  - check urine studies, osm  - s/p NS bolus, continue NS at 100cc/hr for now  - f/u am metabolic panel

## 2023-09-03 NOTE — PATIENT PROFILE ADULT - FALL HARM RISK - HARM RISK INTERVENTIONS

## 2023-09-03 NOTE — H&P ADULT - PROBLEM SELECTOR PLAN 1
US LLE: thrombosis is seen of the left posterior tibial vein in the mid   calf where there is a perforating vein connected to the greater saphenous   vein and extends down into the posterior tibial vein.  - started on heparin drip in ED, continue erythema, pain, swelling LLE surrounding area of injection site. Patient had Varithena procedure on Monday (foam injectable for varicose veins)  - presents with fever, elevated wbc, clinically septic.  - s/p vanc x1, zosyn x1 in ED  - will continue zosyn for now pending ID recs (Dr bang).  - f/u limited LLE US to r/o abscess  - s/p NS bolus in ED<, continue NS at 100cc/hr erythema, pain, swelling LLE surrounding area of injection site. Patient had Varithena procedure on Monday (foam injectable for varicose veins)  - presents with fever, elevated wbc, clinically septic.  - s/p vanc x1, zosyn x1 in ED  - check vanco level in AM  - will continue zosyn for now pending ID recs (Dr bang).  - f/u limited LLE US to r/o abscess  - s/p NS bolus in ED<, continue NS at 100cc/hr

## 2023-09-03 NOTE — ED PROVIDER NOTE - NS ED ATTENDING STATEMENT MOD
This was a shared visit with the DEONNA. I reviewed and verified the documentation and independently performed the documented:

## 2023-09-03 NOTE — H&P ADULT - NSICDXPASTSURGICALHX_GEN_ALL_CORE_FT
PAST SURGICAL HISTORY:  S/P cataract surgery     S/P colon resection     S/P knee replacement     S/P TAVR (transcatheter aortic valve replacement)

## 2023-09-03 NOTE — H&P ADULT - ATTENDING COMMENTS
fever, LLE cellulitis s/p Varithena procedure  DVT LLE  -empiric abx - renal dose  -f/up cultures and ID recs  -Monitor hb on heparin drip  -?chronic anemia; check iron studies    PRIMITIVO/hyponatremia, hypovolemic  avoid nephrotoxics. monitor intake and output.   IVF    Mild LFT abn  -trend CMP    plan of care d/w daughter at bedside

## 2023-09-03 NOTE — H&P ADULT - PROBLEM SELECTOR PLAN 5
PRIMITIVO (unknown baseline Cr) likely 2/2 pre-renal azotemia in the setting of dehydration, poor PO intake  - Baseline creatinine unknown  - IVF at 100cc/hr  - Monitor BMP  - Avoid nephrotoxic medications  - Monitor renal indices presents with alkphos 275, AST 90, , unclear etiology (sepsis?). No abd pain, distention  - will check RUQ US, f/u  - trend enzymes presents with alk phos 275, AST 90, , unclear etiology (sepsis?). No abd pain, distention  - will check RUQ US, f/u  - trend enzymes

## 2023-09-03 NOTE — H&P ADULT - NSICDXPASTMEDICALHX_GEN_ALL_CORE_FT
PAST MEDICAL HISTORY:  H/O osteoporosis     History of colon cancer     HTN (hypertension)     Hypothyroid

## 2023-09-04 LAB
ALBUMIN SERPL ELPH-MCNC: 2.1 G/DL — LOW (ref 3.3–5)
ALP SERPL-CCNC: 293 U/L — HIGH (ref 40–120)
ALT FLD-CCNC: 139 U/L — HIGH (ref 12–78)
ANION GAP SERPL CALC-SCNC: 10 MMOL/L — SIGNIFICANT CHANGE UP (ref 5–17)
APPEARANCE UR: CLEAR — SIGNIFICANT CHANGE UP
APTT BLD: 113.7 SEC — HIGH (ref 24.5–35.6)
AST SERPL-CCNC: 78 U/L — HIGH (ref 15–37)
BASOPHILS # BLD AUTO: 0.03 K/UL — SIGNIFICANT CHANGE UP (ref 0–0.2)
BASOPHILS NFR BLD AUTO: 0.3 % — SIGNIFICANT CHANGE UP (ref 0–2)
BILIRUB SERPL-MCNC: 0.7 MG/DL — SIGNIFICANT CHANGE UP (ref 0.2–1.2)
BILIRUB UR-MCNC: NEGATIVE — SIGNIFICANT CHANGE UP
BUN SERPL-MCNC: 31 MG/DL — HIGH (ref 7–23)
CALCIUM SERPL-MCNC: 7.1 MG/DL — LOW (ref 8.5–10.1)
CHLORIDE SERPL-SCNC: 111 MMOL/L — HIGH (ref 96–108)
CO2 SERPL-SCNC: 15 MMOL/L — LOW (ref 22–31)
COLOR SPEC: YELLOW — SIGNIFICANT CHANGE UP
CREAT ?TM UR-MCNC: 27 MG/DL — SIGNIFICANT CHANGE UP
CREAT SERPL-MCNC: 1.4 MG/DL — HIGH (ref 0.5–1.3)
DIFF PNL FLD: ABNORMAL
EGFR: 35 ML/MIN/1.73M2 — LOW
EOSINOPHIL # BLD AUTO: 0.07 K/UL — SIGNIFICANT CHANGE UP (ref 0–0.5)
EOSINOPHIL NFR BLD AUTO: 0.7 % — SIGNIFICANT CHANGE UP (ref 0–6)
FERRITIN SERPL-MCNC: 199 NG/ML — SIGNIFICANT CHANGE UP (ref 13–330)
FOLATE SERPL-MCNC: >20 NG/ML — SIGNIFICANT CHANGE UP
GLUCOSE SERPL-MCNC: 154 MG/DL — HIGH (ref 70–99)
GLUCOSE UR QL: NEGATIVE MG/DL — SIGNIFICANT CHANGE UP
GRAM STN FLD: SIGNIFICANT CHANGE UP
HCT VFR BLD CALC: 17.7 % — CRITICAL LOW (ref 34.5–45)
HCT VFR BLD CALC: 29.6 % — LOW (ref 34.5–45)
HCT VFR BLD CALC: 31.6 % — LOW (ref 34.5–45)
HGB BLD-MCNC: 10.2 G/DL — LOW (ref 11.5–15.5)
HGB BLD-MCNC: 6 G/DL — CRITICAL LOW (ref 11.5–15.5)
HGB BLD-MCNC: 9.7 G/DL — LOW (ref 11.5–15.5)
IMM GRANULOCYTES NFR BLD AUTO: 0.8 % — SIGNIFICANT CHANGE UP (ref 0–0.9)
IRON SATN MFR SERPL: 10 UG/DL — LOW (ref 30–160)
IRON SATN MFR SERPL: 7 % — LOW (ref 14–50)
KETONES UR-MCNC: NEGATIVE MG/DL — SIGNIFICANT CHANGE UP
LEUKOCYTE ESTERASE UR-ACNC: ABNORMAL
LYMPHOCYTES # BLD AUTO: 0.45 K/UL — LOW (ref 1–3.3)
LYMPHOCYTES # BLD AUTO: 4.5 % — LOW (ref 13–44)
MCHC RBC-ENTMCNC: 29.2 PG — SIGNIFICANT CHANGE UP (ref 27–34)
MCHC RBC-ENTMCNC: 29.7 PG — SIGNIFICANT CHANGE UP (ref 27–34)
MCHC RBC-ENTMCNC: 30.9 PG — SIGNIFICANT CHANGE UP (ref 27–34)
MCHC RBC-ENTMCNC: 32.3 GM/DL — SIGNIFICANT CHANGE UP (ref 32–36)
MCHC RBC-ENTMCNC: 32.8 GM/DL — SIGNIFICANT CHANGE UP (ref 32–36)
MCHC RBC-ENTMCNC: 33.9 GM/DL — SIGNIFICANT CHANGE UP (ref 32–36)
MCV RBC AUTO: 89.2 FL — SIGNIFICANT CHANGE UP (ref 80–100)
MCV RBC AUTO: 91.2 FL — SIGNIFICANT CHANGE UP (ref 80–100)
MCV RBC AUTO: 91.9 FL — SIGNIFICANT CHANGE UP (ref 80–100)
METHOD TYPE: SIGNIFICANT CHANGE UP
MONOCYTES # BLD AUTO: 0.67 K/UL — SIGNIFICANT CHANGE UP (ref 0–0.9)
MONOCYTES NFR BLD AUTO: 6.7 % — SIGNIFICANT CHANGE UP (ref 2–14)
MSSA DNA SPEC QL NAA+PROBE: SIGNIFICANT CHANGE UP
NEUTROPHILS # BLD AUTO: 8.69 K/UL — HIGH (ref 1.8–7.4)
NEUTROPHILS NFR BLD AUTO: 87 % — HIGH (ref 43–77)
NITRITE UR-MCNC: NEGATIVE — SIGNIFICANT CHANGE UP
NRBC # BLD: 0 /100 WBCS — SIGNIFICANT CHANGE UP (ref 0–0)
OB PNL STL: NEGATIVE — SIGNIFICANT CHANGE UP
OSMOLALITY SERPL: 290 MOSMOL/KG — SIGNIFICANT CHANGE UP (ref 280–301)
OSMOLALITY UR: 132 MOSM/KG — SIGNIFICANT CHANGE UP (ref 50–1200)
PH UR: 6.5 — SIGNIFICANT CHANGE UP (ref 5–8)
PLATELET # BLD AUTO: 110 K/UL — LOW (ref 150–400)
PLATELET # BLD AUTO: 121 K/UL — LOW (ref 150–400)
PLATELET # BLD AUTO: 160 K/UL — SIGNIFICANT CHANGE UP (ref 150–400)
POTASSIUM SERPL-MCNC: 4 MMOL/L — SIGNIFICANT CHANGE UP (ref 3.5–5.3)
POTASSIUM SERPL-SCNC: 4 MMOL/L — SIGNIFICANT CHANGE UP (ref 3.5–5.3)
POTASSIUM UR-SCNC: 18.3 MMOL/L — SIGNIFICANT CHANGE UP
PROT ?TM UR-MCNC: 15 MG/DL — HIGH (ref 0–12)
PROT SERPL-MCNC: 5.7 G/DL — LOW (ref 6–8.3)
PROT UR-MCNC: SIGNIFICANT CHANGE UP MG/DL
PROT/CREAT UR-RTO: 0.6 RATIO — HIGH (ref 0–0.2)
RBC # BLD: 1.94 M/UL — LOW (ref 3.8–5.2)
RBC # BLD: 3.32 M/UL — LOW (ref 3.8–5.2)
RBC # BLD: 3.44 M/UL — LOW (ref 3.8–5.2)
RBC # FLD: 14.8 % — HIGH (ref 10.3–14.5)
RBC # FLD: 16.3 % — HIGH (ref 10.3–14.5)
RBC # FLD: 16.8 % — HIGH (ref 10.3–14.5)
SODIUM SERPL-SCNC: 136 MMOL/L — SIGNIFICANT CHANGE UP (ref 135–145)
SODIUM UR-SCNC: <20 MMOL/L — SIGNIFICANT CHANGE UP
SP GR SPEC: 1 — SIGNIFICANT CHANGE UP (ref 1–1.03)
SPECIMEN SOURCE: SIGNIFICANT CHANGE UP
SPECIMEN SOURCE: SIGNIFICANT CHANGE UP
TIBC SERPL-MCNC: 133 UG/DL — LOW (ref 220–430)
TRANSFERRIN SERPL-MCNC: 119 MG/DL — LOW (ref 200–360)
TSH SERPL-MCNC: 0.99 UIU/ML — SIGNIFICANT CHANGE UP (ref 0.36–3.74)
UIBC SERPL-MCNC: 123 UG/DL — SIGNIFICANT CHANGE UP (ref 110–370)
UROBILINOGEN FLD QL: 1 MG/DL — SIGNIFICANT CHANGE UP (ref 0.2–1)
UUN UR-MCNC: 380 MG/DL — SIGNIFICANT CHANGE UP
VANCOMYCIN FLD-MCNC: 7.3 UG/ML — LOW (ref 10–25)
VIT B12 SERPL-MCNC: >2000 PG/ML — HIGH (ref 232–1245)
WBC # BLD: 9.26 K/UL — SIGNIFICANT CHANGE UP (ref 3.8–10.5)
WBC # BLD: 9.7 K/UL — SIGNIFICANT CHANGE UP (ref 3.8–10.5)
WBC # BLD: 9.99 K/UL — SIGNIFICANT CHANGE UP (ref 3.8–10.5)
WBC # FLD AUTO: 9.26 K/UL — SIGNIFICANT CHANGE UP (ref 3.8–10.5)
WBC # FLD AUTO: 9.7 K/UL — SIGNIFICANT CHANGE UP (ref 3.8–10.5)
WBC # FLD AUTO: 9.99 K/UL — SIGNIFICANT CHANGE UP (ref 3.8–10.5)

## 2023-09-04 PROCEDURE — 76705 ECHO EXAM OF ABDOMEN: CPT | Mod: 26

## 2023-09-04 PROCEDURE — 93010 ELECTROCARDIOGRAM REPORT: CPT

## 2023-09-04 PROCEDURE — 99233 SBSQ HOSP IP/OBS HIGH 50: CPT

## 2023-09-04 RX ORDER — PANTOPRAZOLE SODIUM 20 MG/1
40 TABLET, DELAYED RELEASE ORAL ONCE
Refills: 0 | Status: COMPLETED | OUTPATIENT
Start: 2023-09-04 | End: 2023-09-04

## 2023-09-04 RX ORDER — SODIUM BICARBONATE 1 MEQ/ML
650 SYRINGE (ML) INTRAVENOUS
Refills: 0 | Status: COMPLETED | OUTPATIENT
Start: 2023-09-04 | End: 2023-09-07

## 2023-09-04 RX ORDER — SODIUM CHLORIDE 9 MG/ML
1000 INJECTION, SOLUTION INTRAVENOUS
Refills: 0 | Status: COMPLETED | OUTPATIENT
Start: 2023-09-04 | End: 2023-09-05

## 2023-09-04 RX ORDER — CEFAZOLIN SODIUM 1 G
VIAL (EA) INJECTION
Refills: 0 | Status: DISCONTINUED | OUTPATIENT
Start: 2023-09-04 | End: 2023-09-04

## 2023-09-04 RX ORDER — CEFAZOLIN SODIUM 1 G
2000 VIAL (EA) INJECTION EVERY 12 HOURS
Refills: 0 | Status: DISCONTINUED | OUTPATIENT
Start: 2023-09-05 | End: 2023-09-06

## 2023-09-04 RX ORDER — CEFAZOLIN SODIUM 1 G
2000 VIAL (EA) INJECTION ONCE
Refills: 0 | Status: COMPLETED | OUTPATIENT
Start: 2023-09-04 | End: 2023-09-04

## 2023-09-04 RX ORDER — ASPIRIN/CALCIUM CARB/MAGNESIUM 324 MG
81 TABLET ORAL DAILY
Refills: 0 | Status: DISCONTINUED | OUTPATIENT
Start: 2023-09-05 | End: 2023-09-09

## 2023-09-04 RX ORDER — PANTOPRAZOLE SODIUM 20 MG/1
40 TABLET, DELAYED RELEASE ORAL DAILY
Refills: 0 | Status: DISCONTINUED | OUTPATIENT
Start: 2023-09-04 | End: 2023-09-09

## 2023-09-04 RX ADMIN — Medication 100 MICROGRAM(S): at 05:48

## 2023-09-04 RX ADMIN — HEPARIN SODIUM 1100 UNIT(S)/HR: 5000 INJECTION INTRAVENOUS; SUBCUTANEOUS at 07:46

## 2023-09-04 RX ADMIN — PANTOPRAZOLE SODIUM 40 MILLIGRAM(S): 20 TABLET, DELAYED RELEASE ORAL at 15:16

## 2023-09-04 RX ADMIN — PANTOPRAZOLE SODIUM 40 MILLIGRAM(S): 20 TABLET, DELAYED RELEASE ORAL at 05:48

## 2023-09-04 RX ADMIN — SODIUM CHLORIDE 75 MILLILITER(S): 9 INJECTION, SOLUTION INTRAVENOUS at 18:58

## 2023-09-04 RX ADMIN — Medication 100 MILLIGRAM(S): at 18:57

## 2023-09-04 RX ADMIN — PIPERACILLIN AND TAZOBACTAM 25 GRAM(S): 4; .5 INJECTION, POWDER, LYOPHILIZED, FOR SOLUTION INTRAVENOUS at 06:41

## 2023-09-04 RX ADMIN — PIPERACILLIN AND TAZOBACTAM 25 GRAM(S): 4; .5 INJECTION, POWDER, LYOPHILIZED, FOR SOLUTION INTRAVENOUS at 15:16

## 2023-09-04 RX ADMIN — Medication 650 MILLIGRAM(S): at 18:53

## 2023-09-04 NOTE — CHART NOTE - NSCHARTNOTEFT_GEN_A_CORE
RN called, pt with critical value  - Pt admitted with LE DVT, started on heparin gtt  - cbc in ED showed H/H 8.1/24.1 -> now 6.0/17.7  - pt with no overt signs of bleeding, pt denies bloody or black stools however does have hx of colon cancer  - pt received a 1150ml NS bolus x1 in ED and is on maintenance fluids @ 100cc/hr, may be dilutional vs active bleed  - pt with BP 96/42  - pt asymptomatic, denies SOB, CP, dizziness  - type and screen ordered STAT  - FOBT and iron studies STAT  - blood consent signed and in chart  - will transfuse 1u pRBCs over 3hrs  - repeat CBC in AM  - RN to please call with any changes RN called, pt with critical value  - Pt admitted with LE DVT, started on heparin gtt  - cbc in ED showed H/H 8.1/24.1 -> now 6.0/17.7  - pt with no overt signs of bleeding, pt denies bloody or black stools however does have hx of colon cancer  - pt received a 1150ml NS bolus x1 in ED and is on maintenance fluids @ 100cc/hr, may be dilutional vs active bleed  - pt with BP 96/42  - pt asymptomatic, denies SOB, CP, dizziness  - type and screen ordered STAT  - FOBT and iron studies STAT  - blood consent signed and in chart  - will transfuse 1u pRBCs over 3hrs  - repeat CBC in AM  - RN to please call with any changes    --------------------------------    ADDENDUM    Called by RN with critical value, one bottle blood cx showed growth of gram positive cocci in clusters   - PCR pending, may be contaminant  - Pt currently on IV Zosyn q8 which offers coverage  - ID consulted  - No acute intervention needed at this time  - RN to call with any changes

## 2023-09-04 NOTE — CARE COORDINATION ASSESSMENT. - NSPASTMEDSURGHISTORY_GEN_ALL_CORE_FT
PAST MEDICAL & SURGICAL HISTORY:  History of colon cancer      Hypothyroid      H/O osteoporosis      HTN (hypertension)      S/P cataract surgery      S/P knee replacement      S/P colon resection      S/P TAVR (transcatheter aortic valve replacement)

## 2023-09-04 NOTE — CHART NOTE - NSCHARTNOTEFT_GEN_A_CORE
Called by Pharmacy, pt started on cefazolin 2g q8 by the primary/ID team however pt needs renal dosing as Cr clearance is 23.   - Pharmacy recommending q12 dosing of cefazolin  - Order placed for q12 dosing for now given pts renal status  - Primary team and ID team to reassess in AM

## 2023-09-04 NOTE — PROGRESS NOTE ADULT - ASSESSMENT
89 y/o f pmhx varicose veins, HTN, hypothyroid, colon cancer s/p resection 2002, osteoporosis, s/p tavr, presents w/ pain, erythema LLE, admitted with cellulitis, confirmed DVT.    1) Sepsis secondary to LLE Cellulitis.   - erythema, pain, swelling LLE surrounding area of injection site. Patient had Varithena procedure on Monday (foam injectable for varicose veins)  - presents with fever, elevated wbc, clinically septic.  - s/p vanc x1, zosyn x1 in ED  - Blood cultures with MSSA  - Follow repeat blood cultures   - ECHO to rule out endocarditis   - continue zosyn for now pending ID recs (Dr bang).  - s/p NS bolus in ED, continue IVF    2) LLE DVT   US LLE: thrombosis is seen of the left posterior tibial vein in the mid calf where there is a perforating vein connected to the greater saphenous vein and extends down into the posterior tibial vein.  - This appears to be a provoked DVT in the setting of Varithena, which is a known side effect.  - started on heparin drip in ED, holding it for now due to anemia. Will resume if H&H remains stable.     3) Anemia  - FOBT negative   - s/p 1 PRBC  - Monitor H&H  - GI Consulted given history of colon cancer, recs appreciated     4) Renal Insufficiency   - Unknown baseline     - presents w/ Cr 1.6  - IVF   - Avoid nephrotoxic medications  - Monitor renal indices.    5) Hyponatremia.   presents with sodium 127. Suspect hypovolemic hyponatremia 2/2 decreased PO intake  - Improved with IVF  - Encourage for oral intake     6) Elevated liver enzymes.   presents with alk phos 275, AST 90, , unclear etiology (sepsis?). No abd pain, distention  - RUQ US performed, report pending   - trend enzymes.    7) HTN (hypertension).   on irbesartan, hold in setting of renal insufficiency   - Monitor BP, currently stable     8) Hypothyroid.   - continue home synthroid.    9) S/P TAVR (transcatheter aortic valve replacement).   unclear cardiac history, patient had been on lasix 20mg qd for some time however appears to have been discontinued  - will be cautious with IVF  - continue home asa.  - TTE to r/o Endocarditis     DVT Prophylaxis -- Venodyne. Holding Heparin Infusion due to anemia.     Dispo: Unclear at this time pending progress.

## 2023-09-04 NOTE — CONSULT NOTE ADULT - SUBJECTIVE AND OBJECTIVE BOX
[facility], Division of Infectious Diseases  RUSTY Arriaga S. Shah, Y. Patel, [facility]  [phone]    SOLOMON, , Female  [id number]    HPI--  HPI:  89 y/o f pmhx varicose veins, HTN, hypothyroid, colon cancer s/p resection , osteoporosis, s/p tavr, presents w/ pain, erythema LLE. On Monday, patient had Varithena procedure for varicose veins. On friday, patient followed up with wound care who did not like the look of the surrounding area, sent back to vascular. Vascular performed a lle dvt study which didn't demonstrate any clots. Vascular believed this may be a normal inflammatory reaction vs allergic rxn (?) to varithena injectable, sent patient home on pepcid, prednisone and benadryl to be taken at night. She then woke up on saturday with fever, chills. Complaining of nausea, headache, and pain of the LLE. She has also had decreased appetite. Patient states she feels "lousy" and has no other specific complaints. Denies chest pain, shortness of breath, abd pain, vomiting, diarrhea.    ED course:  vitals - bp 94/52, rr 17, hr 70, t 97.7, spo2 99  labs - wbc 11.67, hgb 8.1, na 127, bun/cr 46/1.6, gluc 187, ca 7.2, alb 2.3, alk phos 275, ast 90, alt 147  imaging - US duplex venous lower ext ltd - Acute deep venous thrombosis: below the knee.  EKG - pending  meds - tylenol x1, ns bolus, vanc x1, zosyn x1, started on heparin drip (03 Sep 2023 19:25)    Pt seen at bedside  Family reporting not much improvement but better than admission  Hx as above      Active Medications--  acetaminophen     Tablet .. 650 milliGRAM(s) Oral every 6 hours PRN  aluminum hydroxide/magnesium hydroxide/simethicone Suspension 30 milliLiter(s) Oral every 4 hours PRN  influenza  Vaccine (HIGH DOSE) 0.7 milliLiter(s) IntraMuscular once  lactated ringers. 1000 milliLiter(s) IV Continuous <Continuous>  levothyroxine 100 MICROGram(s) Oral daily  melatonin 3 milliGRAM(s) Oral at bedtime PRN  ondansetron Injectable 4 milliGRAM(s) IV Push every 8 hours PRN  pantoprazole    Tablet 40 milliGRAM(s) Oral daily  piperacillin/tazobactam IVPB.. 3.375 Gram(s) IV Intermittent every 8 hours  sodium bicarbonate 650 milliGRAM(s) Oral two times a day    Antimicrobials:   piperacillin/tazobactam IVPB.. 3.375 Gram(s) IV Intermittent every 8 hours    Immunologic: influenza  Vaccine (HIGH DOSE) 0.7 milliLiter(s) IntraMuscular once      ROS:  CONSTITUTIONAL: No fevers or chills. No weakness or headache. No weight changes.  EYES/ENT: No visual or hearing changes. No sore throat or throat pain .  NECK: No pain or stiffness  RESPIRATORY: No cough, wheezing, or hemoptysis. No shortness of breath  CARDIOVASCULAR: No chest pain or palpitations  GASTROINTESTINAL: No abdominal pain. No nausea or vomiting. No diarrhea or constipation.  GENITOURINARY: No dysuria, frequency or hematuria  NEUROLOGICAL: No numbness or weakness  SKIN: No itching or rashes  PSYCHIATRIC: Pleasant. Appropriate affect    Allergies: No Known Allergies    PMH -- HTN (hypertension)    HLD (hyperlipidemia)    H/O osteoporosis    Hypothyroid    Colon cancer    History of colon cancer      PSH -- S/P TAVR (transcatheter aortic valve replacement)    S/P colon resection    S/P knee replacement    S/P cataract surgery      FH --   Social History --  EtOH: denies   Tobacco: denies   Drug Use: denies     Travel/Environmental/Occupational History:    Physical Exam--  Vital Signs Last 24 Hrs  T(F): 98.3 (04 Sep 2023 14:33), Max: 98.9 (03 Sep 2023 22:49)  HR: 70 (04 Sep 2023 14:33) (62 - 75)  BP: 128/54 (04 Sep 2023 14:33) (91/44 - 128/54)  RR: 17 (04 Sep 2023 14:33) (16 - 20)  SpO2: 93% (04 Sep 2023 14:33) (93% - 99%)  General: nontoxic-appearing, no acute distress  HEENT: NC/AT, EOMI,  Lungs: Clear bilaterally without rales, wheezing or rhonchi  Heart: Regular rate and rhythm. No murmur, rub or gallop.  Abdomen: Soft. NTND  Extremities: LLE thigh w/ swelling, erythema and TTP  Skin: Warm. Dry. b/l LE chronic dermatitis and venous changes on LLE    Laboratory & Imaging Data:  CBC:                       9.7    9.99  )-----------( 160      ( 04 Sep 2023 09:05 )             29.6     CMP:     136  |  111<H>  |  31<H>  ----------------------------<  154<H>  4.0   |  15<L>  |  1.40<H>    Ca    7.1<L>      04 Sep 2023 09:05    TPro  5.7<L>  /  Alb  2.1<L>  /  TBili  0.7  /  DBili  x   /  AST  78<H>  /  ALT  139<H>  /  AlkPhos  293<H>      LIVER FUNCTIONS - ( 04 Sep 2023 09:05 )  Alb: 2.1 g/dL / Pro: 5.7 g/dL / ALK PHOS: 293 U/L / ALT: 139 U/L / AST: 78 U/L / GGT: x           Urinalysis Basic - ( 04 Sep 2023 10:15 )    Color: Yellow / Appearance: Clear / S.005 / pH: x  Gluc: x / Ketone: Negative mg/dL  / Bili: Negative / Urobili: 1.0 mg/dL   Blood: x / Protein: Trace mg/dL / Nitrite: Negative   Leuk Esterase: Small / RBC: 3 /HPF / WBC 3 /HPF   Sq Epi: x / Non Sq Epi: x / Bacteria: Few /HPF        Microbiology: reviewed    Culture - Blood (collected 23 @ 15:25)  Source: .Blood Blood-Peripheral  Gram Stain (23 @ 07:25):    Growth in aerobic and anaerobic bottles: Gram Positive Cocci in Clusters  Preliminary Report (23 @ 07:25):    Growth in aerobic and anaerobic bottles: Gram Positive Cocci in Clusters    Culture - Blood (collected 23 @ 15:15)  Source: .Blood Blood-Peripheral  Gram Stain (23 @ 08:01):    Growth in aerobic bottle: Gram Positive Cocci in Clusters    Growth in anaerobic bottle: Gram Positive Cocci in Clusters  Preliminary Report (23 @ 08:02):    Growth in aerobic bottle: Gram Positive Cocci in Clusters    Growth in anaerobic bottle: Gram Positive Cocci in Clusters    Direct identification is available within approximately 3-5    hours either by Blood Panel Multiplexed PCR or Direct    MALDI-TOF. Details: [email or web address]  Organism: Blood Culture PCR (23 @ 07:13)  Organism: Blood Culture PCR (23 @ 07:13)      Method Type: PCR      -  Methicillin SENSITIVE Staphylococcus aureus (MSSA): Detec Any isolate of Staphylococcus aureus from a blood culture is NOT considered a contaminant.          Radiology: reviewed    < from: US Abdomen Upper Quadrant Right (23 @ 08:31) >    ACC: [id number] EXAM:  US ABDOMEN RT UPR QUADRANT   ORDERED BY: BARAK HAWKINS     PROCEDURE DATE:  2023          INTERPRETATION:  CLINICAL INFORMATION: Abnormal liver function tests.    COMPARISON: None available.    TECHNIQUE: Sonography of the right upper quadrant.    FINDINGS:  Liver: Normal in size and echogenicity. No focal lesions identified.  Bile ducts: Normal caliber. Common bile duct measures 5 mm.  Gallbladder: Within normal limits. No stones or gallbladder wall   thickening. No sonographic Selby's sign.  Pancreas: Poorly visualized.  Right kidney: 6.5 cm. No hydronephrosis. Atrophic.  Ascites: None.  IVC: Visualized portions are within normal limits. Diffuse   atherosclerotic calcifications of the abdominal aorta.    IMPRESSION:  The liver is normal in size and echogenicity without focal lesions.    Atrophic right kidney.        --- End of Report ---            EH PALOMINO MD; Attending Radiologist  This document has been electronically signed. Sep  4 2023  2:36PM    < end of copied text >  < from: US Duplex Venous Lower Ext Ltd, Left (23 @ 16:35) >    ACC: [id number] EXAM:  US DPLX LWR EXT VEINS LTD LT   ORDERED BY: BEV RHODES     PROCEDURE DATE:  2023          INTERPRETATION:  CLINICAL INFORMATION: Redness and swelling of the lower   extremity    COMPARISON: None available.    TECHNIQUE:Duplex sonography of the LEFT LOWER extremity veins with color   and spectral Doppler, with and without compression.    FINDINGS:    There is normal compressibility of the left common femoral, femoral and   popliteal veins. The contralateral common femoral vein is patent.  Doppler examination shows normal spontaneous and phasic flow of the veins   above the knee.    However, thrombosis is seen of the left posterior tibial vein in the mid   calf where there is a perforating vein connected to the greater saphenous   vein and extends down into the posterior tibial vein.      IMPRESSION:  Acute deep venous thrombosis: below the knee.        Findings were discussed with Nawaf Lamb 9/3/2023 5:07 PM by Dr. Castano with read back confirmation.    --- End of Report ---            DAWIT CASTANO MD; Attending Radiologist  This document has been electronically signed. Sep  3 2023  5:08PM    < end of copied text >  
[facility]  Cuco Parada PA-C  [address] [place] [address] [id number] [phone]      Chief Complaint:  Patient is a 89y old  Female who presents with a chief complaint of DVT / cellulitis (03 Sep 2023 19:25)      HPI:  89 y/o f pmhx varicose veins, HTN, hypothyroid, colon cancer s/p resection , osteoporosis, s/p tavr, presents w/ pain, erythema LLE. On Monday, patient had Varithena procedure for varicose veins. On friday, patient followed up with wound care who did not like the look of the surrounding area, sent back to vascular. Vascular performed a lle dvt study which didn't demonstrate any clots. Vascular believed this may be a normal inflammatory reaction vs allergic rxn (?) to varithena injectable, sent patient home on pepcid, prednisone and benadryl to be taken at night. She then woke up on saturday with fever, chills. Complaining of nausea, headache, and pain of the LLE. She has also had decreased appetite. Patient states she feels "lousy" and has no other specific complaints. Denies chest pain, shortness of breath, abd pain, vomiting, diarrhea.    INTERVAL HPI:  Pt s/e with  at bedside  Discussed same hx as above  Pt was started on heparin and hgb dropped overnight  Now hgb improved s/p pRBC overnight  Currently has no GI complaints; awaiting FOBT  Hx of colon CA resected in . Last colonoscopy about 7 years ago; pt states was normal and she was told no further colonoscopies required    Allergies:  morphine (Nausea)  No Known Allergies      Medications:  acetaminophen     Tablet .. 650 milliGRAM(s) Oral every 6 hours PRN  aluminum hydroxide/magnesium hydroxide/simethicone Suspension 30 milliLiter(s) Oral every 4 hours PRN  influenza  Vaccine (HIGH DOSE) 0.7 milliLiter(s) IntraMuscular once  levothyroxine 100 MICROGram(s) Oral daily  melatonin 3 milliGRAM(s) Oral at bedtime PRN  ondansetron Injectable 4 milliGRAM(s) IV Push every 8 hours PRN  piperacillin/tazobactam IVPB.. 3.375 Gram(s) IV Intermittent every 8 hours  sodium chloride 0.9%. 1000 milliLiter(s) IV Continuous <Continuous>      PMHX/PSHX:  HTN (hypertension)    HLD (hyperlipidemia)    H/O osteoporosis    Hypothyroid    Colon cancer    History of colon cancer    S/P TAVR (transcatheter aortic valve replacement)    S/P colon resection    S/P knee replacement    S/P cataract surgery      ROS:     General:  No fevers, chills, night sweats, fatigue,   Eyes:  Good vision, no reported pain  ENT:  No sore throat, pain, runny nose, dysphagia  CV:  No pain, palpitations, hypo/hypertension  Resp:  No dyspnea, cough, tachypnea, wheezing  GI:  No pain, No nausea, No vomiting, No diarrhea, No constipation, No weight loss, No fever, No pruritis, No rectal bleeding, No tarry stools, No dysphagia,  :  No pain, bleeding, incontinence, nocturia  Muscle:  No pain, weakness  Neuro:  No weakness, tingling, memory problems  Psych:  No fatigue, insomnia, mood problems, depression  Endocrine:  No polyuria, polydipsia, cold/heat intolerance  Heme:  No petechiae, ecchymosis, easy bruisability  Skin:  No rash, tattoos, scars, edema      PHYSICAL EXAM:   Vital Signs:  Vital Signs Last 24 Hrs  T(C): 36.6 (04 Sep 2023 06:41), Max: 39 (03 Sep 2023 14:43)  T(F): 97.8 (04 Sep 2023 06:41), Max: 102.2 (03 Sep 2023 14:43)  HR: 63 (04 Sep 2023 06:41) (62 - 90)  BP: 121/39 (04 Sep 2023 06:41) (91/44 - 121/39)  BP(mean): --  RR: 20 (04 Sep 2023 06:41) (16 - 20)  SpO2: 94% (04 Sep 2023 06:41) (94% - 99%)    Parameters below as of 04 Sep 2023 06:41  Patient On (Oxygen Delivery Method): room air      Daily Height in cm: 157.48 (03 Sep 2023 14:43)    Daily Weight in k.3 (04 Sep 2023 05:26)    GENERAL:  Appears stated age,   HEENT:  NC/AT,    CHEST:  Full & symmetric excursion,   HEART:  Regular rhythm  ABDOMEN:  Soft, non-tender, non-distended  EXTEREMITIES:  LLE with erythema  SKIN:  No rash  NEURO:  Alert,    LABS:                        9.7    9.99  )-----------( 160      ( 04 Sep 2023 09:05 )             29.6     09-    136  |  111<H>  |  31<H>  ----------------------------<  154<H>  4.0   |  15<L>  |  1.40<H>    Ca    7.1<L>      04 Sep 2023 09:05    TPro  5.7<L>  /  Alb  2.1<L>  /  TBili  0.7  /  DBili  x   /  AST  78<H>  /  ALT  139<H>  /  AlkPhos  293<H>  09    LIVER FUNCTIONS - ( 04 Sep 2023 09:05 )  Alb: 2.1 g/dL / Pro: 5.7 g/dL / ALK PHOS: 293 U/L / ALT: 139 U/L / AST: 78 U/L / GGT: x           PT/INR - ( 03 Sep 2023 15:15 )   PT: 11.2 sec;   INR: 0.96 ratio         PTT - ( 04 Sep 2023 00:39 )  PTT:113.7 sec  Urinalysis Basic - ( 04 Sep 2023 09:05 )    Color: x / Appearance: x / SG: x / pH: x  Gluc: 154 mg/dL / Ketone: x  / Bili: x / Urobili: x   Blood: x / Protein: x / Nitrite: x   Leuk Esterase: x / RBC: x / WBC x   Sq Epi: x / Non Sq Epi: x / Bacteria: x

## 2023-09-04 NOTE — PROGRESS NOTE ADULT - SUBJECTIVE AND OBJECTIVE BOX
Cellulitis    HPI:  89 y/o f pmhx varicose veins, HTN, hypothyroid, colon cancer s/p resection , osteoporosis, s/p tavr, presents w/ pain, erythema LLE. On Monday, patient had Varithena procedure for varicose veins. On friday, patient followed up with wound care who did not like the look of the surrounding area, sent back to vascular. Vascular performed a lle dvt study which didn't demonstrate any clots. Vascular believed this may be a normal inflammatory reaction vs allergic rxn (?) to varithena injectable, sent patient home on pepcid, prednisone and benadryl to be taken at night. She then woke up on saturday with fever, chills. Complaining of nausea, headache, and pain of the LLE. She has also had decreased appetite. Patient states she feels "lousy" and has no other specific complaints. Denies chest pain, shortness of breath, abd pain, vomiting, diarrhea. (03 Sep 2023 19:25)    Interval History:  Patient was seen and examined at bedside around 9 am.  Complaining of shakes at times.  Has some pain in left leg.   Denies abdominal pain, nausea, vomiting or abdominal pain.   Denies chest pain, palpitations, shortness of breath, headache, dizziness or visual symptoms.    ROS:  As per interval history otherwise unremarkable.    PHYSICAL EXAM:  Vital Signs   T(C): 36.6 (04 Sep 2023 06:41), Max: 39 (03 Sep 2023 14:43)  T(F): 97.8 (04 Sep 2023 06:41), Max: 102.2 (03 Sep 2023 14:43)  HR: 63 (04 Sep 2023 06:41) (62 - 90)  BP: 121/39 (04 Sep 2023 06:41) (91/44 - 121/39)  RR: 20 (04 Sep 2023 06:41) (16 - 20)  SpO2: 94% (04 Sep 2023 06:41) (94% - 99%)  Parameters below as of 04 Sep 2023 06:41  Patient On (Oxygen Delivery Method): room air  General: Elderly female sitting in bed comfortably. No acute distress  HEENT: EOMI. Clear conjunctivae. Moist mucus membrane  Neck: Supple.   Chest: CTA bilaterally. No wheezing, rales or rhonchi.   Heart: Normal S1 & S2. RRR.   Abdomen: Protruberant. Soft. Non-tender. + BS  Ext: LLE: Chronic skin changes with erythema and warmth around shin. Trace pedal edema. + calf tenderness. RLE: Trace pedal edema. No calf tenderness.    Neuro: Active and alert. No focal deficit. No speech disorder  Skin: Warm and Dry  Psychiatry: Normal mood and affect    LABS:                      9.7    9.99  )-----------( 160      ( 04 Sep 2023 09:05 )             29.6         136  |  111<H>  |  31<H>  ----------------------------<  154<H>  4.0   |  15<L>  |  1.40<H>    Ca    7.1<L>      04 Sep 2023 09:05    TPro  5.7<L>  /  Alb  2.1<L>  /  TBili  0.7  /  DBili  x   /  AST  78<H>  /  ALT  139<H>  /  AlkPhos  293<H>      PT/INR - ( 03 Sep 2023 15:15 )   PT: 11.2 sec;   INR: 0.96 ratio       PTT - ( 04 Sep 2023 00:39 )  PTT:113.7 sec  Urinalysis Basic - ( 04 Sep 2023 10:15 )    Color: Yellow / Appearance: Clear / S.005 / pH: x  Gluc: x / Ketone: Negative mg/dL  / Bili: Negative / Urobili: 1.0 mg/dL   Blood: x / Protein: Trace mg/dL / Nitrite: Negative   Leuk Esterase: Small / RBC: 3 /HPF / WBC 3 /HPF   Sq Epi: x / Non Sq Epi: x / Bacteria: Few /HPF    RADIOLOGY & ADDITIONAL STUDIES:  Reviewed     MEDICATIONS  (STANDING):  influenza  Vaccine (HIGH DOSE) 0.7 milliLiter(s) IntraMuscular once  levothyroxine 100 MICROGram(s) Oral daily  piperacillin/tazobactam IVPB.. 3.375 Gram(s) IV Intermittent every 8 hours  sodium chloride 0.9%. 1000 milliLiter(s) (100 mL/Hr) IV Continuous <Continuous>    MEDICATIONS  (PRN):  acetaminophen     Tablet .. 650 milliGRAM(s) Oral every 6 hours PRN Temp greater or equal to 38C (100.4F), Mild Pain (1 - 3)  aluminum hydroxide/magnesium hydroxide/simethicone Suspension 30 milliLiter(s) Oral every 4 hours PRN Dyspepsia  melatonin 3 milliGRAM(s) Oral at bedtime PRN Insomnia  ondansetron Injectable 4 milliGRAM(s) IV Push every 8 hours PRN Nausea and/or Vomiting

## 2023-09-04 NOTE — CARE COORDINATION ASSESSMENT. - NSCAREPROVIDERS_GEN_ALL_CORE_FT
CARE PROVIDERS:  Accepting Physician: Nelson Rich  Admitting: Nelson Rich  Attending: Nelson Rich  Case Management: Behringer, Megan  Consultant: Azul Parada  Covering Team: Sharyn Barron  Covering Team: Nam Calhoun  ED ACP: Nate Almodovar  ED Attending: Nawaf Barrios  ED Nurse: Alona Fowler  Nurse: Myla Mart  Nurse: Dedrick Muñiz  Nurse: Madison Raygoza  Ordered: Doctor, Unknown  Ordered: ADM, User  Override: Julien Reynoso  PCA/Nursing Assistant: Isaias Estes  Physical Therapy: Balbir Thompson  Primary Team: Geremias Flanagan  Primary Team: Nelson Rich  Primary Team: Chemo Du  Primary Team: Nate Almodovar  Primary Team: Atul Linares  Registered Dietitian: Ebony Grigsby

## 2023-09-04 NOTE — CARE COORDINATION ASSESSMENT. - OTHER PERTINENT DISCHARGE PLANNING INFORMATION:
89 y/o f pmhx varicose veins, HTN, hypothyroid, colon cancer s/p resection 2002, osteoporosis, s/p tavr, presents w/ pain, erythema LLE. On Monday, patient had Varithena procedure for varicose veins. On friday, patient followed up with wound care who did not like the look of the surrounding area, sent back to vascular. Met patient/son Paco at bedside.  Explained role of CM, verbalized understanding. Pt was made aware a CM will remain available through hospitalization.  Contact information given in discharge/ transitions resource folder.

## 2023-09-04 NOTE — PROVIDER CONTACT NOTE (CRITICAL VALUE NOTIFICATION) - ASSESSMENT
Pt currently asymptomatic, no distress at this time.
No distress noted, pt currently on antibiotics
No

## 2023-09-04 NOTE — CONSULT NOTE ADULT - ASSESSMENT
89 y/o f pmhx varicose veins, HTN, hypothyroid, colon cancer s/p resection 2002, osteoporosis, s/p tavr, presents w/ pain, erythema LLE, admitted with cellulitis, confirmed DVT.    Sepsis secondary to LLE Cellulitis w/ MSSA Bacteremia  LLE DVT  - erythema, pain, swelling LLE surrounding area of injection site. Patient had Varithena procedure on Monday (foam injectable for varicose veins)  - s/p vanc x1, zosyn x1 in ED  - Blood cultures with MSSA  Recommendations  - Follow repeat blood cultures   - TTE  - can d/c zosyn and narrow to cefazolin for optimal MSSA coverage  - AC being held in setting of anemia, appreciate heme recs  - supportive care and additional management per primary team    Dr. Marcus to resume care 9/5  Infectious Diseases will continue to follow. Please call with any questions.   Jen Jansen M.D.  [facility] Division of Infectious Diseases [phone]    
Anemia  Acute DVT on a/c    Hgb noted  Improved s/p 1U pRBC overnight  Trend cbc  Transfuse prn  PPI for ppx  Follow up FOBT  Further recs pending above  D/w pt and     I reviewed the overnight course of events on the unit, re-confirming the patient history. I discussed the care with the patient and their family  Differential diagnosis and plan of care discussed with patient after the evaluation  40 minutes spent on total encounter of which more than fifty percent of the encounter was spent counseling and/or coordinating care by the attending physician.  Advanced care planning was discussed with patient and family.  Advanced care planning forms were reviewed and discussed.  Risks, benefits and alternatives of gastroenterologic procedures were discussed in detail and all questions were answered.

## 2023-09-05 LAB
-  AMPICILLIN/SULBACTAM: SIGNIFICANT CHANGE UP
-  CEFAZOLIN: SIGNIFICANT CHANGE UP
-  CLINDAMYCIN: SIGNIFICANT CHANGE UP
-  ERYTHROMYCIN: SIGNIFICANT CHANGE UP
-  GENTAMICIN: SIGNIFICANT CHANGE UP
-  OXACILLIN: SIGNIFICANT CHANGE UP
-  RIFAMPIN: SIGNIFICANT CHANGE UP
-  TETRACYCLINE: SIGNIFICANT CHANGE UP
-  TRIMETHOPRIM/SULFAMETHOXAZOLE: SIGNIFICANT CHANGE UP
-  VANCOMYCIN: SIGNIFICANT CHANGE UP
ALBUMIN SERPL ELPH-MCNC: 2 G/DL — LOW (ref 3.3–5)
ALP SERPL-CCNC: 272 U/L — HIGH (ref 40–120)
ALT FLD-CCNC: 100 U/L — HIGH (ref 12–78)
ANION GAP SERPL CALC-SCNC: 6 MMOL/L — SIGNIFICANT CHANGE UP (ref 5–17)
AST SERPL-CCNC: 54 U/L — HIGH (ref 15–37)
BASOPHILS # BLD AUTO: 0.05 K/UL — SIGNIFICANT CHANGE UP (ref 0–0.2)
BASOPHILS NFR BLD AUTO: 0.5 % — SIGNIFICANT CHANGE UP (ref 0–2)
BILIRUB SERPL-MCNC: 0.4 MG/DL — SIGNIFICANT CHANGE UP (ref 0.2–1.2)
BUN SERPL-MCNC: 28 MG/DL — HIGH (ref 7–23)
CALCIUM SERPL-MCNC: 7.4 MG/DL — LOW (ref 8.5–10.1)
CHLORIDE SERPL-SCNC: 112 MMOL/L — HIGH (ref 96–108)
CO2 SERPL-SCNC: 20 MMOL/L — LOW (ref 22–31)
CREAT SERPL-MCNC: 1.1 MG/DL — SIGNIFICANT CHANGE UP (ref 0.5–1.3)
CULTURE RESULTS: SIGNIFICANT CHANGE UP
EGFR: 47 ML/MIN/1.73M2 — LOW
EOSINOPHIL # BLD AUTO: 0.37 K/UL — SIGNIFICANT CHANGE UP (ref 0–0.5)
EOSINOPHIL NFR BLD AUTO: 3.5 % — SIGNIFICANT CHANGE UP (ref 0–6)
GLUCOSE SERPL-MCNC: 99 MG/DL — SIGNIFICANT CHANGE UP (ref 70–99)
GRAM STN FLD: SIGNIFICANT CHANGE UP
GRAM STN FLD: SIGNIFICANT CHANGE UP
HCT VFR BLD CALC: 30.7 % — LOW (ref 34.5–45)
HGB BLD-MCNC: 9.9 G/DL — LOW (ref 11.5–15.5)
IMM GRANULOCYTES NFR BLD AUTO: 1.2 % — HIGH (ref 0–0.9)
LYMPHOCYTES # BLD AUTO: 0.8 K/UL — LOW (ref 1–3.3)
LYMPHOCYTES # BLD AUTO: 7.6 % — LOW (ref 13–44)
MAGNESIUM SERPL-MCNC: 2.2 MG/DL — SIGNIFICANT CHANGE UP (ref 1.6–2.6)
MCHC RBC-ENTMCNC: 29.1 PG — SIGNIFICANT CHANGE UP (ref 27–34)
MCHC RBC-ENTMCNC: 32.2 GM/DL — SIGNIFICANT CHANGE UP (ref 32–36)
MCV RBC AUTO: 90.3 FL — SIGNIFICANT CHANGE UP (ref 80–100)
METHOD TYPE: SIGNIFICANT CHANGE UP
MONOCYTES # BLD AUTO: 1.27 K/UL — HIGH (ref 0–0.9)
MONOCYTES NFR BLD AUTO: 12.1 % — SIGNIFICANT CHANGE UP (ref 2–14)
NEUTROPHILS # BLD AUTO: 7.89 K/UL — HIGH (ref 1.8–7.4)
NEUTROPHILS NFR BLD AUTO: 75.1 % — SIGNIFICANT CHANGE UP (ref 43–77)
NRBC # BLD: 0 /100 WBCS — SIGNIFICANT CHANGE UP (ref 0–0)
ORGANISM # SPEC MICROSCOPIC CNT: SIGNIFICANT CHANGE UP
PLATELET # BLD AUTO: 108 K/UL — LOW (ref 150–400)
POTASSIUM SERPL-MCNC: 4.5 MMOL/L — SIGNIFICANT CHANGE UP (ref 3.5–5.3)
POTASSIUM SERPL-SCNC: 4.5 MMOL/L — SIGNIFICANT CHANGE UP (ref 3.5–5.3)
PROT SERPL-MCNC: 5.3 G/DL — LOW (ref 6–8.3)
RBC # BLD: 3.4 M/UL — LOW (ref 3.8–5.2)
RBC # FLD: 16.9 % — HIGH (ref 10.3–14.5)
SODIUM SERPL-SCNC: 138 MMOL/L — SIGNIFICANT CHANGE UP (ref 135–145)
SPECIMEN SOURCE: SIGNIFICANT CHANGE UP
WBC # BLD: 10.51 K/UL — HIGH (ref 3.8–10.5)
WBC # FLD AUTO: 10.51 K/UL — HIGH (ref 3.8–10.5)

## 2023-09-05 PROCEDURE — 99232 SBSQ HOSP IP/OBS MODERATE 35: CPT

## 2023-09-05 PROCEDURE — 93306 TTE W/DOPPLER COMPLETE: CPT | Mod: 26

## 2023-09-05 RX ORDER — LOSARTAN POTASSIUM 100 MG/1
100 TABLET, FILM COATED ORAL DAILY
Refills: 0 | Status: DISCONTINUED | OUTPATIENT
Start: 2023-09-05 | End: 2023-09-09

## 2023-09-05 RX ADMIN — LOSARTAN POTASSIUM 100 MILLIGRAM(S): 100 TABLET, FILM COATED ORAL at 17:27

## 2023-09-05 RX ADMIN — PANTOPRAZOLE SODIUM 40 MILLIGRAM(S): 20 TABLET, DELAYED RELEASE ORAL at 12:19

## 2023-09-05 RX ADMIN — Medication 100 MICROGRAM(S): at 06:14

## 2023-09-05 RX ADMIN — Medication 100 MILLIGRAM(S): at 22:48

## 2023-09-05 RX ADMIN — Medication 650 MILLIGRAM(S): at 17:27

## 2023-09-05 RX ADMIN — Medication 650 MILLIGRAM(S): at 06:14

## 2023-09-05 RX ADMIN — Medication 81 MILLIGRAM(S): at 12:19

## 2023-09-05 RX ADMIN — Medication 100 MILLIGRAM(S): at 06:15

## 2023-09-05 RX ADMIN — SODIUM CHLORIDE 75 MILLILITER(S): 9 INJECTION, SOLUTION INTRAVENOUS at 09:04

## 2023-09-05 NOTE — PROGRESS NOTE ADULT - SUBJECTIVE AND OBJECTIVE BOX
VASU CARBAJAL is a 89yFemale , patient examined and chart reviewed.    INTERVAL HPI/ OVERNIGHT EVENTS:   NAD. Afebrile.    PAST MEDICAL & SURGICAL HISTORY:  HTN (hypertension)  H/O osteoporosis  Hypothyroid  History of colon cancer  S/P TAVR (transcatheter aortic valve replacement)  S/P colon resection  S/P knee replacement  S/P cataract surgery      For details regarding the patient's social history, family history, and other miscellaneous elements, please refer the initial infectious diseases consultation and/or the admitting history and physical examination for this admission.    ROS:  CONSTITUTIONAL:  Negative fever or chills  EYES:  Negative  blurry vision or double vision  CARDIOVASCULAR:  Negative for chest pain or palpitations  RESPIRATORY:  Negative for cough, wheezing, or SOB   GASTROINTESTINAL:  Negative for nausea, vomiting, diarrhea, constipation, or abdominal pain  GENITOURINARY:  Negative frequency, urgency or dysuria  NEUROLOGIC:  No headache, confusion, dizziness, lightheadedness  All other systems were reviewed and are negative     ALLERGIES  morphine (Nausea)  No Known Allergies      Current inpatient medications :    ANTIBIOTICS/RELEVANT:  ceFAZolin   IVPB 2000 milliGRAM(s) IV Intermittent every 12 hours      acetaminophen     Tablet .. 650 milliGRAM(s) Oral every 6 hours PRN  aluminum hydroxide/magnesium hydroxide/simethicone Suspension 30 milliLiter(s) Oral every 4 hours PRN  aspirin  chewable 81 milliGRAM(s) Oral daily  levothyroxine 100 MICROGram(s) Oral daily  losartan 100 milliGRAM(s) Oral daily  melatonin 3 milliGRAM(s) Oral at bedtime PRN  ondansetron Injectable 4 milliGRAM(s) IV Push every 8 hours PRN  pantoprazole    Tablet 40 milliGRAM(s) Oral daily  sodium bicarbonate 650 milliGRAM(s) Oral two times a day      Objective:    09-04 @ 07:01  -  09-05 @ 07:00  --------------------------------------------------------  IN: 900 mL / OUT: 0 mL / NET: 900 mL      T(C): 36.8 (09-05-23 @ 13:27), Max: 36.8 (09-05-23 @ 04:53)  HR: 68 (09-05-23 @ 13:27) (68 - 80)  BP: 176/60 (09-05-23 @ 13:27) (155/58 - 176/60)  RR: 18 (09-05-23 @ 13:27) (18 - 18)  SpO2: 96% (09-05-23 @ 13:27) (92% - 96%)      Physical Exam:  General:  no acute distress  Neck: supple, trachea midline  Lungs: clear, no wheeze/rhonchi  Cardiovascular: regular rate and rhythm, S1 S2  Abdomen: soft, nontender,  bowel sounds normal  Neurological: alert and oriented x3  Skin: no rash  Extremities: Left LE erythema swelling      LABS:                       9.9    10.51 )-----------( 108      ( 05 Sep 2023 05:55 )             30.7       09-05    138  |  112<H>  |  28<H>  ----------------------------<  99  4.5   |  20<L>  |  1.10    Ca    7.4<L>      05 Sep 2023 05:55  Mg     2.2     09-05    TPro  5.3<L>  /  Alb  2.0<L>  /  TBili  0.4  /  DBili  x   /  AST  54<H>  /  ALT  100<H>  /  AlkPhos  272<H>  09-05      PTT - ( 04 Sep 2023 00:39 )  PTT:113.7 sec  Urinalysis Basic - ( 05 Sep 2023 05:55 )    Color: x / Appearance: x / SG: x / pH: x  Gluc: 99 mg/dL / Ketone: x  / Bili: x / Urobili: x   Blood: x / Protein: x / Nitrite: x   Leuk Esterase: x / RBC: x / WBC x   Sq Epi: x / Non Sq Epi: x / Bacteria: x    MICROBIOLOGY:    Culture - Urine (collected 04 Sep 2023 10:15)  Source: Clean Catch Clean Catch (Midstream)  Final Report (05 Sep 2023 16:39):    <10,000 CFU/mL Normal Urogenital Vikki    Culture - Blood (collected 04 Sep 2023 09:05)  Source: .Blood Blood  Preliminary Report (05 Sep 2023 15:01):    No growth at 24 hours    Culture - Blood (collected 04 Sep 2023 09:00)  Source: .Blood Blood  Gram Stain (05 Sep 2023 16:44):    Growth in anaerobic bottle: Gram Positive Cocci in Clusters    Growth in aerobic bottle: Gram Positive Cocci in Clusters  Preliminary Report (05 Sep 2023 16:44):    Growth in anaerobic bottle: Gram Positive Cocci in Clusters    Growth in aerobic bottle: Gram Positive Cocci in Clusters    Culture - Blood (collected 03 Sep 2023 15:25)  Source: .Blood Blood-Peripheral  Gram Stain (04 Sep 2023 07:25):    Growth in aerobic and anaerobic bottles: Gram Positive Cocci in Clusters  Final Report (05 Sep 2023 15:38):    Growth in aerobic and anaerobic bottles: Staphylococcus aureus    See previous culture [id number]    Culture - Blood (collected 03 Sep 2023 15:15)  Source: .Blood Blood-Peripheral  Gram Stain (04 Sep 2023 08:01):    Growth in aerobic bottle: Gram Positive Cocci in Clusters    Growth in anaerobic bottle: Gram Positive Cocci in Clusters  Final Report (05 Sep 2023 15:37):    Growth in aerobic and anaerobic bottles: Staphylococcus aureus    Direct identification is available within approximately 3-5    hours either by Blood Panel Multiplexed PCR or Direct    MALDI-TOF. Details: [email or web address]  Organism: Blood Culture PCR  Staphylococcus aureus (05 Sep 2023 15:37)  Organism: Staphylococcus aureus (05 Sep 2023 15:37)      Method Type: RAMOS      -  Ampicillin/Sulbactam: S <=8/4      -  Cefazolin: S <=4      -  Clindamycin: S <=0.25      -  Erythromycin: S <=0.25      -  Gentamicin: S <=1 Should not be used as monotherapy      -  Oxacillin: S <=0.25 Oxacillin predicts susceptibility for dicloxacillin, methicillin, and nafcillin      -  Rifampin: S <=1 Should not be used as monotherapy      -  Tetracycline: S <=1      -  Trimethoprim/Sulfamethoxazole: S <=0.5/9.5      -  Vancomycin: S 2  Organism: Blood Culture PCR (05 Sep 2023 15:37)      Method Type: PCR      -  Methicillin SENSITIVE Staphylococcus aureus (MSSA): Detec Any isolate of Staphylococcus aureus from a blood culture is NOT considered a contaminant.      RADIOLOGY & ADDITIONAL STUDIES:  ACC: [id number] EXAM:  US ABDOMEN RT UPR QUADRANT   ORDERED BY: BARAK HAWKINS     PROCEDURE DATE:  09/04/2023          INTERPRETATION:  CLINICAL INFORMATION: Abnormal liver function tests.    COMPARISON: None available.    TECHNIQUE: Sonography of the right upper quadrant.    FINDINGS:  Liver: Normal in size and echogenicity. No focal lesions identified.  Bile ducts: Normal caliber. Common bile duct measures 5 mm.  Gallbladder: Within normal limits. No stones or gallbladder wall   thickening. No sonographic Selby's sign.  Pancreas: Poorly visualized.  Right kidney: 6.5 cm. No hydronephrosis. Atrophic.  Ascites: None.  IVC: Visualized portions are within normal limits. Diffuse   atherosclerotic calcifications of the abdominal aorta.    IMPRESSION:  The liver is normal in size and echogenicity without focal lesions.    Atrophic right kidney.    ACC: [id number] EXAM:  US DPLX LWR EXT VEINS LTD LT   ORDERED BY: BEV RHODES     PROCEDURE DATE:  09/03/2023          INTERPRETATION:  CLINICAL INFORMATION: Redness and swelling of the lower   extremity    COMPARISON: None available.    TECHNIQUE:Duplex sonography of the LEFT LOWER extremity veins with color   and spectral Doppler, with and without compression.    FINDINGS:    There is normal compressibility of the left common femoral, femoral and   popliteal veins. The contralateral common femoral vein is patent.  Doppler examination shows normal spontaneous and phasic flow of the veins   above the knee.    However, thrombosis is seen of the left posterior tibial vein in the mid   calf where there is a perforating vein connected to the greater saphenous   vein and extends down into the posterior tibial vein.      IMPRESSION:  Acute deep venous thrombosis: below the knee.      Assessment :  89 y/o f pmhx varicose veins, HTN, hypothyroid, colon cancer s/p resection 2002, osteoporosis, s/p tavr, presents w/ pain, erythema LLE, admitted with cellulitis, confirmed DVT.  Sepsis secondary to LLE Cellulitis w/ MSSA Bacteremia  LLE DVT  - erythema, pain, swelling LLE surrounding area of injection site. Patient had recent Varithena procedure (foam injectable for varicose veins)  - Blood cultures with MSSA  - Repeat blood cultures 9/4 still +    Plan:  Cont Ancef  Rrepeat blood cultures   TTE  Supportive care and additional management per primary team      Continue with present regiment.  Appropriate use of antibiotics and adverse effects reviewed.      I have discussed the above plan of care with patient in detail. She expressed understanding of the  treatment plan . Risks, benefits and alternatives discussed in detail. I have asked if she has any questions or concerns and appropriately addressed them to the best of my ability .    > 35 minutes were spent in direct patient care reviewing notes, medications ,labs data/ imaging , discussion with multidisciplinary team.    Thank you for allowing me to participate in care of your patient .    Timothy Marcus MD  Infectious Disease  [phone]

## 2023-09-05 NOTE — PROVIDER CONTACT NOTE (OTHER) - SITUATION
Pt's not on hypertension medication and bps are trending up  today sbp 170's denies headache or nausea

## 2023-09-05 NOTE — PROGRESS NOTE ADULT - ASSESSMENT
Anemia  Acute DVT    Hgb noted, stable  Trend cbc  Transfuse prn  PPI for ppx  FOBT negative  Risks of holding a/c outweigh benefits; monitor closely for GIB on a/c  D/w pt and granddaughter    I reviewed the overnight course of events on the unit, re-confirming the patient history. I discussed the care with the patient and their family  Differential diagnosis and plan of care discussed with patient after the evaluation  40 minutes spent on total encounter of which more than fifty percent of the encounter was spent counseling and/or coordinating care by the attending physician.  Advanced care planning was discussed with patient and family.  Advanced care planning forms were reviewed and discussed.  Risks, benefits and alternatives of gastroenterologic procedures were discussed in detail and all questions were answered.

## 2023-09-05 NOTE — PROGRESS NOTE ADULT - SUBJECTIVE AND OBJECTIVE BOX
[facility]  Cuco Parada PA-C  [address] [place] [address]  [phone]      INTERVAL HPI/OVERNIGHT EVENTS:  Pt s/e with granddaughter at bedside  FOBT negative  No overt GI bleeding    MEDICATIONS  (STANDING):  aspirin  chewable 81 milliGRAM(s) Oral daily  ceFAZolin   IVPB 2000 milliGRAM(s) IV Intermittent every 12 hours  influenza  Vaccine (HIGH DOSE) 0.7 milliLiter(s) IntraMuscular once  levothyroxine 100 MICROGram(s) Oral daily  pantoprazole    Tablet 40 milliGRAM(s) Oral daily  sodium bicarbonate 650 milliGRAM(s) Oral two times a day    MEDICATIONS  (PRN):  acetaminophen     Tablet .. 650 milliGRAM(s) Oral every 6 hours PRN Temp greater or equal to 38C (100.4F), Mild Pain (1 - 3)  aluminum hydroxide/magnesium hydroxide/simethicone Suspension 30 milliLiter(s) Oral every 4 hours PRN Dyspepsia  melatonin 3 milliGRAM(s) Oral at bedtime PRN Insomnia  ondansetron Injectable 4 milliGRAM(s) IV Push every 8 hours PRN Nausea and/or Vomiting      Allergies    No Known Allergies    Intolerances    morphine (Nausea)      PHYSICAL EXAM:   Vital Signs:  Vital Signs Last 24 Hrs  T(C): 36.8 (05 Sep 2023 04:53), Max: 36.8 (04 Sep 2023 14:33)  T(F): 98.2 (05 Sep 2023 04:53), Max: 98.3 (04 Sep 2023 14:33)  HR: 80 (05 Sep 2023 04:53) (70 - 80)  BP: 163/62 (05 Sep 2023 04:53) (128/54 - 163/62)  BP(mean): --  RR: 18 (05 Sep 2023 04:53) (17 - 18)  SpO2: 92% (05 Sep 2023 04:53) (92% - 96%)    Parameters below as of 05 Sep 2023 04:53  Patient On (Oxygen Delivery Method): room air      Daily     Daily Weight in k.4 (05 Sep 2023 04:53)    GENERAL:  Appears stated age  HEENT:  NC/AT  CHEST:  Full & symmetric excursion  HEART:  Regular rhythm  ABDOMEN:  Soft, non-tender, non-distended  EXTEREMITIES:  no cyanosis  SKIN:  No rash  NEURO:  Alert      LABS:                        9.9    10.51 )-----------( 108      ( 05 Sep 2023 05:55 )             30.7         138  |  112<H>  |  28<H>  ----------------------------<  99  4.5   |  20<L>  |  1.10    Ca    7.4<L>      05 Sep 2023 05:55  Mg     2.2         TPro  5.3<L>  /  Alb  2.0<L>  /  TBili  0.4  /  DBili  x   /  AST  54<H>  /  ALT  100<H>  /  AlkPhos  272<H>  09    PT/INR - ( 03 Sep 2023 15:15 )   PT: 11.2 sec;   INR: 0.96 ratio         PTT - ( 04 Sep 2023 00:39 )  PTT:113.7 sec  Urinalysis Basic - ( 05 Sep 2023 05:55 )    Color: x / Appearance: x / SG: x / pH: x  Gluc: 99 mg/dL / Ketone: x  / Bili: x / Urobili: x   Blood: x / Protein: x / Nitrite: x   Leuk Esterase: x / RBC: x / WBC x   Sq Epi: x / Non Sq Epi: x / Bacteria: x

## 2023-09-05 NOTE — PROVIDER CONTACT NOTE (CRITICAL VALUE NOTIFICATION) - ACTION/TREATMENT ORDERED:
Dr Du aware. repeating BCs and antibiotics given. repeat CBC and OS to be ordered. will continue to assess and monitor.
MD made notified, will notify with further interventions
MD notified, no interventions at this time.
Dr. bang aware. patient on cefazolin no changes to be made. will continue to assess and monitor.

## 2023-09-05 NOTE — PHYSICAL THERAPY INITIAL EVALUATION ADULT - GAIT TRAINING, PT EVAL
STG (3-5 sessions) Amb 150 feet without device vs least restrictive device safe for pt, supervision/independent

## 2023-09-05 NOTE — PROVIDER CONTACT NOTE (CRITICAL VALUE NOTIFICATION) - TEST AND RESULT REPORTED:
Hgb 6.0, Hematocrit 17.7
Positive Aerobic & Gram Positive Cocci in clusters
9/3 BC+ preliminary growth in aerobic and anaerobic bottle gram + cocci in clusters. The direct ID shows will be available in 3-5hrs PCR
9/4/23 +BC preliminary growth in anaerobic bottle gram + cocci in clusters

## 2023-09-05 NOTE — PROVIDER CONTACT NOTE (CRITICAL VALUE NOTIFICATION) - SITUATION
9/4/23 +BC preliminary growth in anaerobic bottle gram + cocci in clusters
9/3 BC+ preliminary growth in aerobic and anaerobic bottle gram + cocci in clusters. The direct ID shows will be available in 3-5hrs PCR

## 2023-09-05 NOTE — PROGRESS NOTE ADULT - SUBJECTIVE AND OBJECTIVE BOX
Patient is a 89y old  Female who presents with a chief complaint of DVT / cellulitis (05 Sep 2023 11:50)      INTERVAL HPI/OVERNIGHT EVENTS: Patient seen and examined at bedside. No overnight events.  Reports improvement in LLE erythema, Swelling persists      MEDICATIONS  (STANDING):  aspirin  chewable 81 milliGRAM(s) Oral daily  ceFAZolin   IVPB 2000 milliGRAM(s) IV Intermittent every 12 hours  influenza  Vaccine (HIGH DOSE) 0.7 milliLiter(s) IntraMuscular once  levothyroxine 100 MICROGram(s) Oral daily  pantoprazole    Tablet 40 milliGRAM(s) Oral daily  sodium bicarbonate 650 milliGRAM(s) Oral two times a day    MEDICATIONS  (PRN):  acetaminophen     Tablet .. 650 milliGRAM(s) Oral every 6 hours PRN Temp greater or equal to 38C (100.4F), Mild Pain (1 - 3)  aluminum hydroxide/magnesium hydroxide/simethicone Suspension 30 milliLiter(s) Oral every 4 hours PRN Dyspepsia  melatonin 3 milliGRAM(s) Oral at bedtime PRN Insomnia  ondansetron Injectable 4 milliGRAM(s) IV Push every 8 hours PRN Nausea and/or Vomiting      Allergies    No Known Allergies    Intolerances    morphine (Nausea)      REVIEW OF SYSTEMS:  CONSTITUTIONAL: No fever or chills  CARDIOVASCULAR: No chest pain, palpitations  GASTROINTESTINAL: No abd pain, nausea, vomiting, or diarrhea      Vital Signs Last 24 Hrs  T(C): 36.8 (05 Sep 2023 04:53), Max: 36.8 (04 Sep 2023 14:33)  T(F): 98.2 (05 Sep 2023 04:53), Max: 98.3 (04 Sep 2023 14:33)  HR: 80 (05 Sep 2023 04:53) (70 - 80)  BP: 163/62 (05 Sep 2023 04:53) (128/54 - 163/62)  BP(mean): --  RR: 18 (05 Sep 2023 04:53) (17 - 18)  SpO2: 92% (05 Sep 2023 04:53) (92% - 96%)    Parameters below as of 05 Sep 2023 04:53  Patient On (Oxygen Delivery Method): room air      I&O's Summary    04 Sep 2023 07:01  -  05 Sep 2023 07:00  --------------------------------------------------------  IN: 900 mL / OUT: 0 mL / NET: 900 mL      BMI (kg/m2): 24.7 (09-03-23 @ 14:43)    PHYSICAL EXAM:  GENERAL: NAD  HEENT:  AT/NC, anicteric, moist mucous membranes, EOMI, PERRL, no lid-lag, conjunctiva and sclera clear  CHEST/LUNG:  CTA b/l, no rales, wheezes, or rhonchi,  normal respiratory effort, no intercostal retractions  HEART:  RRR, S1, S2, no murmurs; no pitting edema  ABDOMEN:  BS+, soft, nontender, nondistended  MSK/EXTREMITIES: palpable peripheral pulses, no clubbing or cyanosis, L inner thigh with swelling and mild erythema improving, venous stasis changes bilateral lower extremities   NERVOUS SYSTEM: answers questions and follows commands appropriately, A&Ox3 grossly moves all extremities   PSYCH: Appropriate affect, Alert & Awake; Good judgement      LABS: Personally reviewed  CBC                        9.9    10.51 )-----------( 108      ( 05 Sep 2023 05:55 )             30.7     CMP  09-05    138  |  112  |  28  ----------------------------<  99  4.5   |  20  |  1.10    Ca    7.4      05 Sep 2023 05:55  Mg     2.2     09-05    TPro  5.3  /  Alb  2.0  /  TBili  0.4  /  DBili  x   /  AST  54  /  ALT  100  /  AlkPhos  272  09-05          PT/INR - ( 03 Sep 2023 15:15 )   PT: 11.2 sec;   INR: 0.96 ratio         PTT - ( 04 Sep 2023 00:39 )  PTT:113.7 sec  Lactate, Blood: 1.9 mmol/L (09-03 @ 15:15)            TSH 0.99   TSH with FT4 reflex --  Total T3 --                  Urinalysis Basic - ( 05 Sep 2023 05:55 )    Color: x / Appearance: x / SG: x / pH: x  Gluc: 99 mg/dL / Ketone: x  / Bili: x / Urobili: x   Blood: x / Protein: x / Nitrite: x   Leuk Esterase: x / RBC: x / WBC x   Sq Epi: x / Non Sq Epi: x / Bacteria: x        Culture - Blood (collected 03 Sep 2023 15:25)  Source: .Blood Blood-Peripheral  Gram Stain (04 Sep 2023 07:25):    Growth in aerobic and anaerobic bottles: Gram Positive Cocci in Clusters  Preliminary Report (04 Sep 2023 23:09):    Growth in aerobic and anaerobic bottles: Staphylococcus aureus    See previous culture [id number]    Culture - Blood (collected 03 Sep 2023 15:15)  Source: .Blood Blood-Peripheral  Gram Stain (04 Sep 2023 08:01):    Growth in aerobic bottle: Gram Positive Cocci in Clusters    Growth in anaerobic bottle: Gram Positive Cocci in Clusters  Preliminary Report (04 Sep 2023 23:10):    Growth in aerobic and anaerobic bottles: Staphylococcus aureus    Direct identification is available within approximately 3-5    hours either by Blood Panel Multiplexed PCR or Direct    MALDI-TOF. Details: [email or web address]  Organism: Blood Culture PCR (04 Sep 2023 07:13)  Organism: Blood Culture PCR (04 Sep 2023 07:13)      Method Type: PCR      -  Methicillin SENSITIVE Staphylococcus aureus (MSSA): Detec Any isolate of Staphylococcus aureus from a blood culture is NOT considered a contaminant.            Culture - Blood (collected 09-03-23 @ 15:25)  Source: .Blood Blood-Peripheral  Gram Stain (09-04-23 @ 07:25):    Growth in aerobic and anaerobic bottles: Gram Positive Cocci in Clusters  Preliminary Report (09-04-23 @ 23:09):    Growth in aerobic and anaerobic bottles: Staphylococcus aureus    See previous culture [id number]    Culture - Blood (collected 09-03-23 @ 15:15)  Source: .Blood Blood-Peripheral  Gram Stain (09-04-23 @ 08:01):    Growth in aerobic bottle: Gram Positive Cocci in Clusters    Growth in anaerobic bottle: Gram Positive Cocci in Clusters  Preliminary Report (09-04-23 @ 23:10):    Growth in aerobic and anaerobic bottles: Staphylococcus aureus    Direct identification is available within approximately 3-5    hours either by Blood Panel Multiplexed PCR or Direct    MALDI-TOF. Details: [email or web address]  Organism: Blood Culture PCR (09-04-23 @ 07:13)  Organism: Blood Culture PCR (09-04-23 @ 07:13)      Method Type: PCR      -  Methicillin SENSITIVE Staphylococcus aureus (MSSA): Detec Any isolate of Staphylococcus aureus from a blood culture is NOT considered a contaminant.        RADIOLOGY & ADDITIONAL TESTS: Personally reviewed.     Consultant(s) Notes Reviewed:  [x] YES  [ ] NO   Discussed with CARA/NOEMI, RN

## 2023-09-05 NOTE — PROGRESS NOTE ADULT - ASSESSMENT
89 y/o f pmhx varicose veins, HTN, hypothyroid, colon cancer s/p resection 2002, osteoporosis, s/p tavr, presents w/ pain, erythema LLE, admitted with cellulitis, confirmed DVT.    1) Sepsis secondary to LLE Cellulitis.   - erythema, pain, swelling LLE surrounding area of injection site. Patient had Varithena procedure on Monday (foam injectable for varicose veins)  - Blood cultures with MSSA. Bacteremic   -Continue Ancef   -ID following recommendations appreciated  - Follow repeat blood cultures   - ECHO to rule out endocarditis   - s/p NS bolus in ED, continue IVF    2) LLE DVT   US LLE: thrombosis is seen of the left posterior tibial vein in the mid calf where there is a perforating vein connected to the greater saphenous vein and extends down into the posterior tibial vein.  - This appears to be a provoked DVT in the setting of Varithena  -Stop heparin gtt due to anemia  s/p pRBC  H/H stable  -Check lower extremity doppler in AM   -Below knee DVT risk for AC may outweigh benefits    3) Anemia  - FOBT negative   - s/p 1 PRBC  - Monitor H&H  - GI Consulted given history of colon cancer, recs appreciated     4) Renal Insufficiency   - Unknown baseline     - presents w/ Cr 1.6  - Continue IVF, Cr improving 1.2  - Avoid nephrotoxic medications  - Monitor renal indices.    5) Hyponatremia.   Suspect hypovolemic hyponatremia 2/2 decreased PO intake  - Improved with IVF  - Encourage for oral intake     6) Transaminitis  - RUQ US performed,  - trend enzymes.    7) HTN (hypertension).   on irbesartan, hold in setting of renal insufficiency   - Monitor BP, currently stable     8) Hypothyroid.   - continue home synthroid.    9) S/P TAVR (transcatheter aortic valve replacement).   - continue home asa.  - TTE to r/o Endocarditis     DVT Prophylaxis -- Venodyne. Holding Heparin Infusion due to anemia.       Discussed with family at bedside, aware and in agreement with above

## 2023-09-05 NOTE — PHYSICAL THERAPY INITIAL EVALUATION ADULT - ADDITIONAL COMMENTS
Pt lives alone in a house, + few steps to enter/exit only. Pt is an independent ambulator without device and independent with ADLs. + driving. Pt stating she has a cane at home to use if needed

## 2023-09-05 NOTE — PHYSICAL THERAPY INITIAL EVALUATION ADULT - PERTINENT HX OF CURRENT PROBLEM, REHAB EVAL
89 y/o female adm 9/3 pmhx varicose veins, HTN, hypothyroid, colon cancer s/p resection 2002, osteoporosis, s/p tavr, presents w/ pain, erythema LLE, admitted with cellulitis, confirmed DVT LLE 9/3

## 2023-09-06 ENCOUNTER — APPOINTMENT (OUTPATIENT)
Dept: VASCULAR SURGERY | Facility: CLINIC | Age: 88
End: 2023-09-06

## 2023-09-06 LAB
ANION GAP SERPL CALC-SCNC: 6 MMOL/L — SIGNIFICANT CHANGE UP (ref 5–17)
BUN SERPL-MCNC: 26 MG/DL — HIGH (ref 7–23)
CALCIUM SERPL-MCNC: 8.3 MG/DL — LOW (ref 8.5–10.1)
CHLORIDE SERPL-SCNC: 110 MMOL/L — HIGH (ref 96–108)
CO2 SERPL-SCNC: 21 MMOL/L — LOW (ref 22–31)
CREAT SERPL-MCNC: 1.1 MG/DL — SIGNIFICANT CHANGE UP (ref 0.5–1.3)
CULTURE RESULTS: SIGNIFICANT CHANGE UP
EGFR: 47 ML/MIN/1.73M2 — LOW
GLUCOSE SERPL-MCNC: 105 MG/DL — HIGH (ref 70–99)
HCT VFR BLD CALC: 29 % — LOW (ref 34.5–45)
HGB BLD-MCNC: 9.8 G/DL — LOW (ref 11.5–15.5)
MCHC RBC-ENTMCNC: 29.5 PG — SIGNIFICANT CHANGE UP (ref 27–34)
MCHC RBC-ENTMCNC: 33.8 GM/DL — SIGNIFICANT CHANGE UP (ref 32–36)
MCV RBC AUTO: 87.3 FL — SIGNIFICANT CHANGE UP (ref 80–100)
NRBC # BLD: 0 /100 WBCS — SIGNIFICANT CHANGE UP (ref 0–0)
PLATELET # BLD AUTO: 164 K/UL — SIGNIFICANT CHANGE UP (ref 150–400)
POTASSIUM SERPL-MCNC: 4.9 MMOL/L — SIGNIFICANT CHANGE UP (ref 3.5–5.3)
POTASSIUM SERPL-SCNC: 4.9 MMOL/L — SIGNIFICANT CHANGE UP (ref 3.5–5.3)
RBC # BLD: 3.32 M/UL — LOW (ref 3.8–5.2)
RBC # FLD: 16.4 % — HIGH (ref 10.3–14.5)
SODIUM SERPL-SCNC: 137 MMOL/L — SIGNIFICANT CHANGE UP (ref 135–145)
SPECIMEN SOURCE: SIGNIFICANT CHANGE UP
WBC # BLD: 14 K/UL — HIGH (ref 3.8–10.5)
WBC # FLD AUTO: 14 K/UL — HIGH (ref 3.8–10.5)

## 2023-09-06 PROCEDURE — 93971 EXTREMITY STUDY: CPT | Mod: 26,LT

## 2023-09-06 PROCEDURE — 99233 SBSQ HOSP IP/OBS HIGH 50: CPT | Mod: GC

## 2023-09-06 RX ORDER — ENOXAPARIN SODIUM 100 MG/ML
60 INJECTION SUBCUTANEOUS EVERY 12 HOURS
Refills: 0 | Status: DISCONTINUED | OUTPATIENT
Start: 2023-09-06 | End: 2023-09-09

## 2023-09-06 RX ORDER — CEFAZOLIN SODIUM 1 G
1000 VIAL (EA) INJECTION EVERY 12 HOURS
Refills: 0 | Status: DISCONTINUED | OUTPATIENT
Start: 2023-09-07 | End: 2023-09-09

## 2023-09-06 RX ADMIN — ENOXAPARIN SODIUM 60 MILLIGRAM(S): 100 INJECTION SUBCUTANEOUS at 18:15

## 2023-09-06 RX ADMIN — PANTOPRAZOLE SODIUM 40 MILLIGRAM(S): 20 TABLET, DELAYED RELEASE ORAL at 13:19

## 2023-09-06 RX ADMIN — Medication 100 MICROGRAM(S): at 05:40

## 2023-09-06 RX ADMIN — Medication 650 MILLIGRAM(S): at 10:53

## 2023-09-06 RX ADMIN — Medication 650 MILLIGRAM(S): at 18:15

## 2023-09-06 RX ADMIN — Medication 650 MILLIGRAM(S): at 11:30

## 2023-09-06 RX ADMIN — Medication 100 MILLIGRAM(S): at 05:40

## 2023-09-06 RX ADMIN — Medication 81 MILLIGRAM(S): at 13:19

## 2023-09-06 RX ADMIN — LOSARTAN POTASSIUM 100 MILLIGRAM(S): 100 TABLET, FILM COATED ORAL at 05:40

## 2023-09-06 RX ADMIN — Medication 650 MILLIGRAM(S): at 05:40

## 2023-09-06 NOTE — PROGRESS NOTE ADULT - SUBJECTIVE AND OBJECTIVE BOX
Patient is a 89y old  Female who presents with a chief complaint of DVT / cellulitis (06 Sep 2023 12:01)    pt seen and examine today alert awake , no fever ,  no blood per rectum ,  tolerating po .   INTERVAL HPI/OVERNIGHT EVENTS:     T(C): 36.3 (09-06-23 @ 14:16), Max: 37 (09-06-23 @ 05:07)  HR: 77 (09-06-23 @ 14:16) (67 - 77)  BP: 127/61 (09-06-23 @ 14:16) (127/61 - 155/64)  RR: 17 (09-06-23 @ 14:16) (17 - 18)  SpO2: 95% (09-06-23 @ 14:16) (91% - 95%)  Wt(kg): --  I&O's Summary      REVIEW OF SYSTEMS:  CONSTITUTIONAL: No fever, weight loss, or fatigue  EYES: No eye pain, visual disturbances, or discharge  ENMT:  ; No sinus or throat pain  NECK: No pain or stiffness  BREASTS: No pain, no masses  RESPIRATORY: No cough, wheezing, chills   No shortness of breath  CARDIOVASCULAR: No chest pain, palpitations, dizziness, or leg swelling  GASTROINTESTINAL: No abdominal or epigastric pain. No nausea, vomiting, ,  No diarrhea or constipation.   GENITOURINARY: No dysuria, frequency, hematuria, or incontinence  NEUROLOGICAL: No headaches, memory loss, loss of strength, numbness,   SKIN: No itching, burning, rashes, or lesions   MUSCULOSKELETAL: No joint pain or swelling; No muscle, back, or extremity pain    PHYSICAL EXAM:  GENERAL: NAD,   HEAD:  Atraumatic, Normocephalic  EYES: EOMI, PERRLA, conjunctiva and sclera clear  ENMT:   Moist mucous membranes  NECK: Supple, No JVD  NERVOUS SYSTEM:  Alert & Oriented X3; Motor Strength 5/5 B/L upper and lower extremities; DTRs 2+ intact and symmetric  CHEST/LUNG: percussion bilaterally; No rales, rhonchi, wheezing,   HEART: Regular rate and rhythm; No murmurs, no tachy   ABDOMEN: Soft, Nontender, Nondistended; Bowel sounds present  EXTREMITIES:  2+ Peripheral Pulses, No clubbing, cyanosis, or edema  SKIN: No rashes or lesions    MEDICATIONS  (STANDING):  aspirin  chewable 81 milliGRAM(s) Oral daily  influenza  Vaccine (HIGH DOSE) 0.7 milliLiter(s) IntraMuscular once  levothyroxine 100 MICROGram(s) Oral daily  losartan 100 milliGRAM(s) Oral daily  pantoprazole    Tablet 40 milliGRAM(s) Oral daily  sodium bicarbonate 650 milliGRAM(s) Oral two times a day    MEDICATIONS  (PRN):  acetaminophen     Tablet .. 650 milliGRAM(s) Oral every 6 hours PRN Temp greater or equal to 38C (100.4F), Mild Pain (1 - 3)  aluminum hydroxide/magnesium hydroxide/simethicone Suspension 30 milliLiter(s) Oral every 4 hours PRN Dyspepsia  melatonin 3 milliGRAM(s) Oral at bedtime PRN Insomnia  ondansetron Injectable 4 milliGRAM(s) IV Push every 8 hours PRN Nausea and/or Vomiting      LABS:                        9.8    14.00 )-----------( 164      ( 06 Sep 2023 06:03 )             29.0     09-06    137  |  110<H>  |  26<H>  ----------------------------<  105<H>  4.9   |  21<L>  |  1.10    Ca    8.3<L>      06 Sep 2023 06:03  Mg     2.2     09-05    TPro  5.3<L>  /  Alb  2.0<L>  /  TBili  0.4  /  DBili  x   /  AST  54<H>  /  ALT  100<H>  /  AlkPhos  272<H>  09-05      Urinalysis Basic - ( 06 Sep 2023 06:03 )    Color: x / Appearance: x / SG: x / pH: x  Gluc: 105 mg/dL / Ketone: x  / Bili: x / Urobili: x   Blood: x / Protein: x / Nitrite: x   Leuk Esterase: x / RBC: x / WBC x   Sq Epi: x / Non Sq Epi: x / Bacteria: x      CAPILLARY BLOOD GLUCOSE          09-05 @ 05:59   No growth at 24 hours  --  --  09-05 @ 05:55   No growth at 24 hours  --  --  09-04 @ 10:15   <10,000 CFU/mL Normal Urogenital Vikki  --  --  09-04 @ 09:05   No growth at 24 hours  --  --  09-04 @ 09:00   Growth in aerobic and anaerobic bottles: Staphylococcus aureus  See previous culture [id number]  --  --  09-03 @ 15:25   Growth in aerobic and anaerobic bottles: Staphylococcus aureus  See previous culture [id number]  --  --  09-03 @ 15:15   Growth in aerobic and anaerobic bottles: Staphylococcus aureus  Direct identification is available within approximately 3-5  hours either by Blood Panel Multiplexed PCR or Direct  MALDI-TOF. Details: [email or web address]  --  Blood Culture PCR          RADIOLOGY & ADDITIONAL TESTS:    Imaging Personally Reviewed:     no new test   Advance Directives:  full code    Palliative Care:  Appropriate

## 2023-09-06 NOTE — PROGRESS NOTE ADULT - ASSESSMENT
Anemia  Acute DVT    Hgb noted, stable  Trend cbc  Transfuse prn  PPI for ppx  FOBT negative  Risks of holding a/c outweigh benefits; if a/c required then monitor closely for GIB  D/w pt    I reviewed the overnight course of events on the unit, re-confirming the patient history. I discussed the care with the patient and their family  Differential diagnosis and plan of care discussed with patient after the evaluation  40 minutes spent on total encounter of which more than fifty percent of the encounter was spent counseling and/or coordinating care by the attending physician.  Advanced care planning was discussed with patient and family.  Advanced care planning forms were reviewed and discussed.  Risks, benefits and alternatives of gastroenterologic procedures were discussed in detail and all questions were answered.

## 2023-09-06 NOTE — PROGRESS NOTE ADULT - SUBJECTIVE AND OBJECTIVE BOX
[facility]  Cuco Parada PA-C  [address] [place] [address]  [phone]      INTERVAL HPI/OVERNIGHT EVENTS:  Pt s/e  No overt GI bleeding    MEDICATIONS  (STANDING):  aspirin  chewable 81 milliGRAM(s) Oral daily  ceFAZolin   IVPB 2000 milliGRAM(s) IV Intermittent every 12 hours  influenza  Vaccine (HIGH DOSE) 0.7 milliLiter(s) IntraMuscular once  levothyroxine 100 MICROGram(s) Oral daily  losartan 100 milliGRAM(s) Oral daily  pantoprazole    Tablet 40 milliGRAM(s) Oral daily  sodium bicarbonate 650 milliGRAM(s) Oral two times a day    MEDICATIONS  (PRN):  acetaminophen     Tablet .. 650 milliGRAM(s) Oral every 6 hours PRN Temp greater or equal to 38C (100.4F), Mild Pain (1 - 3)  aluminum hydroxide/magnesium hydroxide/simethicone Suspension 30 milliLiter(s) Oral every 4 hours PRN Dyspepsia  melatonin 3 milliGRAM(s) Oral at bedtime PRN Insomnia  ondansetron Injectable 4 milliGRAM(s) IV Push every 8 hours PRN Nausea and/or Vomiting      Allergies    No Known Allergies    Intolerances    morphine (Nausea)      PHYSICAL EXAM:   Vital Signs:  Vital Signs Last 24 Hrs  T(C): 37 (06 Sep 2023 05:07), Max: 37 (06 Sep 2023 05:07)  T(F): 98.6 (06 Sep 2023 05:07), Max: 98.6 (06 Sep 2023 05:07)  HR: 74 (06 Sep 2023 05:07) (67 - 74)  BP: 155/64 (06 Sep 2023 05:07) (151/61 - 176/60)  BP(mean): --  RR: 18 (06 Sep 2023 05:07) (18 - 18)  SpO2: 91% (06 Sep 2023 05:07) (91% - 96%)    Parameters below as of 06 Sep 2023 05:07  Patient On (Oxygen Delivery Method): room air      Daily     Daily Weight in k.3 (06 Sep 2023 05:07)    GENERAL:  Appears stated age  HEENT:  NC/AT  CHEST:  Full & symmetric excursion  HEART:  Regular rhythm  ABDOMEN:  Soft, non-tender, non-distended  EXTEREMITIES:  no cyanosis  SKIN:  No rash  NEURO:  Alert      LABS:                        9.8    14.00 )-----------( 164      ( 06 Sep 2023 06:03 )             29.0     09-06    137  |  110<H>  |  26<H>  ----------------------------<  105<H>  4.9   |  21<L>  |  1.10    Ca    8.3<L>      06 Sep 2023 06:03  Mg     2.2     09-05    TPro  5.3<L>  /  Alb  2.0<L>  /  TBili  0.4  /  DBili  x   /  AST  54<H>  /  ALT  100<H>  /  AlkPhos  272<H>  09-05      Urinalysis Basic - ( 06 Sep 2023 06:03 )    Color: x / Appearance: x / SG: x / pH: x  Gluc: 105 mg/dL / Ketone: x  / Bili: x / Urobili: x   Blood: x / Protein: x / Nitrite: x   Leuk Esterase: x / RBC: x / WBC x   Sq Epi: x / Non Sq Epi: x / Bacteria: x

## 2023-09-06 NOTE — PROGRESS NOTE ADULT - ASSESSMENT
89 y/o f pmhx varicose veins, HTN, hypothyroid, colon cancer s/p resection 2002, osteoporosis, s/p tavr, presents w/ pain, erythema LLE, admitted with cellulitis, confirmed DVT.    1) Sepsis poa  secondary to LLE Cellulitis.   - erythema, pain, swelling LLE surrounding area of injection site. Patient had Varithena procedure on Monday (foam injectable for varicose veins)  - Blood cultures with MSSA  Bacteremic   -Continue Ancef 1gm q12 hr ,  will need total 6 weeks  Repeat blood cultures - if neg x 48 hours then will place PICC  - ECHO no vegetation        2) LLE DVT   US LLE: thrombosis is seen of the left posterior tibial vein in the mid calf where there is a perforating vein connected to the greater saphenous vein and extends down into the posterior tibial vein.  - This appears to be a provoked DVT in the setting of Varithena  -Stop heparin gtt due to anemia  s/p pRBC  H/H stable so resumed Lovenox full ac if tolerate switch to Eliquis   -Below knee DVT- repeat doppler There is persistent DVT affecting a left posterior tibial vein without   proximal propagation.    3) iron deficiency anemia -  - FOBT negative   and s/p 1 PRBC - GI Consulted  dr piedra  hx colon ca first   Risks of holding a/c outweigh benefits; monitor closely for GIB but no gi bleed ok to start blood thinner     4) Renal Insufficiency   - presents w/ Cr 1.6   Avoid nephrotoxic medications, now  cr 1.1      5) Hyponatremia.   Suspect hypovolemic hyponatremia 2/2 decreased PO intake  - Improved with IVF , Encourage for oral intake     6) Transaminitis  - RUQ US- liver is normal in size and echogenicity without focal lesions.    Atrophic right kidney.  - trend enzymes transaminitis  improved     7) HTN (hypertension).   on irbesartan,  resumed - bp stable .     8) Hypothyroid.   - continue home synthroid.    9) S/P TAVR (transcatheter aortic valve replacement).   - continue home asa.  - TTE to r/o Endocarditis -. Left ventricular systolic function is normal with an ejection fraction of 65 % by Quinones's method of disks.   2. Normal right ventricular cavity size, normal right ventricular wall thickness and normal right ventricular systolic function.   3. TAVR appears well seated with grossly normal function.   4. Mild to moderate mitral regurgitation.   5. Mild tricuspid regurgitation.    DVT Prophylaxis -- started on  Lovenox 60 mg sc bid for dvt

## 2023-09-06 NOTE — PROGRESS NOTE ADULT - SUBJECTIVE AND OBJECTIVE BOX
VASU CARBAJAL is a 89yFemale , patient examined and chart reviewed.    INTERVAL HPI/ OVERNIGHT EVENTS:   NAD. Afebrile.  In chair. No events.    PAST MEDICAL & SURGICAL HISTORY:  HTN (hypertension)  H/O osteoporosis  Hypothyroid  History of colon cancer  S/P TAVR (transcatheter aortic valve replacement)  S/P colon resection  S/P knee replacement  S/P cataract surgery      For details regarding the patient's social history, family history, and other miscellaneous elements, please refer the initial infectious diseases consultation and/or the admitting history and physical examination for this admission.    ROS:  CONSTITUTIONAL:  Negative fever or chills  EYES:  Negative  blurry vision or double vision  CARDIOVASCULAR:  Negative for chest pain or palpitations  RESPIRATORY:  Negative for cough, wheezing, or SOB   GASTROINTESTINAL:  Negative for nausea, vomiting, diarrhea, constipation, or abdominal pain  GENITOURINARY:  Negative frequency, urgency or dysuria  NEUROLOGIC:  No headache, confusion, dizziness, lightheadedness  All other systems were reviewed and are negative     ALLERGIES  morphine (Nausea)  No Known Allergies      Current inpatient medications :    ANTIBIOTICS/RELEVANT:  ceFAZolin   IVPB 2000 milliGRAM(s) IV Intermittent every 12 hours    MEDICATIONS  (STANDING):  aspirin  chewable 81 milliGRAM(s) Oral daily  influenza  Vaccine (HIGH DOSE) 0.7 milliLiter(s) IntraMuscular once  levothyroxine 100 MICROGram(s) Oral daily  losartan 100 milliGRAM(s) Oral daily  pantoprazole    Tablet 40 milliGRAM(s) Oral daily  sodium bicarbonate 650 milliGRAM(s) Oral two times a day    MEDICATIONS  (PRN):  acetaminophen     Tablet .. 650 milliGRAM(s) Oral every 6 hours PRN Temp greater or equal to 38C (100.4F), Mild Pain (1 - 3)  aluminum hydroxide/magnesium hydroxide/simethicone Suspension 30 milliLiter(s) Oral every 4 hours PRN Dyspepsia  melatonin 3 milliGRAM(s) Oral at bedtime PRN Insomnia  ondansetron Injectable 4 milliGRAM(s) IV Push every 8 hours PRN Nausea and/or Vomiting        Objective:  Vital Signs Last 24 Hrs  T(C): 37 (06 Sep 2023 05:07), Max: 37 (06 Sep 2023 05:07)  T(F): 98.6 (06 Sep 2023 05:07), Max: 98.6 (06 Sep 2023 05:07)  HR: 74 (06 Sep 2023 05:07) (67 - 74)  BP: 155/64 (06 Sep 2023 05:07) (151/61 - 155/64)  RR: 18 (06 Sep 2023 05:07) (18 - 18)  SpO2: 91% (06 Sep 2023 05:07) (91% - 94%)    Parameters below as of 06 Sep 2023 05:07  Patient On (Oxygen Delivery Method): room air    Physical Exam:  General:  no acute distress  Neck: supple, trachea midline  Lungs: clear, no wheeze/rhonchi  Cardiovascular: regular rate and rhythm, S1 S2  Abdomen: soft, nontender,  bowel sounds normal  Neurological: alert and oriented x3  Skin: no rash  Extremities: Left LE erythema swelling improving      LABS:                        9.8    14.00 )-----------( 164      ( 06 Sep 2023 06:03 )             29.0   09-06    137  |  110<H>  |  26<H>  ----------------------------<  105<H>  4.9   |  21<L>  |  1.10    Ca    8.3<L>      06 Sep 2023 06:03  Mg     2.2     09-05    TPro  5.3<L>  /  Alb  2.0<L>  /  TBili  0.4  /  DBili  x   /  AST  54<H>  /  ALT  100<H>  /  AlkPhos  272<H>  09-05      PTT - ( 04 Sep 2023 00:39 )  PTT:113.7 sec  Urinalysis Basic - ( 05 Sep 2023 05:55 )    Color: x / Appearance: x / SG: x / pH: x  Gluc: 99 mg/dL / Ketone: x  / Bili: x / Urobili: x   Blood: x / Protein: x / Nitrite: x   Leuk Esterase: x / RBC: x / WBC x   Sq Epi: x / Non Sq Epi: x / Bacteria: x    MICROBIOLOGY:    Culture - Blood (collected 05 Sep 2023 05:59)  Source: .Blood Blood  Preliminary Report (06 Sep 2023 10:02):    No growth at 24 hours    Culture - Blood (collected 05 Sep 2023 05:55)  Source: .Blood Blood  Preliminary Report (06 Sep 2023 10:02):    No growth at 24 hours    Culture - Urine (collected 04 Sep 2023 10:15)  Source: Clean Catch Clean Catch (Midstream)  Final Report (05 Sep 2023 16:39):    <10,000 CFU/mL Normal Urogenital Vikki    Culture - Blood (collected 04 Sep 2023 09:05)  Source: .Blood Blood  Preliminary Report (05 Sep 2023 15:01):    No growth at 24 hours    Culture - Blood (collected 04 Sep 2023 09:00)  Source: .Blood Blood  Gram Stain (05 Sep 2023 16:44):    Growth in anaerobic bottle: Gram Positive Cocci in Clusters    Growth in aerobic bottle: Gram Positive Cocci in Clusters  Final Report (06 Sep 2023 10:50):    Growth in aerobic and anaerobic bottles: Staphylococcus aureus    See previous culture [id number]    Culture - Blood (collected 03 Sep 2023 15:25)  Source: .Blood Blood-Peripheral  Gram Stain (04 Sep 2023 07:25):    Growth in aerobic and anaerobic bottles: Gram Positive Cocci in Clusters  Final Report (05 Sep 2023 15:38):    Growth in aerobic and anaerobic bottles: Staphylococcus aureus    See previous culture [id number]    Culture - Blood (collected 03 Sep 2023 15:15)  Source: .Blood Blood-Peripheral  Gram Stain (04 Sep 2023 08:01):    Growth in aerobic bottle: Gram Positive Cocci in Clusters    Growth in anaerobic bottle: Gram Positive Cocci in Clusters  Final Report (05 Sep 2023 15:37):    Growth in aerobic and anaerobic bottles: Staphylococcus aureus    Direct identification is available within approximately 3-5    hours either by Blood Panel Multiplexed PCR or Direct    MALDI-TOF. Details: [email or web address]  Organism: Blood Culture PCR  Staphylococcus aureus (05 Sep 2023 15:37)  Organism: Staphylococcus aureus (05 Sep 2023 15:37)      -  Clindamycin: S <=0.25      -  Oxacillin: S <=0.25 Oxacillin predicts susceptibility for dicloxacillin, methicillin, and nafcillin      -  Gentamicin: S <=1 Should not be used as monotherapy      -  Cefazolin: S <=4      -  Vancomycin: S 2      -  Tetracycline: S <=1      Method Type: RAMOS      -  Ampicillin/Sulbactam: S <=8/4      -  Rifampin: S <=1 Should not be used as monotherapy      -  Erythromycin: S <=0.25      -  Trimethoprim/Sulfamethoxazole: S <=0.5/9.5  Organism: Blood Culture PCR (05 Sep 2023 15:37)      Method Type: PCR      -  Methicillin SENSITIVE Staphylococcus aureus (MSSA): Detec Any isolate of Staphylococcus aureus from a blood culture is NOT considered a contaminant    RADIOLOGY & ADDITIONAL STUDIES:  ACC: [id number] EXAM:  US ABDOMEN RT UPR QUADRANT   ORDERED BY: BARAK HAWKINS     PROCEDURE DATE:  09/04/2023          INTERPRETATION:  CLINICAL INFORMATION: Abnormal liver function tests.    COMPARISON: None available.    TECHNIQUE: Sonography of the right upper quadrant.    FINDINGS:  Liver: Normal in size and echogenicity. No focal lesions identified.  Bile ducts: Normal caliber. Common bile duct measures 5 mm.  Gallbladder: Within normal limits. No stones or gallbladder wall   thickening. No sonographic Selby's sign.  Pancreas: Poorly visualized.  Right kidney: 6.5 cm. No hydronephrosis. Atrophic.  Ascites: None.  IVC: Visualized portions are within normal limits. Diffuse   atherosclerotic calcifications of the abdominal aorta.    IMPRESSION:  The liver is normal in size and echogenicity without focal lesions.    Atrophic right kidney.    ACC: [id number] EXAM:  US DPLX LWR EXT VEINS LTD LT   ORDERED BY: BEV RHODES     PROCEDURE DATE:  09/03/2023          INTERPRETATION:  CLINICAL INFORMATION: Redness and swelling of the lower   extremity    COMPARISON: None available.    TECHNIQUE:Duplex sonography of the LEFT LOWER extremity veins with color   and spectral Doppler, with and without compression.    FINDINGS:    There is normal compressibility of the left common femoral, femoral and   popliteal veins. The contralateral common femoral vein is patent.  Doppler examination shows normal spontaneous and phasic flow of the veins   above the knee.    However, thrombosis is seen of the left posterior tibial vein in the mid   calf where there is a perforating vein connected to the greater saphenous   vein and extends down into the posterior tibial vein.      IMPRESSION:  Acute deep venous thrombosis: below the knee.      TRANSTHORACIC ECHOCARDIOGRAM REPORT  ________________________________________________________________________________                                      _______       Pt. Name:       JUNE SOLOMON Study Date:    9/5/2023  MRN:            [id number]      YOB: 1930  Accession #:    [id number]      Age:           89 years  Account#:       [id number]     Gender:        F  Heart Rate:                    Height:        62.20 in (158.00 cm)  Rhythm:                        Weight:134.48 lb (61.00 kg)  Blood Pressure: 163/62 mmHg    BSA/BMI:       1.62 m² / 24.44 kg/m²  ________________________________________________________________________________________  Referring Physician:    [id number] Nelson Rich  Interpreting Physician: Marcelle Martinez  Primary Sonographer:    Nini Butler [facility]    CPT:               ECHO TTE WO CON COMP [facility] - 14205.m  Indication(s):     Acute and subacute infective endocarditis - I33.0  Procedure:         Transthoracic echocardiogram with 2-D, M-mode and complete                     spectral and color flow Doppler.  Ordering Location: [facility]    _______________________________________________________________________________________     CONCLUSIONS:      1. Left ventricular systolic function is normal with an ejection fraction of 65 % by Quinones's method of disks.   2. Normal right ventricular cavity size, normal right ventricular wall thickness and normal right ventricular systolic function.   3. TAVR appears well seated with grossly normal function.   4. Mild to moderate mitral regurgitation.   5. Mild tricuspid regurgitation.   6. 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    Assessment :  89 y/o f pmhx varicose veins, HTN, hypothyroid, colon cancer s/p resection 2002, osteoporosis, s/p tavr, presents w/ pain, erythema LLE, admitted with cellulitis, confirmed DVT.  Sepsis secondary to LLE Cellulitis w/ MSSA Bacteremia  LLE DVT  - erythema, pain, swelling LLE surrounding area of injection site. Patient had recent Varithena procedure (foam injectable for varicose veins)  - Blood cultures with MSSA  - Repeat blood cultures 9/5 neg   - hx of TVAR TTE ok     Plan:  Cont Ancef- adjust dosing to 1gram q12h - will need total 6 weeks  Repeat blood cultures - if neg x 48 hours then will place PICC  Trend temps and cbc  Pulm toileting  Supportive care and additional management per primary team      Continue with present regiment.  Appropriate use of antibiotics and adverse effects reviewed.      I have discussed the above plan of care with patient in detail. She expressed understanding of the  treatment plan . Risks, benefits and alternatives discussed in detail. I have asked if she has any questions or concerns and appropriately addressed them to the best of my ability .    > 35 minutes were spent in direct patient care reviewing notes, medications ,labs data/ imaging , discussion with multidisciplinary team.    Thank you for allowing me to participate in care of your patient .    Timothy Marcus MD  Infectious Disease  [phone]

## 2023-09-06 NOTE — PROGRESS NOTE ADULT - TIME BILLING
pt seen and examine today see above plan -  MSSA  Bacteremic  cause sepsis poa   -on  Ancef 1gm q12 hr ,  will need total 6 weeks  Repeat blood cultures  if neg x 48 hours then will place PICC.

## 2023-09-07 ENCOUNTER — TRANSCRIPTION ENCOUNTER (OUTPATIENT)
Age: 88
End: 2023-09-07

## 2023-09-07 LAB
ANION GAP SERPL CALC-SCNC: 7 MMOL/L — SIGNIFICANT CHANGE UP (ref 5–17)
BASOPHILS # BLD AUTO: 0.06 K/UL — SIGNIFICANT CHANGE UP (ref 0–0.2)
BASOPHILS NFR BLD AUTO: 0.5 % — SIGNIFICANT CHANGE UP (ref 0–2)
BUN SERPL-MCNC: 28 MG/DL — HIGH (ref 7–23)
CALCIUM SERPL-MCNC: 8.5 MG/DL — SIGNIFICANT CHANGE UP (ref 8.5–10.1)
CHLORIDE SERPL-SCNC: 108 MMOL/L — SIGNIFICANT CHANGE UP (ref 96–108)
CO2 SERPL-SCNC: 23 MMOL/L — SIGNIFICANT CHANGE UP (ref 22–31)
CREAT SERPL-MCNC: 1.1 MG/DL — SIGNIFICANT CHANGE UP (ref 0.5–1.3)
EGFR: 47 ML/MIN/1.73M2 — LOW
EOSINOPHIL # BLD AUTO: 0.96 K/UL — HIGH (ref 0–0.5)
EOSINOPHIL NFR BLD AUTO: 7.6 % — HIGH (ref 0–6)
GLUCOSE SERPL-MCNC: 100 MG/DL — HIGH (ref 70–99)
HCT VFR BLD CALC: 31.2 % — LOW (ref 34.5–45)
HGB BLD-MCNC: 10.3 G/DL — LOW (ref 11.5–15.5)
IMM GRANULOCYTES NFR BLD AUTO: 5 % — HIGH (ref 0–0.9)
LYMPHOCYTES # BLD AUTO: 1.23 K/UL — SIGNIFICANT CHANGE UP (ref 1–3.3)
LYMPHOCYTES # BLD AUTO: 9.8 % — LOW (ref 13–44)
MCHC RBC-ENTMCNC: 29.7 PG — SIGNIFICANT CHANGE UP (ref 27–34)
MCHC RBC-ENTMCNC: 33 GM/DL — SIGNIFICANT CHANGE UP (ref 32–36)
MCV RBC AUTO: 89.9 FL — SIGNIFICANT CHANGE UP (ref 80–100)
MONOCYTES # BLD AUTO: 1.6 K/UL — HIGH (ref 0–0.9)
MONOCYTES NFR BLD AUTO: 12.7 % — SIGNIFICANT CHANGE UP (ref 2–14)
NEUTROPHILS # BLD AUTO: 8.11 K/UL — HIGH (ref 1.8–7.4)
NEUTROPHILS NFR BLD AUTO: 64.4 % — SIGNIFICANT CHANGE UP (ref 43–77)
NRBC # BLD: 0 /100 WBCS — SIGNIFICANT CHANGE UP (ref 0–0)
PLATELET # BLD AUTO: SIGNIFICANT CHANGE UP K/UL (ref 150–400)
POTASSIUM SERPL-MCNC: 4.9 MMOL/L — SIGNIFICANT CHANGE UP (ref 3.5–5.3)
POTASSIUM SERPL-SCNC: 4.9 MMOL/L — SIGNIFICANT CHANGE UP (ref 3.5–5.3)
RBC # BLD: 3.47 M/UL — LOW (ref 3.8–5.2)
RBC # FLD: 16.5 % — HIGH (ref 10.3–14.5)
SODIUM SERPL-SCNC: 138 MMOL/L — SIGNIFICANT CHANGE UP (ref 135–145)
WBC # BLD: 12.59 K/UL — HIGH (ref 3.8–10.5)
WBC # FLD AUTO: 12.59 K/UL — HIGH (ref 3.8–10.5)

## 2023-09-07 PROCEDURE — 99233 SBSQ HOSP IP/OBS HIGH 50: CPT | Mod: GC

## 2023-09-07 RX ORDER — SOD,AMMONIUM,POTASSIUM LACTATE
1 CREAM (GRAM) TOPICAL
Refills: 0 | Status: DISCONTINUED | OUTPATIENT
Start: 2023-09-07 | End: 2023-09-09

## 2023-09-07 RX ADMIN — Medication 1 APPLICATION(S): at 18:58

## 2023-09-07 RX ADMIN — Medication 100 MILLIGRAM(S): at 18:58

## 2023-09-07 RX ADMIN — ENOXAPARIN SODIUM 60 MILLIGRAM(S): 100 INJECTION SUBCUTANEOUS at 04:45

## 2023-09-07 RX ADMIN — PANTOPRAZOLE SODIUM 40 MILLIGRAM(S): 20 TABLET, DELAYED RELEASE ORAL at 14:04

## 2023-09-07 RX ADMIN — Medication 100 MILLIGRAM(S): at 04:44

## 2023-09-07 RX ADMIN — Medication 100 MICROGRAM(S): at 04:44

## 2023-09-07 RX ADMIN — LOSARTAN POTASSIUM 100 MILLIGRAM(S): 100 TABLET, FILM COATED ORAL at 04:45

## 2023-09-07 RX ADMIN — Medication 81 MILLIGRAM(S): at 14:04

## 2023-09-07 RX ADMIN — Medication 650 MILLIGRAM(S): at 04:44

## 2023-09-07 RX ADMIN — ENOXAPARIN SODIUM 60 MILLIGRAM(S): 100 INJECTION SUBCUTANEOUS at 18:59

## 2023-09-07 NOTE — PROGRESS NOTE ADULT - SUBJECTIVE AND OBJECTIVE BOX
Patient is a 89y old  Female who presents with a chief complaint of DVT / cellulitis (07 Sep 2023 11:15)      INTERVAL HPI/OVERNIGHT EVENTS:     T(C): 36.5 (09-07-23 @ 13:38), Max: 36.7 (09-07-23 @ 04:55)  HR: 65 (09-07-23 @ 13:38) (65 - 78)  BP: 137/60 (09-07-23 @ 13:38) (126/62 - 150/66)  RR: 17 (09-07-23 @ 13:38) (17 - 18)  SpO2: 95% (09-07-23 @ 13:38) (91% - 95%)  Wt(kg): --  I&O's Summary      REVIEW OF SYSTEMS:  CONSTITUTIONAL: No fever, weight loss, or fatigue  EYES: No eye pain, visual disturbances, or discharge  ENMT:  No difficulty hearing, tinnitus, vertigo; No sinus or throat pain  NECK: No pain or stiffness  BREASTS: No pain, no masses  RESPIRATORY: No cough, wheezing, chills or hemoptysis; No shortness of breath  CARDIOVASCULAR: No chest pain, palpitations, dizziness, or leg swelling  GASTROINTESTINAL: No abdominal or epigastric pain. No nausea, vomiting, or hematemesis; No diarrhea or constipation. No melena or hematochezia.  GENITOURINARY: No dysuria, frequency, hematuria, or incontinence  NEUROLOGICAL: No headaches, memory loss, loss of strength, numbness, or tremors  SKIN: No itching, burning, rashes, or lesions   MUSCULOSKELETAL: No joint pain or swelling; No muscle, back, or extremity pain    PHYSICAL EXAM:  GENERAL: NAD, well-groomed, well-developed  HEAD:  Atraumatic, Normocephalic  EYES: EOMI, PERRLA, conjunctiva and sclera clear  ENMT: No tonsillar erythema, exudates, or enlargement; Moist mucous membranes  NECK: Supple, No JVD  NERVOUS SYSTEM:  Alert & Oriented X3; Motor Strength 5/5 B/L upper and lower extremities; DTRs 2+ intact and symmetric  CHEST/LUNG: Clear to percussion bilaterally; No rales, rhonchi, wheezing, or rubs  HEART: Regular rate and rhythm; No murmurs, rubs, or gallops  ABDOMEN: Soft, Nontender, Nondistended; Bowel sounds present  EXTREMITIES:  2+ Peripheral Pulses, No clubbing, cyanosis, or edema  SKIN: No rashes or lesions    MEDICATIONS  (STANDING):  aspirin  chewable 81 milliGRAM(s) Oral daily  ceFAZolin   IVPB 1000 milliGRAM(s) IV Intermittent every 12 hours  enoxaparin Injectable 60 milliGRAM(s) SubCutaneous every 12 hours  influenza  Vaccine (HIGH DOSE) 0.7 milliLiter(s) IntraMuscular once  levothyroxine 100 MICROGram(s) Oral daily  losartan 100 milliGRAM(s) Oral daily  pantoprazole    Tablet 40 milliGRAM(s) Oral daily  PreserVision Areds 2 2 Tablet(s) 1 Capsule(s) Oral two times a day    MEDICATIONS  (PRN):  acetaminophen     Tablet .. 650 milliGRAM(s) Oral every 6 hours PRN Temp greater or equal to 38C (100.4F), Mild Pain (1 - 3)  aluminum hydroxide/magnesium hydroxide/simethicone Suspension 30 milliLiter(s) Oral every 4 hours PRN Dyspepsia  melatonin 3 milliGRAM(s) Oral at bedtime PRN Insomnia  ondansetron Injectable 4 milliGRAM(s) IV Push every 8 hours PRN Nausea and/or Vomiting      LABS:                        10.3   12.59 )-----------( Clumped    ( 07 Sep 2023 06:10 )             31.2     09-07    138  |  108  |  28<H>  ----------------------------<  100<H>  4.9   |  23  |  1.10    Ca    8.5      07 Sep 2023 06:10        Urinalysis Basic - ( 07 Sep 2023 06:10 )    Color: x / Appearance: x / SG: x / pH: x  Gluc: 100 mg/dL / Ketone: x  / Bili: x / Urobili: x   Blood: x / Protein: x / Nitrite: x   Leuk Esterase: x / RBC: x / WBC x   Sq Epi: x / Non Sq Epi: x / Bacteria: x      CAPILLARY BLOOD GLUCOSE          09-05 @ 05:59   No growth at 48 Hours  --  --  09-05 @ 05:55   No growth at 48 Hours  --  --  09-04 @ 10:15   <10,000 CFU/mL Normal Urogenital Vikki  --  --  09-04 @ 09:05   No growth at 48 Hours  --  --  09-04 @ 09:00   Growth in aerobic and anaerobic bottles: Staphylococcus aureus  See previous culture [id number]  --  --  09-03 @ 15:25   Growth in aerobic and anaerobic bottles: Staphylococcus aureus  See previous culture [id number]  --  --  09-03 @ 15:15   Growth in aerobic and anaerobic bottles: Staphylococcus aureus  Direct identification is available within approximately 3-5  hours either by Blood Panel Multiplexed PCR or Direct  MALDI-TOF. Details: [email or web address]  --  Blood Culture PCR          RADIOLOGY & ADDITIONAL TESTS:    Imaging Personally Reviewed:       Advance Directives:      Palliative Care:  Appropriate     Patient is a 89y old  Female who presents with a chief complaint of DVT / cellulitis (07 Sep 2023 11:15)    pt seen and examine today with lt leg redness  + , no fever  , oob .   INTERVAL HPI/OVERNIGHT EVENTS:     T(C): 36.5 (09-07-23 @ 13:38), Max: 36.7 (09-07-23 @ 04:55)  HR: 65 (09-07-23 @ 13:38) (65 - 78)  BP: 137/60 (09-07-23 @ 13:38) (126/62 - 150/66)  RR: 17 (09-07-23 @ 13:38) (17 - 18)  SpO2: 95% (09-07-23 @ 13:38) (91% - 95%)  Wt(kg): --  I&O's Summary      REVIEW OF SYSTEMS:  CONSTITUTIONAL: No fever, weight loss, or fatigue  EYES: No eye pain, visual disturbances, or discharge  ENMT:  No difficulty hearing, tinnitus, vertigo; No sinus or throat pain  NECK: No pain or stiffness  BREASTS: No pain, no masses  RESPIRATORY: No cough, wheezing, chills or hemoptysis; No shortness of breath  CARDIOVASCULAR: No chest pain, palpitations, dizziness, or leg swelling  GASTROINTESTINAL: No abdominal or epigastric pain. No nausea, vomiting, or hematemesis; No diarrhea or constipation. No melena or hematochezia.  GENITOURINARY: No dysuria, frequency, hematuria, or incontinence  NEUROLOGICAL: No headaches, memory loss, loss of strength, numbness, or tremors  SKIN: No itching, burning, rashes, or lesions   MUSCULOSKELETAL: No joint pain or swelling; No muscle, back, or extremity pain    PHYSICAL EXAM:  GENERAL: NAD, well-groomed, well-developed  HEAD:  Atraumatic, Normocephalic  EYES: EOMI, PERRLA, conjunctiva and sclera clear  ENMT: No tonsillar erythema, exudates, or enlargement; Moist mucous membranes  NECK: Supple, No JVD  NERVOUS SYSTEM:  Alert & Oriented X3; Motor Strength 5/5 B/L upper and lower extremities; DTRs 2+ intact and symmetric  CHEST/LUNG: Clear to percussion bilaterally; No rales, rhonchi, wheezing, or rubs  HEART: Regular rate and rhythm; No murmurs, rubs, or gallops  ABDOMEN: Soft, Nontender, Nondistended; Bowel sounds present  EXTREMITIES:  2+ Peripheral Pulses, No clubbing, cyanosis, or edema  SKIN: No rashes or lesions lt leg dryness eczema     MEDICATIONS  (STANDING):  aspirin  chewable 81 milliGRAM(s) Oral daily  ceFAZolin   IVPB 1000 milliGRAM(s) IV Intermittent every 12 hours  enoxaparin Injectable 60 milliGRAM(s) SubCutaneous every 12 hours  influenza  Vaccine (HIGH DOSE) 0.7 milliLiter(s) IntraMuscular once  levothyroxine 100 MICROGram(s) Oral daily  losartan 100 milliGRAM(s) Oral daily  pantoprazole    Tablet 40 milliGRAM(s) Oral daily  PreserVision Areds 2 2 Tablet(s) 1 Capsule(s) Oral two times a day    MEDICATIONS  (PRN):  acetaminophen     Tablet .. 650 milliGRAM(s) Oral every 6 hours PRN Temp greater or equal to 38C (100.4F), Mild Pain (1 - 3)  aluminum hydroxide/magnesium hydroxide/simethicone Suspension 30 milliLiter(s) Oral every 4 hours PRN Dyspepsia  melatonin 3 milliGRAM(s) Oral at bedtime PRN Insomnia  ondansetron Injectable 4 milliGRAM(s) IV Push every 8 hours PRN Nausea and/or Vomiting      LABS:                        10.3   12.59 )-----------( Clumped    ( 07 Sep 2023 06:10 )             31.2     09-07    138  |  108  |  28<H>  ----------------------------<  100<H>  4.9   |  23  |  1.10    Ca    8.5      07 Sep 2023 06:10        Urinalysis Basic - ( 07 Sep 2023 06:10 )    Color: x / Appearance: x / SG: x / pH: x  Gluc: 100 mg/dL / Ketone: x  / Bili: x / Urobili: x   Blood: x / Protein: x / Nitrite: x   Leuk Esterase: x / RBC: x / WBC x   Sq Epi: x / Non Sq Epi: x / Bacteria: x      CAPILLARY BLOOD GLUCOSE          09-05 @ 05:59   No growth at 48 Hours  --  --  09-05 @ 05:55   No growth at 48 Hours  --  --  09-04 @ 10:15   <10,000 CFU/mL Normal Urogenital Vikki  --  --  09-04 @ 09:05   No growth at 48 Hours  --  --  09-04 @ 09:00   Growth in aerobic and anaerobic bottles: Staphylococcus aureus  See previous culture [id number]  --  --  09-03 @ 15:25   Growth in aerobic and anaerobic bottles: Staphylococcus aureus  See previous culture [id number]  --  --  09-03 @ 15:15   Growth in aerobic and anaerobic bottles: Staphylococcus aureus  Direct identification is available within approximately 3-5  hours either by Blood Panel Multiplexed PCR or Direct  MALDI-TOF. Details: [email or web address]  --  Blood Culture PCR          RADIOLOGY & ADDITIONAL TESTS:    Imaging Personally Reviewed:       Advance Directives:      Palliative Care:  Appropriate     Patient is a 89y old  Female who presents with a chief complaint of DVT / cellulitis (07 Sep 2023 11:15)    pt seen and examine today with lt leg redness  + , no fever  , oob .   INTERVAL HPI/OVERNIGHT EVENTS:     T(C): 36.5 (09-07-23 @ 13:38), Max: 36.7 (09-07-23 @ 04:55)  HR: 65 (09-07-23 @ 13:38) (65 - 78)  BP: 137/60 (09-07-23 @ 13:38) (126/62 - 150/66)  RR: 17 (09-07-23 @ 13:38) (17 - 18)  SpO2: 95% (09-07-23 @ 13:38) (91% - 95%)  Wt(kg): --  I&O's Summary      REVIEW OF SYSTEMS:  CONSTITUTIONAL: No fever, weight loss, or fatigue  EYES: No eye pain, visual disturbances, or discharge  ENMT:  No difficulty hearing, tinnitus, vertigo; No sinus or throat pain  NECK: No pain or stiffness  BREASTS: No pain, no masses  RESPIRATORY: No cough, wheezing, chills  No shortness of breath  CARDIOVASCULAR: No chest pain, palpitations, dizziness, or leg swelling  GASTROINTESTINAL: No abdominal or epigastric pain. No nausea, vomiting,  No diarrhea or constipation.  GENITOURINARY: No dysuria, frequency, hematuria, or incontinence  NEUROLOGICAL: No headaches, memory loss, loss of strength, numbness, or tremors  SKIN: No itching, burning, rashes, or lesions   MUSCULOSKELETAL: No joint pain or swelling; No muscle, back, or extremity pain    PHYSICAL EXAM:  GENERAL: NAD,  HEAD:  Atraumatic, Normocephalic  EYES: EOMI, PERRLA, conjunctiva and sclera clear  ENMT: t; Moist mucous membranes  NECK: Supple, No JVD  NERVOUS SYSTEM:  Alert & Oriented X3; Motor Strength 5/5 B/L upper and lower extremities; DTRs 2+ intact and symmetric  CHEST/LUNG:  percussion bilaterally; No rales, rhonchi, wheezing,   HEART: Regular rate and rhythm; No murmurs,  ABDOMEN: Soft, Nontender, Nondistended; Bowel sounds present  EXTREMITIES:  2+ Peripheral Pulses, No clubbing, cyanosis, or edema  SKIN: No rashes or lesions + lt leg redness  dryness eczema     MEDICATIONS  (STANDING):  aspirin  chewable 81 milliGRAM(s) Oral daily  ceFAZolin   IVPB 1000 milliGRAM(s) IV Intermittent every 12 hours  enoxaparin Injectable 60 milliGRAM(s) SubCutaneous every 12 hours  influenza  Vaccine (HIGH DOSE) 0.7 milliLiter(s) IntraMuscular once  levothyroxine 100 MICROGram(s) Oral daily  losartan 100 milliGRAM(s) Oral daily  pantoprazole    Tablet 40 milliGRAM(s) Oral daily  PreserVision Areds 2 2 Tablet(s) 1 Capsule(s) Oral two times a day    MEDICATIONS  (PRN):  acetaminophen     Tablet .. 650 milliGRAM(s) Oral every 6 hours PRN Temp greater or equal to 38C (100.4F), Mild Pain (1 - 3)  aluminum hydroxide/magnesium hydroxide/simethicone Suspension 30 milliLiter(s) Oral every 4 hours PRN Dyspepsia  melatonin 3 milliGRAM(s) Oral at bedtime PRN Insomnia  ondansetron Injectable 4 milliGRAM(s) IV Push every 8 hours PRN Nausea and/or Vomiting      LABS:                        10.3   12.59 )-----------( Clumped    ( 07 Sep 2023 06:10 )             31.2     09-07    138  |  108  |  28<H>  ----------------------------<  100<H>  4.9   |  23  |  1.10    Ca    8.5      07 Sep 2023 06:10        Urinalysis Basic - ( 07 Sep 2023 06:10 )    Color: x / Appearance: x / SG: x / pH: x  Gluc: 100 mg/dL / Ketone: x  / Bili: x / Urobili: x   Blood: x / Protein: x / Nitrite: x   Leuk Esterase: x / RBC: x / WBC x   Sq Epi: x / Non Sq Epi: x / Bacteria: x      CAPILLARY BLOOD GLUCOSE          09-05 @ 05:59   No growth at 48 Hours  --  --  09-05 @ 05:55   No growth at 48 Hours  --  --  09-04 @ 10:15   <10,000 CFU/mL Normal Urogenital Vikki  --  --  09-04 @ 09:05   No growth at 48 Hours  --  --  09-04 @ 09:00   Growth in aerobic and anaerobic bottles: Staphylococcus aureus  See previous culture [id number]  --  --  09-03 @ 15:25   Growth in aerobic and anaerobic bottles: Staphylococcus aureus  See previous culture [id number]  --  --  09-03 @ 15:15   Growth in aerobic and anaerobic bottles: Staphylococcus aureus  Direct identification is available within approximately 3-5  hours either by Blood Panel Multiplexed PCR or Direct  MALDI-TOF. Details: [email or web address]  --  Blood Culture PCR          RADIOLOGY & ADDITIONAL TESTS:    Imaging Personally Reviewed:     no new test  Advance Directives:  full code    Palliative Care:  Appropriate

## 2023-09-07 NOTE — PROGRESS NOTE ADULT - ASSESSMENT
89 y/o f pmhx varicose veins, HTN, hypothyroid, colon cancer s/p resection 2002, osteoporosis, s/p tavr, presents w/ pain, erythema LLE, admitted with cellulitis, confirmed DVT.    1) Sepsis poa  secondary to LLE Cellulitis.   - erythema, pain, swelling LLE surrounding area of injection site. Patient had Varithena procedure on Monday (foam injectable for varicose veins)  - Blood cultures with MSSA  Bacteremic   -Continue Ancef 1gm q12 hr ,  will need total 6 weeks  Repeat blood cultures - if neg x 48 hours then will place PICC  - ECHO no vegetation        2) LLE DVT   US LLE: thrombosis is seen of the left posterior tibial vein in the mid calf where there is a perforating vein connected to the greater saphenous vein and extends down into the posterior tibial vein.  - This appears to be a provoked DVT in the setting of Varithena  -Stop heparin gtt due to anemia  s/p pRBC  H/H stable so resumed Lovenox full ac if tolerate switch to Eliquis   -Below knee DVT- repeat doppler There is persistent DVT affecting a left posterior tibial vein without   proximal propagation.    3) iron deficiency anemia -  - FOBT negative   and s/p 1 PRBC - GI Consulted  dr piedra  hx colon ca first   Risks of holding a/c outweigh benefits; monitor closely for GIB but no gi bleed ok to start blood thinner     4) Renal Insufficiency   - presents w/ Cr 1.6   Avoid nephrotoxic medications, now  cr 1.1      5) Hyponatremia.   Suspect hypovolemic hyponatremia 2/2 decreased PO intake  - Improved with IVF , Encourage for oral intake     6) Transaminitis  - RUQ US- liver is normal in size and echogenicity without focal lesions.    Atrophic right kidney.  - trend enzymes transaminitis  improved     7) HTN (hypertension).   on irbesartan,  resumed - bp stable .     8) Hypothyroid.   - continue home synthroid.    9) S/P TAVR (transcatheter aortic valve replacement).   - continue home asa.  - TTE to r/o Endocarditis -. Left ventricular systolic function is normal with an ejection fraction of 65 % by Quinones's method of disks.   2. Normal right ventricular cavity size, normal right ventricular wall thickness and normal right ventricular systolic function.   3. TAVR appears well seated with grossly normal function.   4. Mild to moderate mitral regurgitation.   5. Mild tricuspid regurgitation.    DVT Prophylaxis -- started on  Lovenox 60 mg sc bid for dvt    89 y/o f pmhx varicose veins, HTN, hypothyroid, colon cancer s/p resection 2002, osteoporosis, s/p tavr, presents w/ pain, erythema LLE, admitted with cellulitis, confirmed DVT.    1) Sepsis poa  secondary to LLE Cellulitis /eczema .   - erythema, pain, swelling LLE surrounding area of injection site. Patient had Varithena procedure on Monday (foam injectable for varicose veins)  - Blood cultures with MSSA  Bacteremic   -Continue Ancef 1gm q12 hr ,  will need total 6 weeks  Repeat blood cultures - if neg x 48 hours then will place PICC  - ECHO no vegetation        2) LLE DVT   US LLE: thrombosis is seen of the left posterior tibial vein in the mid calf where there is a perforating vein connected to the greater saphenous vein and extends down into the posterior tibial vein.  - This appears to be a provoked DVT in the setting of Varithena  -Stop heparin gtt due to anemia  s/p pRBC  H/H stable so resumed Lovenox full ac if tolerate switch to Eliquis   -Below knee DVT- repeat doppler There is persistent DVT affecting a left posterior tibial vein without   proximal propagation.    3) iron deficiency anemia -  - FOBT negative   and s/p 1 PRBC - GI Consulted  dr piedra  hx colon ca first   Risks of holding a/c outweigh benefits; monitor closely for GIB but no gi bleed ok to start blood thinner     4) Renal Insufficiency   - presents w/ Cr 1.6   Avoid nephrotoxic medications, now  cr 1.1      5) Hyponatremia.   Suspect hypovolemic hyponatremia 2/2 decreased PO intake  - Improved with IVF , Encourage for oral intake     6) Transaminitis  - RUQ US- liver is normal in size and echogenicity without focal lesions.    Atrophic right kidney.  - trend enzymes transaminitis  improved     7) HTN (hypertension).   on irbesartan,  resumed - bp stable .     8) Hypothyroid.   - continue home synthroid.    9) S/P TAVR (transcatheter aortic valve replacement).   - continue home asa.  - TTE to r/o Endocarditis -. Left ventricular systolic function is normal with an ejection fraction of 65 % by Quinones's method of disks.   2. Normal right ventricular cavity size, normal right ventricular wall thickness and normal right ventricular systolic function.   3. TAVR appears well seated with grossly normal function.   4. Mild to moderate mitral regurgitation.   5. Mild tricuspid regurgitation.    DVT Prophylaxis -- started on  Lovenox 60 mg sc bid for dvt    89 y/o f pmhx varicose veins, HTN, hypothyroid, colon cancer s/p resection 2002, osteoporosis, s/p tavr, presents w/ pain, erythema LLE, admitted with cellulitis, confirmed DVT.    1) Sepsis poa  secondary to LLE Cellulitis /eczema .   - erythema, pain, swelling LLE surrounding area of injection site. Patient had Varithena procedure on Monday (foam injectable for varicose veins)  - Blood cultures with MSSA  Bacteremic   -Continue Ancef 1gm q12 hr ,  will need total 6 weeks  Repeat blood cultures - neg x 48 hours - place PICC tomorrow   - ECHO no vegetation        2) LLE DVT   US LLE: thrombosis is seen of the left posterior tibial vein in the mid calf where there is a perforating vein connected to the greater saphenous vein and extends down into the posterior tibial vein.  - This appears to be a provoked DVT in the setting of Varithena  -Stop heparin gtt due to anemia  s/p pRBC  H/H stable so resumed Lovenox full ac if tolerate switch to Eliquis   -Below knee DVT- repeat doppler There is persistent DVT affecting a left posterior tibial vein without   proximal propagation.    3) iron deficiency anemia -  - FOBT negative   and s/p 1 PRBC - GI Consulted  dr piedra  hx colon ca first   Risks of holding a/c outweigh benefits-; monitor closely for GIB but no gi bleed  so ok to start blood thinner     4) Renal Insufficiency   - presents w/ Cr 1.6   Avoid nephrotoxic medications, now  cr 1.1      5) Hyponatremia.   Suspect hypovolemic hyponatremia 2/2 decreased PO intake  - Improved with IVF , Encourage for oral intake     6) Transaminitis  - RUQ US- liver is normal in size and echogenicity without focal lesions.    Atrophic right kidney.  - trend enzymes transaminitis  improved     7) HTN (hypertension).   on irbesartan,  resumed - bp stable .     8) Hypothyroid.   - continue home synthroid.    9) S/P TAVR (transcatheter aortic valve replacement).   - continue home asa.  - TTE to r/o Endocarditis -. Left ventricular systolic function is normal with an ejection fraction of 65 % by Quinones's method of disks.   2. Normal right ventricular cavity size, normal right ventricular wall thickness and normal right ventricular systolic function.   3. TAVR appears well seated with grossly normal function.   4. Mild to moderate mitral regurgitation.   5. Mild tricuspid regurgitation.    DVT Prophylaxis -- started on  Lovenox 60 mg sc bid for dvt

## 2023-09-07 NOTE — DISCHARGE NOTE NURSING/CASE MANAGEMENT/SOCIAL WORK - NSTRANSFERBELONGINGSRESP_GEN_A_NUR
Physical Therapy Visit    Visit Type: Daily Treatment Note  Visit: 7  Referring Provider: Don Miller NP  Medical Diagnosis (from order): Diagnosis Information    Diagnosis  V58.89, 836.2 (ICD-9-CM) - S83.203D (ICD-10-CM) - Complex tear of meniscus of right knee as current injury, unspecified meniscus, subsequent encounter         SUBJECTIVE                                                                                                               Patient denies any new symptoms of right knee and compliance w HEP. Patient has surgical follow-up later today.      OBJECTIVE                                                                                                                     Range of Motion (ROM)   (degrees unless noted; active unless noted; norms in ( ); negative=lacking to 0, positive=beyond 0)  Knee:   - Flexion (150):      • Right:  131    - Extension (0-10):      • Right:  2                        Treatment     Therapeutic Exercise  PERFORMED  Stationary bike x5 min (seat at 10) level 5  SL bridging 2x10 BLEs  Side plank + clamshell 2x10 BLEs   Sidelying hip abduction w 1# ankle weights x20 BLEs  SL squat to high table height x10, RLE - right knee valgus   Right knee AROM  HEP reviewed    Therapeutic Activity          Neuromuscular Re-Education  PERFORMED  Seated LAQs w 3# ankle weight on RLE, x30, 3s holds - eccentric focus   Forward lunge x15, BLE lead   Inverted bosu squats x20 no UE support     Skilled input: verbal instruction/cues, tactile instruction/cues, posture correction and demonstration    Writer verbally educated and received verbal consent for hand placement, positioning of patient, and techniques to be performed today from patient for clothing adjustments for techniques, therapist position for techniques and hand placement and palpation for techniques as described above and how they are pertinent to the patient's plan of care.  Home Exercise Program  Access Code: [email or web address]  URL:  [email or web address]  Date: 06/19/2023  Prepared by: Negar Garcia    Exercises  - Active Straight Leg Raise with Quad Set  - 2 x daily - 7 x weekly - 1 sets - 15 reps  - Seated Long Arc Quad  - 1 x daily - 7 x weekly - 3 sets - 10 reps - 3s hold  - Side Stepping with Resistance at Ankles  - 1 x daily - 7 x weekly - 2 sets - 10 reps  - Heel Raise on Step  - 1 x daily - 7 x weekly - 2 sets - 10 reps  - Forward Step Down with Heel Tap and Counter Support  - 2 x daily - 7 x weekly - 1 sets - 10 reps  - Sidelying Hip Abduction  - 2 x daily - 7 x weekly - 1 sets - 20 reps  - Single Leg Bridge  - 1 x daily - 7 x weekly - 2 sets - 10 reps  - Squat on Flat Side of BOSU®  - 1 x daily - 7 x weekly - 1 sets - 20 reps  - Seated Hamstring Stretch  - 2 x daily - 7 x weekly - 1 sets - 10 reps - 10s hold  - Seated Piriformis Stretch with Trunk Bend  - 1 x daily - 7 x weekly - 3 sets - 10 reps        ASSESSMENT                                                                                                            Pt demo's steady progression of right quad strengthening w minimal shakiness during eccentric lowering during LAQs, although able to tolerate w/o incr in symptoms. Pt continues to demo mild limitations in bilat glute strength, although able to progress w ankle weights today. Pt demo'd significant muscular fatigue after performing sidelying hip abduction, although able to translate improved dynamic control during eccentric squatting today. Therapist provided pt w updated HEP at end of session.   Education:   - Results of above outlined education: Verbalizes understanding and Demonstrates understanding    PLAN                                                                                                                           Suggestions for next session as indicated: Progress per plan of care       Therapy procedure time and total treatment time can be found documented on the Time Entry  flowsheet     yes

## 2023-09-07 NOTE — PROGRESS NOTE ADULT - SUBJECTIVE AND OBJECTIVE BOX
[facility]  Cuco Parada PA-C  [address] [place] [address]  [phone]      INTERVAL HPI/OVERNIGHT EVENTS:  Pt s/e  C/o nausea this am but no overt GI bleeding    MEDICATIONS  (STANDING):  aspirin  chewable 81 milliGRAM(s) Oral daily  ceFAZolin   IVPB 1000 milliGRAM(s) IV Intermittent every 12 hours  enoxaparin Injectable 60 milliGRAM(s) SubCutaneous every 12 hours  influenza  Vaccine (HIGH DOSE) 0.7 milliLiter(s) IntraMuscular once  levothyroxine 100 MICROGram(s) Oral daily  losartan 100 milliGRAM(s) Oral daily  pantoprazole    Tablet 40 milliGRAM(s) Oral daily  PreserVision Areds 2 2 Tablet(s) 1 Capsule(s) Oral two times a day    MEDICATIONS  (PRN):  acetaminophen     Tablet .. 650 milliGRAM(s) Oral every 6 hours PRN Temp greater or equal to 38C (100.4F), Mild Pain (1 - 3)  aluminum hydroxide/magnesium hydroxide/simethicone Suspension 30 milliLiter(s) Oral every 4 hours PRN Dyspepsia  melatonin 3 milliGRAM(s) Oral at bedtime PRN Insomnia  ondansetron Injectable 4 milliGRAM(s) IV Push every 8 hours PRN Nausea and/or Vomiting      Allergies    No Known Allergies    Intolerances    morphine (Nausea)      PHYSICAL EXAM:   Vital Signs:  Vital Signs Last 24 Hrs  T(C): 36.7 (07 Sep 2023 04:55), Max: 36.7 (07 Sep 2023 04:55)  T(F): 98 (07 Sep 2023 04:55), Max: 98 (07 Sep 2023 04:55)  HR: 75 (07 Sep 2023 04:55) (75 - 78)  BP: 150/66 (07 Sep 2023 04:55) (126/62 - 150/66)  BP(mean): --  RR: 18 (07 Sep 2023 04:55) (17 - 18)  SpO2: 91% (07 Sep 2023 04:55) (91% - 95%)    Parameters below as of 07 Sep 2023 04:55  Patient On (Oxygen Delivery Method): room air      Daily     Daily Weight in k (07 Sep 2023 04:55)    GENERAL:  Appears stated age  HEENT:  NC/AT  CHEST:  Full & symmetric excursion  HEART:  Regular rhythm  ABDOMEN:  Soft, non-tender, non-distended  EXTEREMITIES:  no cyanosis  SKIN:  No rash  NEURO:  Alert      LABS:                        10.3   12.59 )-----------( Clumped    ( 07 Sep 2023 06:10 )             31.2         138  |  108  |  28<H>  ----------------------------<  100<H>  4.9   |  23  |  1.10    Ca    8.5      07 Sep 2023 06:10        Urinalysis Basic - ( 07 Sep 2023 06:10 )    Color: x / Appearance: x / SG: x / pH: x  Gluc: 100 mg/dL / Ketone: x  / Bili: x / Urobili: x   Blood: x / Protein: x / Nitrite: x   Leuk Esterase: x / RBC: x / WBC x   Sq Epi: x / Non Sq Epi: x / Bacteria: x

## 2023-09-07 NOTE — PROGRESS NOTE ADULT - ASSESSMENT
Anemia  Acute DVT    Hgb noted, stable  Trend cbc  Transfuse prn  PPI for ppx  FOBT negative  Anti-emetics prn  Risks of holding a/c outweigh benefits; monitor closely for GIB on a/c  D/w pt    I reviewed the overnight course of events on the unit, re-confirming the patient history. I discussed the care with the patient and their family  Differential diagnosis and plan of care discussed with patient after the evaluation  40 minutes spent on total encounter of which more than fifty percent of the encounter was spent counseling and/or coordinating care by the attending physician.  Advanced care planning was discussed with patient and family.  Advanced care planning forms were reviewed and discussed.  Risks, benefits and alternatives of gastroenterologic procedures were discussed in detail and all questions were answered.

## 2023-09-07 NOTE — DISCHARGE NOTE NURSING/CASE MANAGEMENT/SOCIAL WORK - PATIENT PORTAL LINK FT
You can access the FollowMyHealth Patient Portal offered by [facility] by registering at the following website: [email or web address]. By joining [employer]’s FollowMyHealth portal, you will also be able to view your health information using other applications (apps) compatible with our system.

## 2023-09-07 NOTE — CASE MANAGEMENT PROGRESS NOTE - NSCMPROGRESSNOTE_GEN_ALL_CORE
Patient is to be transition home om long term IV antibiotic. Referral initiated and sent to [facility] infusion, referral accepted for SOC Saturday 9/9/2023 with the  afternoon dose. CM discussed the transition plan with patient, she agreed to it. Patient will be going to her daughter's house in [place] at the time on transition.

## 2023-09-07 NOTE — PROGRESS NOTE ADULT - TIME BILLING
pt seen and examine today see above plan -  MSSA  Bacteremic  cause sepsis poa   -on  Ancef 1gm q12 hr ,  will need total 6 weeks  Repeat blood cultures  if neg x 48 hours then will place PICC. pt seen and examine today see above plan -  MSSA  Bacteremic  cause sepsis poa   -on  Ancef 1gm q12 hr ,  will need total 6 weeks  Repeat blood cultures  neg x 48 hours  place PICC in am .

## 2023-09-07 NOTE — PROGRESS NOTE ADULT - SUBJECTIVE AND OBJECTIVE BOX
VASU CARBAJAL is a 89yFemale , patient examined and chart reviewed.    INTERVAL HPI/ OVERNIGHT EVENTS:   NAD. Afebrile.  In chair. No events.  Grand daughter at bedside.    PAST MEDICAL & SURGICAL HISTORY:  HTN (hypertension)  H/O osteoporosis  Hypothyroid  History of colon cancer  S/P TAVR (transcatheter aortic valve replacement)  S/P colon resection  S/P knee replacement  S/P cataract surgery      For details regarding the patient's social history, family history, and other miscellaneous elements, please refer the initial infectious diseases consultation and/or the admitting history and physical examination for this admission.    ROS:  CONSTITUTIONAL:  Negative fever or chills  EYES:  Negative  blurry vision or double vision  CARDIOVASCULAR:  Negative for chest pain or palpitations  RESPIRATORY:  Negative for cough, wheezing, or SOB   GASTROINTESTINAL:  Negative for nausea, vomiting, diarrhea, constipation, or abdominal pain  GENITOURINARY:  Negative frequency, urgency or dysuria  NEUROLOGIC:  No headache, confusion, dizziness, lightheadedness  All other systems were reviewed and are negative     ALLERGIES  morphine (Nausea)  No Known Allergies      Current inpatient medications :    ANTIBIOTICS/RELEVANT:  ceFAZolin   IVPB 2000 milliGRAM(s) IV Intermittent every 12 hours    MEDICATIONS  (STANDING):  ammonium lactate 12% Lotion 1 Application(s) Topical two times a day  aspirin  chewable 81 milliGRAM(s) Oral daily  enoxaparin Injectable 60 milliGRAM(s) SubCutaneous every 12 hours  influenza  Vaccine (HIGH DOSE) 0.7 milliLiter(s) IntraMuscular once  levothyroxine 100 MICROGram(s) Oral daily  losartan 100 milliGRAM(s) Oral daily  pantoprazole    Tablet 40 milliGRAM(s) Oral daily  PreserVision Areds 2 2 Tablet(s) 1 Capsule(s) Oral two times a day    MEDICATIONS  (PRN):  acetaminophen     Tablet .. 650 milliGRAM(s) Oral every 6 hours PRN Temp greater or equal to 38C (100.4F), Mild Pain (1 - 3)  aluminum hydroxide/magnesium hydroxide/simethicone Suspension 30 milliLiter(s) Oral every 4 hours PRN Dyspepsia  melatonin 3 milliGRAM(s) Oral at bedtime PRN Insomnia  ondansetron Injectable 4 milliGRAM(s) IV Push every 8 hours PRN Nausea and/or Vomiting        Objective:  Vital Signs Last 24 Hrs  T(C): 37 (06 Sep 2023 05:07), Max: 37 (06 Sep 2023 05:07)  T(F): 98.6 (06 Sep 2023 05:07), Max: 98.6 (06 Sep 2023 05:07)  HR: 74 (06 Sep 2023 05:07) (67 - 74)  BP: 155/64 (06 Sep 2023 05:07) (151/61 - 155/64)  RR: 18 (06 Sep 2023 05:07) (18 - 18)  SpO2: 91% (06 Sep 2023 05:07) (91% - 94%)    Parameters below as of 06 Sep 2023 05:07  Patient On (Oxygen Delivery Method): room air    Physical Exam:  General:  no acute distress  Neck: supple, trachea midline  Lungs: clear, no wheeze/rhonchi  Cardiovascular: regular rate and rhythm, S1 S2  Abdomen: soft, nontender,  bowel sounds normal  Neurological: alert and oriented x3  Skin: no rash  Extremities: Left LE erythema swelling improving      LABS:                        10.3   12.59 )-----------( Clumped    ( 07 Sep 2023 06:10 )             31.2   09-07    138  |  108  |  28<H>  ----------------------------<  100<H>  4.9   |  23  |  1.10    Ca    8.5      07 Sep 2023 06:10    MICROBIOLOGY:    Culture - Blood (collected 05 Sep 2023 05:59)  Source: .Blood Blood  Preliminary Report (07 Sep 2023 10:01):    No growth at 48 Hours    Culture - Blood (collected 05 Sep 2023 05:55)  Source: .Blood Blood  Preliminary Report (07 Sep 2023 10:01):    No growth at 48 Hours    Culture - Urine (collected 04 Sep 2023 10:15)  Source: Clean Catch Clean Catch (Midstream)  Final Report (05 Sep 2023 16:39):    <10,000 CFU/mL Normal Urogenital Vikki    Culture - Blood (collected 04 Sep 2023 09:05)  Source: .Blood Blood  Preliminary Report (05 Sep 2023 15:01):    No growth at 24 hours    Culture - Blood (collected 04 Sep 2023 09:00)  Source: .Blood Blood  Gram Stain (05 Sep 2023 16:44):    Growth in anaerobic bottle: Gram Positive Cocci in Clusters    Growth in aerobic bottle: Gram Positive Cocci in Clusters  Final Report (06 Sep 2023 10:50):    Growth in aerobic and anaerobic bottles: Staphylococcus aureus    See previous culture [id number]    Culture - Blood (collected 03 Sep 2023 15:25)  Source: .Blood Blood-Peripheral  Gram Stain (04 Sep 2023 07:25):    Growth in aerobic and anaerobic bottles: Gram Positive Cocci in Clusters  Final Report (05 Sep 2023 15:38):    Growth in aerobic and anaerobic bottles: Staphylococcus aureus    See previous culture [id number]    Culture - Blood (collected 03 Sep 2023 15:15)  Source: .Blood Blood-Peripheral  Gram Stain (04 Sep 2023 08:01):    Growth in aerobic bottle: Gram Positive Cocci in Clusters    Growth in anaerobic bottle: Gram Positive Cocci in Clusters  Final Report (05 Sep 2023 15:37):    Growth in aerobic and anaerobic bottles: Staphylococcus aureus    Direct identification is available within approximately 3-5    hours either by Blood Panel Multiplexed PCR or Direct    MALDI-TOF. Details: [email or web address]  Organism: Blood Culture PCR  Staphylococcus aureus (05 Sep 2023 15:37)  Organism: Staphylococcus aureus (05 Sep 2023 15:37)      -  Clindamycin: S <=0.25      -  Oxacillin: S <=0.25 Oxacillin predicts susceptibility for dicloxacillin, methicillin, and nafcillin      -  Gentamicin: S <=1 Should not be used as monotherapy      -  Cefazolin: S <=4      -  Vancomycin: S 2      -  Tetracycline: S <=1      Method Type: RAMOS      -  Ampicillin/Sulbactam: S <=8/4      -  Rifampin: S <=1 Should not be used as monotherapy      -  Erythromycin: S <=0.25      -  Trimethoprim/Sulfamethoxazole: S <=0.5/9.5  Organism: Blood Culture PCR (05 Sep 2023 15:37)      Method Type: PCR      -  Methicillin SENSITIVE Staphylococcus aureus (MSSA): Detec Any isolate of Staphylococcus aureus from a blood culture is NOT considered a contaminant    RADIOLOGY & ADDITIONAL STUDIES:  ACC: [id number] EXAM:  US ABDOMEN RT UPR QUADRANT   ORDERED BY: BARAK CLARK DATE:  09/04/2023          INTERPRETATION:  CLINICAL INFORMATION: Abnormal liver function tests.    COMPARISON: None available.    TECHNIQUE: Sonography of the right upper quadrant.    FINDINGS:  Liver: Normal in size and echogenicity. No focal lesions identified.  Bile ducts: Normal caliber. Common bile duct measures 5 mm.  Gallbladder: Within normal limits. No stones or gallbladder wall   thickening. No sonographic Selby's sign.  Pancreas: Poorly visualized.  Right kidney: 6.5 cm. No hydronephrosis. Atrophic.  Ascites: None.  IVC: Visualized portions are within normal limits. Diffuse   atherosclerotic calcifications of the abdominal aorta.    IMPRESSION:  The liver is normal in size and echogenicity without focal lesions.    Atrophic right kidney.    ACC: [id number] EXAM:  US DPLX LWR EXT VEINS LTD LT   ORDERED BY: BEV RHODES     PROCEDURE DATE:  09/03/2023          INTERPRETATION:  CLINICAL INFORMATION: Redness and swelling of the lower   extremity    COMPARISON: None available.    TECHNIQUE:Duplex sonography of the LEFT LOWER extremity veins with color   and spectral Doppler, with and without compression.    FINDINGS:    There is normal compressibility of the left common femoral, femoral and   popliteal veins. The contralateral common femoral vein is patent.  Doppler examination shows normal spontaneous and phasic flow of the veins   above the knee.    However, thrombosis is seen of the left posterior tibial vein in the mid   calf where there is a perforating vein connected to the greater saphenous   vein and extends down into the posterior tibial vein.      IMPRESSION:  Acute deep venous thrombosis: below the knee.      TRANSTHORACIC ECHOCARDIOGRAM REPORT  ________________________________________________________________________________                                      _______       Pt. Name:       VASU CARBAJAL Study Date:    9/5/2023  MRN:            [id number]      YOB: 1930  Accession #:    [id number]      Age:           89 years  Account#:       [id number]     Gender:        F  Heart Rate:                    Height:        62.20 in (158.00 cm)  Rhythm:                        Weight:134.48 lb (61.00 kg)  Blood Pressure: 163/62 mmHg    BSA/BMI:       1.62 m² / 24.44 kg/m²  ________________________________________________________________________________________  Referring Physician:    [id number] Nelson Rich  Interpreting Physician: Marcelle Martinez  Primary Sonographer:    Nini Butler EMERSON    CPT:               ECHO TTE WO CON COMP W DOPP - 45413.m  Indication(s):     Acute and subacute infective endocarditis - I33.0  Procedure:         Transthoracic echocardiogram with 2-D, M-mode and complete                     spectral and color flow Doppler.  Ordering Location: [facility]    _______________________________________________________________________________________     CONCLUSIONS:      1. Left ventricular systolic function is normal with an ejection fraction of 65 % by Quinones's method of disks.   2. Normal right ventricular cavity size, normal right ventricular wall thickness and normal right ventricular systolic function.   3. TAVR appears well seated with grossly normal function.   4. Mild to moderate mitral regurgitation.   5. Mild tricuspid regurgitation.   6. 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    Assessment :  89 y/o f pmhx varicose veins, HTN, hypothyroid, colon cancer s/p resection 2002, osteoporosis, s/p tavr, presents w/ pain, erythema LLE, admitted with cellulitis, confirmed DVT.  Sepsis secondary to LLE Cellulitis w/ MSSA Bacteremia  LLE DVT  - erythema, pain, swelling LLE surrounding area of injection site. Patient had recent Varithena procedure (foam injectable for varicose veins)  - Blood cultures with MSSA  - Repeat blood cultures 9/5 neg   - hx of TVAR TTE ok     Plan:  Cont Ancef- adjust dosing to 1gram q12h - will need total 6 weeks till 10/16/23  Repeat blood cultures x 48 hours Can place PICC tmrw  Trend temps and cbc  Pulm toileting  Supportive care and additional management per primary team  Stable from ID standpoint    Continue with present regiment.  Appropriate use of antibiotics and adverse effects reviewed.      I have discussed the above plan of care with patient  in detail. She expressed understanding of the  treatment plan . Risks, benefits and alternatives discussed in detail. I have asked if she has any questions or concerns and appropriately addressed them to the best of my ability .    > 35 minutes were spent in direct patient care reviewing notes, medications ,labs data/ imaging , discussion with multidisciplinary team.    Thank you for allowing me to participate in care of your patient .    Timothy Marcus MD  Infectious Disease  [phone]

## 2023-09-07 NOTE — DISCHARGE NOTE NURSING/CASE MANAGEMENT/SOCIAL WORK - NSDCPEFALRISK_GEN_ALL_CORE
For information on Fall & Injury Prevention, visit: [email or web address] OR  [email or web address] OR  https://www.cdc.gov/steadi/patient.html

## 2023-09-08 LAB
BASOPHILS # BLD AUTO: 0.08 K/UL — SIGNIFICANT CHANGE UP (ref 0–0.2)
BASOPHILS NFR BLD AUTO: 0.6 % — SIGNIFICANT CHANGE UP (ref 0–2)
EOSINOPHIL # BLD AUTO: 0.94 K/UL — HIGH (ref 0–0.5)
EOSINOPHIL NFR BLD AUTO: 7.4 % — HIGH (ref 0–6)
HCT VFR BLD CALC: 30.3 % — LOW (ref 34.5–45)
HGB BLD-MCNC: 10.1 G/DL — LOW (ref 11.5–15.5)
IMM GRANULOCYTES NFR BLD AUTO: 5.9 % — HIGH (ref 0–0.9)
LYMPHOCYTES # BLD AUTO: 1.25 K/UL — SIGNIFICANT CHANGE UP (ref 1–3.3)
LYMPHOCYTES # BLD AUTO: 9.9 % — LOW (ref 13–44)
MCHC RBC-ENTMCNC: 29.4 PG — SIGNIFICANT CHANGE UP (ref 27–34)
MCHC RBC-ENTMCNC: 33.3 GM/DL — SIGNIFICANT CHANGE UP (ref 32–36)
MCV RBC AUTO: 88.3 FL — SIGNIFICANT CHANGE UP (ref 80–100)
MONOCYTES # BLD AUTO: 1.62 K/UL — HIGH (ref 0–0.9)
MONOCYTES NFR BLD AUTO: 12.8 % — SIGNIFICANT CHANGE UP (ref 2–14)
NEUTROPHILS # BLD AUTO: 8.02 K/UL — HIGH (ref 1.8–7.4)
NEUTROPHILS NFR BLD AUTO: 63.4 % — SIGNIFICANT CHANGE UP (ref 43–77)
NRBC # BLD: 0 /100 WBCS — SIGNIFICANT CHANGE UP (ref 0–0)
PLATELET # BLD AUTO: 143 K/UL — LOW (ref 150–400)
RBC # BLD: 3.43 M/UL — LOW (ref 3.8–5.2)
RBC # FLD: 16.3 % — HIGH (ref 10.3–14.5)
WBC # BLD: 12.66 K/UL — HIGH (ref 3.8–10.5)
WBC # FLD AUTO: 12.66 K/UL — HIGH (ref 3.8–10.5)

## 2023-09-08 PROCEDURE — 36573 INSJ PICC RS&I 5 YR+: CPT

## 2023-09-08 PROCEDURE — 99233 SBSQ HOSP IP/OBS HIGH 50: CPT | Mod: GC

## 2023-09-08 RX ORDER — SODIUM CHLORIDE 9 MG/ML
10 INJECTION INTRAMUSCULAR; INTRAVENOUS; SUBCUTANEOUS
Refills: 0 | Status: DISCONTINUED | OUTPATIENT
Start: 2023-09-08 | End: 2023-09-09

## 2023-09-08 RX ORDER — CHLORHEXIDINE GLUCONATE 213 G/1000ML
1 SOLUTION TOPICAL
Refills: 0 | Status: DISCONTINUED | OUTPATIENT
Start: 2023-09-08 | End: 2023-09-09

## 2023-09-08 RX ADMIN — Medication 100 MILLIGRAM(S): at 04:36

## 2023-09-08 RX ADMIN — Medication 1 APPLICATION(S): at 04:36

## 2023-09-08 RX ADMIN — Medication 1 APPLICATION(S): at 18:39

## 2023-09-08 RX ADMIN — Medication 100 MILLIGRAM(S): at 18:40

## 2023-09-08 RX ADMIN — ENOXAPARIN SODIUM 60 MILLIGRAM(S): 100 INJECTION SUBCUTANEOUS at 18:37

## 2023-09-08 RX ADMIN — Medication 81 MILLIGRAM(S): at 12:41

## 2023-09-08 RX ADMIN — PANTOPRAZOLE SODIUM 40 MILLIGRAM(S): 20 TABLET, DELAYED RELEASE ORAL at 12:40

## 2023-09-08 RX ADMIN — LOSARTAN POTASSIUM 100 MILLIGRAM(S): 100 TABLET, FILM COATED ORAL at 04:36

## 2023-09-08 RX ADMIN — Medication 100 MICROGRAM(S): at 04:36

## 2023-09-08 NOTE — PROGRESS NOTE ADULT - PROVIDER SPECIALTY LIST ADULT
Infectious Disease
Infectious Disease
Gastroenterology
Gastroenterology
Infectious Disease
Infectious Disease
Gastroenterology
Hospitalist
Gastroenterology
Hospitalist

## 2023-09-08 NOTE — PROGRESS NOTE ADULT - SUBJECTIVE AND OBJECTIVE BOX
[facility]  Cuco Parada PA-C  [address] [place] [address]  [phone]      INTERVAL HPI/OVERNIGHT EVENTS:  Pt s/e  No overt GI bleeding  No further GI complaints    MEDICATIONS  (STANDING):  ammonium lactate 12% Lotion 1 Application(s) Topical two times a day  aspirin  chewable 81 milliGRAM(s) Oral daily  ceFAZolin   IVPB 1000 milliGRAM(s) IV Intermittent every 12 hours  enoxaparin Injectable 60 milliGRAM(s) SubCutaneous every 12 hours  influenza  Vaccine (HIGH DOSE) 0.7 milliLiter(s) IntraMuscular once  levothyroxine 100 MICROGram(s) Oral daily  losartan 100 milliGRAM(s) Oral daily  pantoprazole    Tablet 40 milliGRAM(s) Oral daily  PreserVision Areds 2 2 Tablet(s) 1 Capsule(s) Oral two times a day    MEDICATIONS  (PRN):  acetaminophen     Tablet .. 650 milliGRAM(s) Oral every 6 hours PRN Temp greater or equal to 38C (100.4F), Mild Pain (1 - 3)  aluminum hydroxide/magnesium hydroxide/simethicone Suspension 30 milliLiter(s) Oral every 4 hours PRN Dyspepsia  melatonin 3 milliGRAM(s) Oral at bedtime PRN Insomnia  ondansetron Injectable 4 milliGRAM(s) IV Push every 8 hours PRN Nausea and/or Vomiting      Allergies    No Known Allergies    Intolerances    morphine (Nausea)      PHYSICAL EXAM:   Vital Signs:  Vital Signs Last 24 Hrs  T(C): 36.7 (08 Sep 2023 05:02), Max: 36.7 (08 Sep 2023 05:02)  T(F): 98.1 (08 Sep 2023 05:02), Max: 98.1 (08 Sep 2023 05:02)  HR: 61 (08 Sep 2023 05:02) (61 - 76)  BP: 155/57 (08 Sep 2023 05:02) (137/60 - 155/57)  BP(mean): --  RR: 18 (08 Sep 2023 05:02) (17 - 18)  SpO2: 93% (08 Sep 2023 05:02) (93% - 95%)    Parameters below as of 08 Sep 2023 05:02  Patient On (Oxygen Delivery Method): room air      Daily     Daily Weight in k (08 Sep 2023 05:02)    GENERAL:  Appears stated age  HEENT:  NC/AT  CHEST:  Full & symmetric excursion  HEART:  Regular rhythm  ABDOMEN:  Soft, non-tender, non-distended  EXTEREMITIES:  no cyanosis  SKIN:  No rash  NEURO:  Alert      LABS:                        10.1   12.66 )-----------( 143      ( 08 Sep 2023 06:27 )             30.3     09-    138  |  108  |  28<H>  ----------------------------<  100<H>  4.9   |  23  |  1.10    Ca    8.5      07 Sep 2023 06:10        Urinalysis Basic - ( 07 Sep 2023 06:10 )    Color: x / Appearance: x / SG: x / pH: x  Gluc: 100 mg/dL / Ketone: x  / Bili: x / Urobili: x   Blood: x / Protein: x / Nitrite: x   Leuk Esterase: x / RBC: x / WBC x   Sq Epi: x / Non Sq Epi: x / Bacteria: x

## 2023-09-08 NOTE — PROGRESS NOTE ADULT - ASSESSMENT
89 y/o f pmhx varicose veins, HTN, hypothyroid, colon cancer s/p resection 2002, osteoporosis, s/p tavr, presents w/ pain, erythema LLE, admitted with cellulitis, confirmed DVT.    1) Sepsis poa  secondary to LLE Cellulitis /eczema .   - erythema, pain, swelling LLE surrounding area of injection site. Patient had Varithena procedure on Monday (foam injectable for varicose veins)  - Blood cultures with MSSA  Bacteremic   -Continue Ancef 1gm q12 hr ,  will need total 6 weeks,- ECHO no vegetation    Repeat blood cultures - neg x 48 hours - place PICC today         2) LLE DVT   US LLE: thrombosis is seen of the left posterior tibial vein in the mid calf where there is a perforating vein connected to the greater saphenous vein and extends down into the posterior tibial vein.  - This appears to be a provoked DVT in the setting of Varithena  -Stop heparin gtt due to anemia  s/p pRBC  H/H stable so resumed Lovenox full ac  will switch to Eliquis at dc   -Below knee DVT- repeat doppler There is persistent DVT affecting a left posterior tibial vein without   proximal propagation.    3) iron deficiency anemia -  - FOBT negative   and s/p 1 PRBC - GI Consulted  dr piedra  hx colon ca first   Risks of holding a/c outweigh benefits-; monitor closely for GIB but no gi bleed  so ok to start blood thinner     4) Renal Insufficiency   - presents w/ Cr 1.6   Avoid nephrotoxic medications, now  cr 1.1      5) Hyponatremia.   Suspect hypovolemic hyponatremia 2/2 decreased PO intake  - Improved with IVF , Encourage for oral intake     6) Transaminitis  - RUQ US- liver is normal in size and echogenicity without focal lesions.    Atrophic right kidney.  - trend enzymes transaminitis  improved     7) HTN (hypertension).   on irbesartan,  resumed - bp stable .     8) Hypothyroid.   - continue home synthroid.    9) S/P TAVR (transcatheter aortic valve replacement).   - continue home asa.  - TTE to r/o Endocarditis -. Left ventricular systolic function is normal with an ejection fraction of 65 % by Quinones's method of disks.   2. Normal right ventricular cavity size, normal right ventricular wall thickness and normal right ventricular systolic function.   3. TAVR appears well seated with grossly normal function.   4. Mild to moderate mitral regurgitation.   5. Mild tricuspid regurgitation.    DVT Prophylaxis -- started on  Lovenox 60 mg sc bid for dvt    89 y/o f pmhx varicose veins, HTN, hypothyroid, colon cancer s/p resection 2002, osteoporosis, s/p tavr, presents w/ pain, erythema LLE, admitted with cellulitis, confirmed DVT.    1) Sepsis poa  secondary to LLE Cellulitis /eczema -  likely related recent Varithena procedure   - erythema, pain, swelling LLE surrounding area of injection site. Patient had Varithena procedure on Monday (foam injectable for varicose veins)  - Blood cultures with MSSA  Bacteremic   -Continue Ancef 1gm q12 hr ,  will need total 6 weeks,- ECHO no vegetation    Repeat blood cultures - neg x 48 hours - place PICC today         2) LLE DVT   US LLE: thrombosis is seen of the left posterior tibial vein in the mid calf where there is a perforating vein connected to the greater saphenous vein and extends down into the posterior tibial vein.  - This appears to be a provoked DVT in the setting of Varithena  -Stop heparin gtt due to anemia  s/p pRBC  H/H stable so resumed Lovenox full ac  will switch to Eliquis at dc   -Below knee DVT- repeat doppler There is persistent DVT affecting a left posterior tibial vein without   proximal propagation.    3) iron deficiency anemia -  - FOBT negative   and s/p 1 PRBC - GI Consulted  dr piedra  hx colon ca first   Risks of holding a/c outweigh benefits-; monitor closely for GIB but no gi bleed  so ok to start blood thinner     4) Renal Insufficiency   - presents w/ Cr 1.6   Avoid nephrotoxic medications, now  cr 1.1      5) Hyponatremia.   Suspect hypovolemic hyponatremia 2/2 decreased PO intake  - Improved with IVF , Encourage for oral intake     6) Transaminitis  - RUQ US- liver is normal in size and echogenicity without focal lesions.    Atrophic right kidney.  - trend enzymes transaminitis  improved     7) HTN (hypertension).   on irbesartan,  resumed - bp stable .     8) Hypothyroid.   - continue home synthroid.    9) S/P TAVR (transcatheter aortic valve replacement).   - continue home asa.  - TTE to r/o Endocarditis -. Left ventricular systolic function is normal with an ejection fraction of 65 % by Quinones's method of disks.   2. Normal right ventricular cavity size, normal right ventricular wall thickness and normal right ventricular systolic function.   3. TAVR appears well seated with grossly normal function.   4. Mild to moderate mitral regurgitation.   5. Mild tricuspid regurgitation.    DVT Prophylaxis -- started on  Lovenox 60 mg sc bid for dvt

## 2023-09-08 NOTE — PROGRESS NOTE ADULT - REASON FOR ADMISSION
DVT / cellulitis

## 2023-09-08 NOTE — PROGRESS NOTE ADULT - SUBJECTIVE AND OBJECTIVE BOX
VASU CARBAJAL is a 89yFemale , patient examined and chart reviewed.    INTERVAL HPI/ OVERNIGHT EVENTS:   NAD. Afebrile.  In chair. No events.  PICC line placed.    PAST MEDICAL & SURGICAL HISTORY:  HTN (hypertension)  H/O osteoporosis  Hypothyroid  History of colon cancer  S/P TAVR (transcatheter aortic valve replacement)  S/P colon resection  S/P knee replacement  S/P cataract surgery      For details regarding the patient's social history, family history, and other miscellaneous elements, please refer the initial infectious diseases consultation and/or the admitting history and physical examination for this admission.    ROS:  CONSTITUTIONAL:  Negative fever or chills  EYES:  Negative  blurry vision or double vision  CARDIOVASCULAR:  Negative for chest pain or palpitations  RESPIRATORY:  Negative for cough, wheezing, or SOB   GASTROINTESTINAL:  Negative for nausea, vomiting, diarrhea, constipation, or abdominal pain  GENITOURINARY:  Negative frequency, urgency or dysuria  NEUROLOGIC:  No headache, confusion, dizziness, lightheadedness  All other systems were reviewed and are negative     ALLERGIES  morphine (Nausea)  No Known Allergies      Current inpatient medications :    ANTIBIOTICS/RELEVANT:  ceFAZolin   IVPB 2000 milliGRAM(s) IV Intermittent every 12 hours    MEDICATIONS  (STANDING):  ammonium lactate 12% Lotion 1 Application(s) Topical two times a day  aspirin  chewable 81 milliGRAM(s) Oral daily  chlorhexidine 4% Liquid 1 Application(s) Topical <User Schedule>  enoxaparin Injectable 60 milliGRAM(s) SubCutaneous every 12 hours  influenza  Vaccine (HIGH DOSE) 0.7 milliLiter(s) IntraMuscular once  levothyroxine 100 MICROGram(s) Oral daily  losartan 100 milliGRAM(s) Oral daily  pantoprazole    Tablet 40 milliGRAM(s) Oral daily  PreserVision Areds 2 2 Tablet(s) 1 Capsule(s) Oral two times a day    MEDICATIONS  (PRN):  acetaminophen     Tablet .. 650 milliGRAM(s) Oral every 6 hours PRN Temp greater or equal to 38C (100.4F), Mild Pain (1 - 3)  aluminum hydroxide/magnesium hydroxide/simethicone Suspension 30 milliLiter(s) Oral every 4 hours PRN Dyspepsia  melatonin 3 milliGRAM(s) Oral at bedtime PRN Insomnia  ondansetron Injectable 4 milliGRAM(s) IV Push every 8 hours PRN Nausea and/or Vomiting  sodium chloride 0.9% lock flush 10 milliLiter(s) IV Push every 1 hour PRN Pre/post blood products, medications, blood draw, and to maintain line patency      Objective:  Vital Signs Last 24 Hrs  T(C): 36.8 (08 Sep 2023 13:19), Max: 36.8 (08 Sep 2023 13:19)  T(F): 98.2 (08 Sep 2023 13:19), Max: 98.2 (08 Sep 2023 13:19)  HR: 65 (08 Sep 2023 13:19) (61 - 76)  BP: 145/51 (08 Sep 2023 13:19) (137/60 - 155/57)  RR: 18 (08 Sep 2023 13:19) (17 - 18)  SpO2: 92% (08 Sep 2023 13:19) (92% - 95%)    Parameters below as of 08 Sep 2023 13:19  Patient On (Oxygen Delivery Method): room air      Physical Exam:  General:  no acute distress  Neck: supple, trachea midline  Lungs: clear, no wheeze/rhonchi  Cardiovascular: regular rate and rhythm, S1 S2  Abdomen: soft, nontender,  bowel sounds normal  Neurological: alert and oriented x3  Skin: no rash  Extremities: Left LE erythema swelling improving      LABS:                        10.1   12.66 )-----------( 143      ( 08 Sep 2023 06:27 )             30.3   09-07    138  |  108  |  28<H>  ----------------------------<  100<H>  4.9   |  23  |  1.10    Ca    8.5      07 Sep 2023 06:10      MICROBIOLOGY:    Culture - Blood (collected 05 Sep 2023 05:59)  Source: .Blood Blood  Preliminary Report (07 Sep 2023 10:01):    No growth at 48 Hours    Culture - Blood (collected 05 Sep 2023 05:55)  Source: .Blood Blood  Preliminary Report (07 Sep 2023 10:01):    No growth at 48 Hours    Culture - Urine (collected 04 Sep 2023 10:15)  Source: Clean Catch Clean Catch (Midstream)  Final Report (05 Sep 2023 16:39):    <10,000 CFU/mL Normal Urogenital Vikki    Culture - Blood (collected 04 Sep 2023 09:05)  Source: .Blood Blood  Preliminary Report (05 Sep 2023 15:01):    No growth at 24 hours    Culture - Blood (collected 04 Sep 2023 09:00)  Source: .Blood Blood  Gram Stain (05 Sep 2023 16:44):    Growth in anaerobic bottle: Gram Positive Cocci in Clusters    Growth in aerobic bottle: Gram Positive Cocci in Clusters  Final Report (06 Sep 2023 10:50):    Growth in aerobic and anaerobic bottles: Staphylococcus aureus    See previous culture [id number]    Culture - Blood (collected 03 Sep 2023 15:25)  Source: .Blood Blood-Peripheral  Gram Stain (04 Sep 2023 07:25):    Growth in aerobic and anaerobic bottles: Gram Positive Cocci in Clusters  Final Report (05 Sep 2023 15:38):    Growth in aerobic and anaerobic bottles: Staphylococcus aureus    See previous culture [id number]    Culture - Blood (collected 03 Sep 2023 15:15)  Source: .Blood Blood-Peripheral  Gram Stain (04 Sep 2023 08:01):    Growth in aerobic bottle: Gram Positive Cocci in Clusters    Growth in anaerobic bottle: Gram Positive Cocci in Clusters  Final Report (05 Sep 2023 15:37):    Growth in aerobic and anaerobic bottles: Staphylococcus aureus    Direct identification is available within approximately 3-5    hours either by Blood Panel Multiplexed PCR or Direct    MALDI-TOF. Details: [email or web address]  Organism: Blood Culture PCR  Staphylococcus aureus (05 Sep 2023 15:37)  Organism: Staphylococcus aureus (05 Sep 2023 15:37)      -  Clindamycin: S <=0.25      -  Oxacillin: S <=0.25 Oxacillin predicts susceptibility for dicloxacillin, methicillin, and nafcillin      -  Gentamicin: S <=1 Should not be used as monotherapy      -  Cefazolin: S <=4      -  Vancomycin: S 2      -  Tetracycline: S <=1      Method Type: RAMOS      -  Ampicillin/Sulbactam: S <=8/4      -  Rifampin: S <=1 Should not be used as monotherapy      -  Erythromycin: S <=0.25      -  Trimethoprim/Sulfamethoxazole: S <=0.5/9.5  Organism: Blood Culture PCR (05 Sep 2023 15:37)      Method Type: PCR      -  Methicillin SENSITIVE Staphylococcus aureus (MSSA): Detec Any isolate of Staphylococcus aureus from a blood culture is NOT considered a contaminant    RADIOLOGY & ADDITIONAL STUDIES:  ACC: [id number] EXAM:  US ABDOMEN RT UPR QUADRANT   ORDERED BY: BARAK HAWKINS     PROCEDURE DATE:  09/04/2023          INTERPRETATION:  CLINICAL INFORMATION: Abnormal liver function tests.    COMPARISON: None available.    TECHNIQUE: Sonography of the right upper quadrant.    FINDINGS:  Liver: Normal in size and echogenicity. No focal lesions identified.  Bile ducts: Normal caliber. Common bile duct measures 5 mm.  Gallbladder: Within normal limits. No stones or gallbladder wall   thickening. No sonographic Selby's sign.  Pancreas: Poorly visualized.  Right kidney: 6.5 cm. No hydronephrosis. Atrophic.  Ascites: None.  IVC: Visualized portions are within normal limits. Diffuse   atherosclerotic calcifications of the abdominal aorta.    IMPRESSION:  The liver is normal in size and echogenicity without focal lesions.    Atrophic right kidney.    ACC: [id number] EXAM:  US DPLX LWR EXT VEINS LTD LT   ORDERED BY: BEV RHODES     PROCEDURE DATE:  09/03/2023          INTERPRETATION:  CLINICAL INFORMATION: Redness and swelling of the lower   extremity    COMPARISON: None available.    TECHNIQUE:Duplex sonography of the LEFT LOWER extremity veins with color   and spectral Doppler, with and without compression.    FINDINGS:    There is normal compressibility of the left common femoral, femoral and   popliteal veins. The contralateral common femoral vein is patent.  Doppler examination shows normal spontaneous and phasic flow of the veins   above the knee.    However, thrombosis is seen of the left posterior tibial vein in the mid   calf where there is a perforating vein connected to the greater saphenous   vein and extends down into the posterior tibial vein.      IMPRESSION:  Acute deep venous thrombosis: below the knee.      TRANSTHORACIC ECHOCARDIOGRAM REPORT  ________________________________________________________________________________                                      _______       Pt. Name:       VASU CARBAJAL Study Date:    9/5/2023  MRN:            [id number]      YOB: 1930  Accession #:    [id number]      Age:           89 years  Account#:       [id number]     Gender:        F  Heart Rate:                    Height:        62.20 in (158.00 cm)  Rhythm:                        Weight:134.48 lb (61.00 kg)  Blood Pressure: 163/62 mmHg    BSA/BMI:       1.62 m² / 24.44 kg/m²  ________________________________________________________________________________________  Referring Physician:    [id number] Nelson Rich  Interpreting Physician: Marcelle Martinez  Primary Sonographer:    Nini Butler RDCS    CPT:               ECHO TTE WO CON COMP W DOPP - 31299.m  Indication(s):     Acute and subacute infective endocarditis - I33.0  Procedure:         Transthoracic echocardiogram with 2-D, M-mode and complete                     spectral and color flow Doppler.  Ordering Location: [facility]    _______________________________________________________________________________________     CONCLUSIONS:      1. Left ventricular systolic function is normal with an ejection fraction of 65 % by Quinones's method of disks.   2. Normal right ventricular cavity size, normal right ventricular wall thickness and normal right ventricular systolic function.   3. TAVR appears well seated with grossly normal function.   4. Mild to moderate mitral regurgitation.   5. Mild tricuspid regurgitation.   6. 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    Assessment :  89 y/o f pmhx varicose veins, HTN, hypothyroid, colon cancer s/p resection 2002, osteoporosis, s/p tavr, presents w/ pain, erythema LLE, admitted with cellulitis, confirmed DVT.  Sepsis secondary to LLE Cellulitis w/ MSSA Bacteremia  LLE DVT  - erythema, pain, swelling LLE surrounding area of injection site. Patient had recent Varithena procedure (foam injectable for varicose veins)  - Blood cultures with MSSA  - Repeat blood cultures 9/5 neg   - hx of TVAR TTE ok   - PICC placed 9/8    Plan:  Cont Ancef 1gram q12h total 6 weeks till 10/16/23 ( [facility] )  Repeat blood cultures NGTD  PICC placed 9/8  Trend temps and cbc  Pulm toileting  Supportive care and additional management per primary team  Stable from ID standpoint    Continue with present regiment.  Appropriate use of antibiotics and adverse effects reviewed.      I have discussed the above plan of care with patient  in detail. She expressed understanding of the  treatment plan . Risks, benefits and alternatives discussed in detail. I have asked if she has any questions or concerns and appropriately addressed them to the best of my ability .    > 35 minutes were spent in direct patient care reviewing notes, medications ,labs data/ imaging , discussion with multidisciplinary team.    Thank you for allowing me to participate in care of your patient .    Timothy Marcus MD  Infectious Disease  [phone]

## 2023-09-08 NOTE — PROGRESS NOTE ADULT - TIME BILLING
pt seen and examine today see above plan -  MSSA  Bacteremic  cause sepsis poa   leucocytosis resolving , no fever   -on  Ancef 1gm q12 hr ,  will need total 6 weeks Repeat blood cultures  neg x 48 hours  place PICC   today  dc  plan  tomorrow with home care .

## 2023-09-08 NOTE — PROGRESS NOTE ADULT - SUBJECTIVE AND OBJECTIVE BOX
Patient is a 89y old  Female who presents with a chief complaint of DVT / cellulitis (08 Sep 2023 11:16)    pt seen and examine today alert awake , no fever , tolerating po , oob.   INTERVAL HPI/OVERNIGHT EVENTS:     T(C): 36.7 (09-08-23 @ 05:02), Max: 36.7 (09-08-23 @ 05:02)  HR: 61 (09-08-23 @ 05:02) (61 - 76)  BP: 155/57 (09-08-23 @ 05:02) (137/60 - 155/57)  RR: 18 (09-08-23 @ 05:02) (17 - 18)  SpO2: 93% (09-08-23 @ 05:02) (93% - 95%)  Wt(kg): --  I&O's Summary    REVIEW OF SYSTEMS:  CONSTITUTIONAL: No fever, weight loss,  + fatigue  EYES: No eye pain, visual disturbances, or discharge  ENMT:  No difficulty hearing, tinnitus, vertigo; No sinus or throat pain  NECK: No pain or stiffness  BREASTS: No pain, no masses  RESPIRATORY: No cough, wheezing, chills  No shortness of breath  CARDIOVASCULAR: No chest pain, palpitations, dizziness, or leg swelling  GASTROINTESTINAL: No abdominal or epigastric pain. No nausea, vomiting,  No diarrhea or constipation.  GENITOURINARY: No dysuria, frequency, hematuria, or incontinence  NEUROLOGICAL: No headaches, memory loss, loss of strength, numbness, or tremors  SKIN: No itching, burning, rashes, or lesions   MUSCULOSKELETAL: No joint pain or swelling; No muscle, back, or extremity pain    PHYSICAL EXAM:  GENERAL: NAD,  HEAD:  Atraumatic, Normocephalic  EYES: EOMI, PERRLA, conjunctiva and sclera clear  ENMT: t; Moist mucous membranes  NECK: Supple, No JVD  NERVOUS SYSTEM:  Alert & Oriented X3; Motor Strength 5/5 B/L upper and lower extremities; DTRs 2+ intact and symmetric  CHEST/LUNG:  percussion bilaterally; No rales, rhonchi, wheezing,   HEART: Regular rate and rhythm; No murmurs,  ABDOMEN: Soft, Nontender, Nondistended; Bowel sounds present  EXTREMITIES:  2+ Peripheral Pulses, No clubbing, cyanosis, or edema  SKIN: No rashes or lesions + lt leg redness  dryness eczema     MEDICATIONS  (STANDING):  ammonium lactate 12% Lotion 1 Application(s) Topical two times a day  aspirin  chewable 81 milliGRAM(s) Oral daily  ceFAZolin   IVPB 1000 milliGRAM(s) IV Intermittent every 12 hours  chlorhexidine 4% Liquid 1 Application(s) Topical <User Schedule>  enoxaparin Injectable 60 milliGRAM(s) SubCutaneous every 12 hours  influenza  Vaccine (HIGH DOSE) 0.7 milliLiter(s) IntraMuscular once  levothyroxine 100 MICROGram(s) Oral daily  losartan 100 milliGRAM(s) Oral daily  pantoprazole    Tablet 40 milliGRAM(s) Oral daily  PreserVision Areds 2 2 Tablet(s) 1 Capsule(s) Oral two times a day    MEDICATIONS  (PRN):  acetaminophen     Tablet .. 650 milliGRAM(s) Oral every 6 hours PRN Temp greater or equal to 38C (100.4F), Mild Pain (1 - 3)  aluminum hydroxide/magnesium hydroxide/simethicone Suspension 30 milliLiter(s) Oral every 4 hours PRN Dyspepsia  melatonin 3 milliGRAM(s) Oral at bedtime PRN Insomnia  ondansetron Injectable 4 milliGRAM(s) IV Push every 8 hours PRN Nausea and/or Vomiting  sodium chloride 0.9% lock flush 10 milliLiter(s) IV Push every 1 hour PRN Pre/post blood products, medications, blood draw, and to maintain line patency      LABS:                        10.1   12.66 )-----------( 143      ( 08 Sep 2023 06:27 )             30.3     09-07    138  |  108  |  28<H>  ----------------------------<  100<H>  4.9   |  23  |  1.10    Ca    8.5      07 Sep 2023 06:10        Urinalysis Basic - ( 07 Sep 2023 06:10 )    Color: x / Appearance: x / SG: x / pH: x  Gluc: 100 mg/dL / Ketone: x  / Bili: x / Urobili: x   Blood: x / Protein: x / Nitrite: x   Leuk Esterase: x / RBC: x / WBC x   Sq Epi: x / Non Sq Epi: x / Bacteria: x                09-05 @ 05:59   No growth at 72 Hours  --  --  09-05 @ 05:55   No growth at 72 Hours  --  --          RADIOLOGY & ADDITIONAL TESTS:    Imaging Personally Reviewed:   no new test     Advance Directives: full code       Palliative Care:  Appropriate

## 2023-09-08 NOTE — PROGRESS NOTE ADULT - ASSESSMENT
Anemia  Acute DVT    Hgb noted, stable  Trend cbc  Transfuse prn  PPI for ppx  FOBT negative  Anti-emetics prn  Risks of holding a/c outweigh benefits; monitor closely for GIB on a/c  No GI objection to d/c  D/w pt    I reviewed the overnight course of events on the unit, re-confirming the patient history. I discussed the care with the patient and their family  Differential diagnosis and plan of care discussed with patient after the evaluation  40 minutes spent on total encounter of which more than fifty percent of the encounter was spent counseling and/or coordinating care by the attending physician.  Advanced care planning was discussed with patient and family.  Advanced care planning forms were reviewed and discussed.  Risks, benefits and alternatives of gastroenterologic procedures were discussed in detail and all questions were answered.

## 2023-09-08 NOTE — PROCEDURE NOTE - PROCEDURE FINDINGS AND DETAILS
40cm bard single lumen power picc inserted into patent right basilic vein under sterile conditions and image guidance.  Tip is in the SVC.  PICC can be accessed.

## 2023-09-09 ENCOUNTER — TRANSCRIPTION ENCOUNTER (OUTPATIENT)
Age: 88
End: 2023-09-09

## 2023-09-09 VITALS
TEMPERATURE: 98 F | OXYGEN SATURATION: 92 % | SYSTOLIC BLOOD PRESSURE: 136 MMHG | DIASTOLIC BLOOD PRESSURE: 71 MMHG | RESPIRATION RATE: 17 BRPM | HEART RATE: 65 BPM

## 2023-09-09 LAB
CULTURE RESULTS: SIGNIFICANT CHANGE UP
SPECIMEN SOURCE: SIGNIFICANT CHANGE UP

## 2023-09-09 PROCEDURE — 82728 ASSAY OF FERRITIN: CPT

## 2023-09-09 PROCEDURE — 80048 BASIC METABOLIC PNL TOTAL CA: CPT

## 2023-09-09 PROCEDURE — 36430 TRANSFUSION BLD/BLD COMPNT: CPT

## 2023-09-09 PROCEDURE — 87077 CULTURE AEROBIC IDENTIFY: CPT

## 2023-09-09 PROCEDURE — 84466 ASSAY OF TRANSFERRIN: CPT

## 2023-09-09 PROCEDURE — 82746 ASSAY OF FOLIC ACID SERUM: CPT

## 2023-09-09 PROCEDURE — 86901 BLOOD TYPING SEROLOGIC RH(D): CPT

## 2023-09-09 PROCEDURE — 84443 ASSAY THYROID STIM HORMONE: CPT

## 2023-09-09 PROCEDURE — 83605 ASSAY OF LACTIC ACID: CPT

## 2023-09-09 PROCEDURE — 82570 ASSAY OF URINE CREATININE: CPT

## 2023-09-09 PROCEDURE — P9016: CPT

## 2023-09-09 PROCEDURE — 96375 TX/PRO/DX INJ NEW DRUG ADDON: CPT

## 2023-09-09 PROCEDURE — 87086 URINE CULTURE/COLONY COUNT: CPT

## 2023-09-09 PROCEDURE — 87186 SC STD MICRODIL/AGAR DIL: CPT

## 2023-09-09 PROCEDURE — 76937 US GUIDE VASCULAR ACCESS: CPT

## 2023-09-09 PROCEDURE — C1751: CPT

## 2023-09-09 PROCEDURE — 85027 COMPLETE CBC AUTOMATED: CPT

## 2023-09-09 PROCEDURE — 36573 INSJ PICC RS&I 5 YR+: CPT

## 2023-09-09 PROCEDURE — 83930 ASSAY OF BLOOD OSMOLALITY: CPT

## 2023-09-09 PROCEDURE — 81001 URINALYSIS AUTO W/SCOPE: CPT

## 2023-09-09 PROCEDURE — 80202 ASSAY OF VANCOMYCIN: CPT

## 2023-09-09 PROCEDURE — 84540 ASSAY OF URINE/UREA-N: CPT

## 2023-09-09 PROCEDURE — 82272 OCCULT BLD FECES 1-3 TESTS: CPT

## 2023-09-09 PROCEDURE — 85730 THROMBOPLASTIN TIME PARTIAL: CPT

## 2023-09-09 PROCEDURE — 93306 TTE W/DOPPLER COMPLETE: CPT

## 2023-09-09 PROCEDURE — 87637 SARSCOV2&INF A&B&RSV AMP PRB: CPT

## 2023-09-09 PROCEDURE — 97530 THERAPEUTIC ACTIVITIES: CPT

## 2023-09-09 PROCEDURE — 76882 US LMTD JT/FCL EVL NVASC XTR: CPT

## 2023-09-09 PROCEDURE — 87150 DNA/RNA AMPLIFIED PROBE: CPT

## 2023-09-09 PROCEDURE — 80053 COMPREHEN METABOLIC PANEL: CPT

## 2023-09-09 PROCEDURE — 97116 GAIT TRAINING THERAPY: CPT

## 2023-09-09 PROCEDURE — 97161 PT EVAL LOW COMPLEX 20 MIN: CPT

## 2023-09-09 PROCEDURE — 83550 IRON BINDING TEST: CPT

## 2023-09-09 PROCEDURE — 85025 COMPLETE CBC W/AUTO DIFF WBC: CPT

## 2023-09-09 PROCEDURE — 83935 ASSAY OF URINE OSMOLALITY: CPT

## 2023-09-09 PROCEDURE — 85610 PROTHROMBIN TIME: CPT

## 2023-09-09 PROCEDURE — 83735 ASSAY OF MAGNESIUM: CPT

## 2023-09-09 PROCEDURE — 86923 COMPATIBILITY TEST ELECTRIC: CPT

## 2023-09-09 PROCEDURE — 84300 ASSAY OF URINE SODIUM: CPT

## 2023-09-09 PROCEDURE — 86850 RBC ANTIBODY SCREEN: CPT

## 2023-09-09 PROCEDURE — 96365 THER/PROPH/DIAG IV INF INIT: CPT

## 2023-09-09 PROCEDURE — 84156 ASSAY OF PROTEIN URINE: CPT

## 2023-09-09 PROCEDURE — 76705 ECHO EXAM OF ABDOMEN: CPT

## 2023-09-09 PROCEDURE — 83540 ASSAY OF IRON: CPT

## 2023-09-09 PROCEDURE — 86900 BLOOD TYPING SEROLOGIC ABO: CPT

## 2023-09-09 PROCEDURE — 87040 BLOOD CULTURE FOR BACTERIA: CPT

## 2023-09-09 PROCEDURE — 93971 EXTREMITY STUDY: CPT

## 2023-09-09 PROCEDURE — 99239 HOSP IP/OBS DSCHRG MGMT >30: CPT | Mod: GC

## 2023-09-09 PROCEDURE — 77001 FLUOROGUIDE FOR VEIN DEVICE: CPT

## 2023-09-09 PROCEDURE — 84133 ASSAY OF URINE POTASSIUM: CPT

## 2023-09-09 PROCEDURE — 71045 X-RAY EXAM CHEST 1 VIEW: CPT

## 2023-09-09 PROCEDURE — 36415 COLL VENOUS BLD VENIPUNCTURE: CPT

## 2023-09-09 PROCEDURE — 96376 TX/PRO/DX INJ SAME DRUG ADON: CPT

## 2023-09-09 PROCEDURE — 93005 ELECTROCARDIOGRAM TRACING: CPT

## 2023-09-09 PROCEDURE — 82607 VITAMIN B-12: CPT

## 2023-09-09 PROCEDURE — 99285 EMERGENCY DEPT VISIT HI MDM: CPT

## 2023-09-09 RX ORDER — APIXABAN 2.5 MG/1
2 TABLET, FILM COATED ORAL
Qty: 74 | Refills: 0
Start: 2023-09-09 | End: 2023-09-15

## 2023-09-09 RX ORDER — CEFAZOLIN SODIUM 1 G
1 VIAL (EA) INJECTION
Qty: 0 | Refills: 0 | DISCHARGE
Start: 2023-09-09

## 2023-09-09 RX ORDER — APIXABAN 2.5 MG/1
2 TABLET, FILM COATED ORAL
Qty: 14 | Refills: 0
Start: 2023-09-09 | End: 2023-09-15

## 2023-09-09 RX ADMIN — Medication 1 APPLICATION(S): at 05:57

## 2023-09-09 RX ADMIN — LOSARTAN POTASSIUM 100 MILLIGRAM(S): 100 TABLET, FILM COATED ORAL at 05:52

## 2023-09-09 RX ADMIN — Medication 81 MILLIGRAM(S): at 11:53

## 2023-09-09 RX ADMIN — Medication 100 MILLIGRAM(S): at 05:55

## 2023-09-09 RX ADMIN — PANTOPRAZOLE SODIUM 40 MILLIGRAM(S): 20 TABLET, DELAYED RELEASE ORAL at 11:53

## 2023-09-09 RX ADMIN — ENOXAPARIN SODIUM 60 MILLIGRAM(S): 100 INJECTION SUBCUTANEOUS at 05:52

## 2023-09-09 RX ADMIN — Medication 100 MICROGRAM(S): at 05:52

## 2023-09-09 RX ADMIN — CHLORHEXIDINE GLUCONATE 1 APPLICATION(S): 213 SOLUTION TOPICAL at 05:52

## 2023-09-09 NOTE — DISCHARGE NOTE PROVIDER - PROVIDER TOKENS
PROVIDER:[TOKEN:[[id number]]],PROVIDER:[TOKEN:[[id number]]] PROVIDER:[TOKEN:[[id number]]],PROVIDER:[TOKEN:[[id number]]],PROVIDER:[TOKEN:[[id number]]]

## 2023-09-09 NOTE — DISCHARGE NOTE PROVIDER - CARE PROVIDER_API CALL
MARY ANG, OMEGA FIGUEROA  [address]  [place] [address]  Phone: [phone]  Fax: [phone]  Follow Up Time:     Jona Wellington  Endocrinology/Metab/Diabetes  [address]  [place] [address]  Phone: [phone]  Fax: [phone]  Follow Up Time:    MARY ANG, OMEGA FIGUEROA  [address]  [place] [address]  Phone: [phone]  Fax: [phone]  Follow Up Time:     Jona Wellington  Endocrinology/Metab/Diabetes  [address]  [place] [address]  Phone: [phone]  Fax: [phone]  Follow Up Time:     Timothy Marcus  Infectious Disease  [address], [address]  [place] [address]  Phone: [phone]  Fax: [phone]  Follow Up Time:

## 2023-09-09 NOTE — DISCHARGE NOTE PROVIDER - NSDCFUADDAPPT_GEN_ALL_CORE_FT
Please follow with your primary medical doctor Dr. Bullard for DVT management.  Please follow with your primary medical doctor Dr. Bullard for DVT management.  repeat your wbc  in 3 to 4 days.

## 2023-09-09 NOTE — DISCHARGE NOTE PROVIDER - CARE PROVIDERS DIRECT ADDRESSES
,DirectAddress_Unknown,[email or web address] ,DirectAddress_Unknown,[email or web address],DirectAddress_Unknown

## 2023-09-09 NOTE — DISCHARGE NOTE PROVIDER - NSDCCPCAREPLAN_GEN_ALL_CORE_FT
PRINCIPAL DISCHARGE DIAGNOSIS  Diagnosis: Cellulitis  Assessment and Plan of Treatment: You were admitted to the hospital with complaint of left lower extremity pain and redness at the injection site of a recent procedure you had as outpatient (Varithena procedure). You were seen by the infectious diseae doctors which recommended you start on intravenous antibioitcs while admitted, Ancef 1 gram two times a day and continue as outpaient for a total of 6 weeks; UNTIL 10/16/23.   Take all of your antibiotics as ordered.  Call your Health Care Provider within two days of arriving home to make a follow up appointment within one week.  If the affected cellulitic area increases in redness, warmth, pain or swelling call your Health Care Provider.  If you develop fever, chills, and/or malaise, call your Health Care Provider.        SECONDARY DISCHARGE DIAGNOSES  Diagnosis: DVT, lower extremity  Assessment and Plan of Treatment: Ultrasound of your left lower extremity showed you have a left posterior tibial vein clot. You were started on intravenous blood thinning medication and now transitioned to     PRINCIPAL DISCHARGE DIAGNOSIS  Diagnosis: Cellulitis  Assessment and Plan of Treatment: You were admitted to the hospital with complaint of left lower extremity pain and redness at the injection site of a recent procedure you had as outpatient (Varithena procedure). You were seen by the infectious diseae doctors which recommended you start on intravenous antibioitcs while admitted, Ancef 1 gram two times a day and continue as outpaient for a total of 6 weeks; UNTIL 10/16/23.   Take all of your antibiotics as ordered.  Call your Health Care Provider within two days of arriving home to make a follow up appointment within one week.  If the affected cellulitic area increases in redness, warmth, pain or swelling call your Health Care Provider.  If you develop fever, chills, and/or malaise, call your Health Care Provider.        SECONDARY DISCHARGE DIAGNOSES  Diagnosis: DVT, lower extremity  Assessment and Plan of Treatment: Ultrasound of your left lower extremity showed you have a left posterior tibial vein clot. You were started on intravenous blood thinning medication and now transitioned to Eliquis. Take 2 tables two times a day until 9/16/23; then continue with Eliquis 5 mg two times a day STARTING 9/17/23, Follow with your PCP for dvt management.  Take your "blood thinners" as prescribed.  Walking is encouraged, increase activity as tolerated.  If you develop new leg pain, swelling, and/or redness contact your healthcare provider.  If you develop new chest pain with difficulty breathing, a rapid heart rate and/or a feeling of passing out call emergency medical services 911.      Diagnosis: HTN (hypertension)  Assessment and Plan of Treatment: Continue current medical management.     PRINCIPAL DISCHARGE DIAGNOSIS  Diagnosis: Cellulitis  Assessment and Plan of Treatment: You were admitted to the hospital with complaint of left lower extremity pain and redness at the injection site of a recent procedure you had as outpatient (Varithena procedure). You were seen by the infectious diseae doctors which recommended you start on intravenous antibioitcs while admitted, Ancef 1 gram two times a day and continue as outpaient for a total of 6 weeks; UNTIL 10/16/23.   Take all of your antibiotics as ordered.  Call your Health Care Provider within two days of arriving home to make a follow up appointment within one week.  If the affected cellulitic area increases in redness, warmth, pain or swelling call your Health Care Provider.  If you develop fever, chills, and/or malaise, call your Health Care Provider.        SECONDARY DISCHARGE DIAGNOSES  Diagnosis: DVT, lower extremity  Assessment and Plan of Treatment: Ultrasound of your left lower extremity showed you have a left posterior tibial vein clot. You were started on intravenous blood thinning medication and now transitioned to Eliquis. Take 2 tables two times a day until 9/16/23; then continue with Eliquis 5 mg two times a day STARTING 9/17/23, Follow with your PCP for dvt management.  Take your "blood thinners" as prescribed.  Walking is encouraged, increase activity as tolerated.  If you develop new leg pain, swelling, and/or redness contact your healthcare provider.  .      Diagnosis: HTN (hypertension)  Assessment and Plan of Treatment: Continue current medical management.

## 2023-09-09 NOTE — DISCHARGE NOTE PROVIDER - HOSPITAL COURSE
HPI:  89 y/o f pmhx varicose veins, HTN, hypothyroid, colon cancer s/p resection 2002, osteoporosis, s/p tavr, presents w/ pain, erythema LLE. On Monday, patient had Varithena procedure for varicose veins. On friday, patient followed up with wound care who did not like the look of the surrounding area, sent back to vascular. Vascular performed a lle dvt study which didn't demonstrate any clots. Vascular believed this may be a normal inflammatory reaction vs allergic rxn (?) to varithena injectable, sent patient home on pepcid, prednisone and benadryl to be taken at night. She then woke up on saturday with fever, chills. Complaining of nausea, headache, and pain of the LLE. She has also had decreased appetite. Patient states she feels "lousy" and has no other specific complaints. Denies chest pain, shortness of breath, abd pain, vomiting, diarrhea.    ED course:  vitals - bp 94/52, rr 17, hr 70, t 97.7, spo2 99  labs - wbc 11.67, hgb 8.1, na 127, bun/cr 46/1.6, gluc 187, ca 7.2, alb 2.3, alk phos 275, ast 90, alt 147  imaging - US duplex venous lower ext ltd - Acute deep venous thrombosis: below the knee.  EKG - pending  meds - tylenol x1, ns bolus, vanc x1, zosyn x1, started on heparin drip (03 Sep 2023 19:25)      ---  HOSPITAL COURSE: Patient admitted to medicine floor for management of     Pt seen and examined on day of discharge. Patient is medically optimized for discharge to home with close outpatient followup.    PHYSICAL EXAM ON DAY OF DISCHARGE:        ---  CONSULTANTS:     ---  TIME SPENT:  I, the attending physician, was physically present for the key portions of the evaluation and management (E/M) service provided. The total amount of time spent reviewing the hospital notes, laboratory values, imaging findings, assessing/counseling the patient, discussing with consultant physicians, social work, nursing staff was -- minutes    ---  Primary care provider was made aware of plan for discharge:      [  ] NO     [  ] YES   HPI:  89 y/o f pmhx varicose veins, HTN, hypothyroid, colon cancer s/p resection 2002, osteoporosis, s/p tavr, presents w/ pain, erythema LLE. On Monday, patient had Varithena procedure for varicose veins. On friday, patient followed up with wound care who did not like the look of the surrounding area, sent back to vascular. Vascular performed a lle dvt study which didn't demonstrate any clots. Vascular believed this may be a normal inflammatory reaction vs allergic rxn (?) to varithena injectable, sent patient home on pepcid, prednisone and benadryl to be taken at night. She then woke up on saturday with fever, chills. Complaining of nausea, headache, and pain of the LLE. She has also had decreased appetite. Patient states she feels "lousy" and has no other specific complaints. Denies chest pain, shortness of breath, abd pain, vomiting, diarrhea.    ED course:  vitals - bp 94/52, rr 17, hr 70, t 97.7, spo2 99  labs - wbc 11.67, hgb 8.1, na 127, bun/cr 46/1.6, gluc 187, ca 7.2, alb 2.3, alk phos 275, ast 90, alt 147  imaging - US duplex venous lower ext ltd - Acute deep venous thrombosis: below the knee.  EKG - pending  meds - tylenol x1, ns bolus, vanc x1, zosyn x1, started on heparin drip (03 Sep 2023 19:25)      ---  HOSPITAL COURSE: Patient admitted to medicine floor for management of 89 y/o f pmhx varicose veins, HTN, hypothyroid, colon cancer s/p resection 2002, osteoporosis, s/p tavr, presents w/ pain, erythema LLE, admitted with cellulitis, confirmed DVT,- erythema, pain, swelling LLE surrounding area of injection site. Patient had Varithena procedure on Monday    Sepsis poa  secondary to LLE Cellulitis /eczema -  likely related recent Varithena procedure  , found to have   (foam injectable for varicose veins) MSSA  Bacteremic +Blood cultures   -Continue Ancef 1gm q12 hr ,  will need total 6 weeks,- ECHO no vegetation    Repeat blood cultures remain  neg x 48 hours - s/p  PICC line   home care iv abx arranged.    LLE DVT  US LLE: thrombosis is seen of the left posterior tibial vein in the mid calf where there is a p  erforating vein connected to the greater saphenous vein and extends down into the posterior tibial vein.  - This appears to be a provoked DVT in the setting of Varithena  repeat  ,  Below knee DVT- repeat doppler There is persistent DVT affecting a left posterior tibial vein without proximal propagation.  -Stop heparin gtt due to  anemia  but later  H/H remain  stable so resumed Lovenox full ac  will switch to Eliquis at dc  pt agree with plan  .    iron deficiency anemia - FOBT negative   and s/p 1 PRBC - GI Consulted  dr sheikh diaz colon ca first    monitor closely for GIB but no gi bleed  tolerated blood thinner well. plan dc on po Eliquis.       Patient is medically optimized for discharge to home with close outpatient followup   clear by id dr bang .    PHYSICAL EXAM ON DAY OF DISCHARGE: 9/9/23    Vital Signs Last 24 Hrs  T(C): 36.4 (09 Sep 2023 04:54), Max: 36.8 (08 Sep 2023 13:19)  T(F): 97.6 (09 Sep 2023 04:54), Max: 98.2 (08 Sep 2023 13:19)  HR: 67 (09 Sep 2023 04:54) (65 - 68)  BP: 146/65 (09 Sep 2023 04:54) (145/51 - 148/78)  BP(mean): --  RR: 17 (09 Sep 2023 04:54) (17 - 18)  SpO2: 93% (09 Sep 2023 04:54) (92% - 96%)    Parameters below as of 09 Sep 2023 04:54  Patient On (Oxygen Delivery Method): room air    PHYSICAL EXAM: day of dc 9/9/23  GENERAL: NAD,  HEAD:  Atraumatic, Normocephalic  EYES: EOMI, PERRLA, conjunctiva and sclera clear  ENMT: t; Moist mucous membranes  NECK: Supple, No JVD  NERVOUS SYSTEM:  Alert & Oriented X3; Motor Strength 5/5 B/L upper and lower extremities; DTRs 2+ intact and symmetric  CHEST/LUNG:  percussion bilaterally; No rales, rhonchi, wheezing,   HEART: Regular rate and rhythm; No murmurs,  ABDOMEN: Soft, Nontender, Nondistended; Bowel sounds present  EXTREMITIES:  2+ Peripheral Pulses, No clubbing, cyanosis, or edema  SKIN: No rashes or lesions + lt leg mild redness  dryness eczema / dressing         ---  CONSULTANTS:     ---  TIME SPENT:  I, the attending physician, was physically present for the key portions of the evaluation and management (E/M) service provided. The total amount of time spent reviewing the hospital notes, laboratory values, imaging findings, assessing/counseling the patient, discussing with consultant physicians, social work, nursing staff was -40- minutes

## 2023-09-09 NOTE — DISCHARGE NOTE PROVIDER - NSDCFUADDINST_GEN_ALL_CORE_FT
Take your "blood thinners" as prescribed.  Walking is encouraged, increase activity as tolerated.  If you develop new leg pain, swelling, and/or redness contact your healthcare provider.  If you develop new chest pain with difficulty breathing, a rapid heart rate and/or a feeling of passing out call emergency medical services 911.

## 2023-09-09 NOTE — DISCHARGE NOTE PROVIDER - NSDCMRMEDTOKEN_GEN_ALL_CORE_FT
areds 20mg: take 1 tablet orally once a day  aspirin 81 mg oral capsule: 1 cap(s) orally once a day  ceFAZolin: 1 gram(s) intravenous 2 times a day Total 6 weeks until 10/16/23  cyanocobalamin 500 mcg oral tablet: 1 tab(s) orally 2 times a day  irbesartan 300 mg oral tablet: 1 tab(s) orally once a day  levothyroxine 100 mcg (0.1 mg) oral tablet: 1 tab(s) orally once a day   areds 20mg: take 1 tablet orally once a day  aspirin 81 mg oral capsule: 1 cap(s) orally once a day  ceFAZolin: 1 gram(s) intravenous 2 times a day Total 6 weeks until 10/16/23  cyanocobalamin 500 mcg oral tablet: 1 tab(s) orally 2 times a day  Eliquis 5 mg oral tablet: 2 tab(s) orally 2 times a day Take 2 tables two times a day until 9/16/23; then continue with Eliquis 5 mg two times a day follow with your PCP for dvt management  irbesartan 300 mg oral tablet: 1 tab(s) orally once a day  levothyroxine 100 mcg (0.1 mg) oral tablet: 1 tab(s) orally once a day

## 2023-09-10 LAB
CULTURE RESULTS: SIGNIFICANT CHANGE UP
CULTURE RESULTS: SIGNIFICANT CHANGE UP
SPECIMEN SOURCE: SIGNIFICANT CHANGE UP
SPECIMEN SOURCE: SIGNIFICANT CHANGE UP

## 2023-09-13 ENCOUNTER — NON-APPOINTMENT (OUTPATIENT)
Age: 88
End: 2023-09-13

## 2023-09-15 RX ORDER — PREGABALIN 225 MG/1
1 CAPSULE ORAL
Refills: 0 | DISCHARGE

## 2023-09-15 RX ORDER — IRBESARTAN 75 MG/1
1 TABLET ORAL
Refills: 0 | DISCHARGE

## 2023-09-15 RX ORDER — ASPIRIN/CALCIUM CARB/MAGNESIUM 324 MG
1 TABLET ORAL
Refills: 0 | DISCHARGE

## 2023-09-15 RX ORDER — LEVOTHYROXINE SODIUM 125 MCG
1 TABLET ORAL
Refills: 0 | DISCHARGE

## 2023-09-17 RX ORDER — APIXABAN 2.5 MG/1
1 TABLET, FILM COATED ORAL
Qty: 60 | Refills: 0
Start: 2023-09-17 | End: 2023-10-16

## 2023-09-18 DIAGNOSIS — S81.802D UNSPECIFIED OPEN WOUND, LEFT LOWER LEG, SUBSEQUENT ENCOUNTER: ICD-10-CM

## 2023-09-18 DIAGNOSIS — L97.929 NON-PRESSURE CHRONIC ULCER OF UNSPECIFIED PART OF LEFT LOWER LEG WITH UNSPECIFIED SEVERITY: ICD-10-CM

## 2023-09-18 DIAGNOSIS — I87.312 CHRONIC VENOUS HYPERTENSION (IDIOPATHIC) WITH ULCER OF LEFT LOWER EXTREMITY: ICD-10-CM

## 2023-09-20 ENCOUNTER — OUTPATIENT (OUTPATIENT)
Dept: OUTPATIENT SERVICES | Facility: HOSPITAL | Age: 88
LOS: 1 days | End: 2023-09-20
Payer: MEDICARE

## 2023-09-20 ENCOUNTER — APPOINTMENT (OUTPATIENT)
Dept: WOUND CARE | Facility: HOSPITAL | Age: 88
End: 2023-09-20
Payer: COMMERCIAL

## 2023-09-20 ENCOUNTER — NON-APPOINTMENT (OUTPATIENT)
Age: 88
End: 2023-09-20

## 2023-09-20 ENCOUNTER — APPOINTMENT (OUTPATIENT)
Dept: VASCULAR SURGERY | Facility: CLINIC | Age: 88
End: 2023-09-20

## 2023-09-20 VITALS
OXYGEN SATURATION: 98 % | SYSTOLIC BLOOD PRESSURE: 177 MMHG | HEART RATE: 60 BPM | RESPIRATION RATE: 16 BRPM | TEMPERATURE: 97.2 F | DIASTOLIC BLOOD PRESSURE: 61 MMHG

## 2023-09-20 DIAGNOSIS — T14.90XA INJURY, UNSPECIFIED, INITIAL ENCOUNTER: ICD-10-CM

## 2023-09-20 DIAGNOSIS — Z95.2 PRESENCE OF PROSTHETIC HEART VALVE: Chronic | ICD-10-CM
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
## 2023-09-20 DIAGNOSIS — Z90.49 ACQUIRED ABSENCE OF OTHER SPECIFIED PARTS OF DIGESTIVE TRACT: Chronic | ICD-10-CM

## 2023-09-20 DIAGNOSIS — Z98.49 CATARACT EXTRACTION STATUS, UNSPECIFIED EYE: Chronic | ICD-10-CM

## 2023-09-20 DIAGNOSIS — Z96.659 PRESENCE OF UNSPECIFIED ARTIFICIAL KNEE JOINT: Chronic | ICD-10-CM
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
## 2023-09-20 PROCEDURE — 99213 OFFICE O/P EST LOW 20 MIN: CPT

## 2023-09-20 PROCEDURE — G0463: CPT

## 2023-09-22 PROBLEM — Z87.39 PERSONAL HISTORY OF OTHER DISEASES OF THE MUSCULOSKELETAL SYSTEM AND CONNECTIVE TISSUE: Chronic | Status: ACTIVE | Noted: 2023-09-03

## 2023-09-22 PROBLEM — E03.9 HYPOTHYROIDISM, UNSPECIFIED: Chronic | Status: ACTIVE | Noted: 2023-09-03

## 2023-09-22 PROBLEM — I10 ESSENTIAL (PRIMARY) HYPERTENSION: Chronic | Status: ACTIVE | Noted: 2023-09-03

## 2023-09-22 PROBLEM — Z85.038 PERSONAL HISTORY OF OTHER MALIGNANT NEOPLASM OF LARGE INTESTINE: Chronic | Status: ACTIVE | Noted: 2023-09-03

## 2023-09-25 ENCOUNTER — APPOINTMENT (OUTPATIENT)
Dept: ULTRASOUND IMAGING | Facility: CLINIC | Age: 88
End: 2023-09-25
Payer: MEDICARE

## 2023-09-25 PROCEDURE — 93971 EXTREMITY STUDY: CPT | Mod: LT

## 2023-10-31 DIAGNOSIS — T14.90XA INJURY, UNSPECIFIED, INITIAL ENCOUNTER: ICD-10-CM

## 2023-11-28 NOTE — DISCHARGE NOTE PROVIDER - NSDCHHENCOUNTER_GEN_ALL_CORE
09-Sep-2023

## 2024-01-04 ENCOUNTER — APPOINTMENT (OUTPATIENT)
Dept: ENDOCRINOLOGY | Facility: CLINIC | Age: 89
End: 2024-01-04
Payer: MEDICARE

## 2024-01-04 VITALS
OXYGEN SATURATION: 99 % | HEART RATE: 59 BPM | HEIGHT: 61 IN | DIASTOLIC BLOOD PRESSURE: 60 MMHG | WEIGHT: 122 LBS | BODY MASS INDEX: 23.03 KG/M2 | SYSTOLIC BLOOD PRESSURE: 140 MMHG

## 2024-01-04 DIAGNOSIS — M40.209 UNSPECIFIED KYPHOSIS, SITE UNSPECIFIED: ICD-10-CM

## 2024-01-04 DIAGNOSIS — E03.9 HYPOTHYROIDISM, UNSPECIFIED: ICD-10-CM

## 2024-01-04 DIAGNOSIS — M81.0 AGE-RELATED OSTEOPOROSIS W/OUT CURRENT PATHOLOGICAL FRACTURE: ICD-10-CM

## 2024-01-04 PROCEDURE — 96401 CHEMO ANTI-NEOPL SQ/IM: CPT

## 2024-01-04 PROCEDURE — 99214 OFFICE O/P EST MOD 30 MIN: CPT | Mod: 25

## 2024-01-04 RX ORDER — LOSARTAN POTASSIUM 100 MG/1
TABLET, FILM COATED ORAL
Refills: 0 | Status: DISCONTINUED | COMMUNITY
End: 2024-01-04

## 2024-01-04 RX ORDER — IRBESARTAN 300 MG/1
TABLET ORAL
Refills: 0 | Status: ACTIVE | COMMUNITY

## 2024-01-04 RX ORDER — FAMOTIDINE 20 MG/1
20 TABLET, FILM COATED ORAL DAILY
Qty: 5 | Refills: 0 | Status: DISCONTINUED | COMMUNITY
Start: 2023-09-01 | End: 2024-01-04

## 2024-01-04 RX ORDER — PREDNISONE 5 MG/1
5 TABLET ORAL
Qty: 5 | Refills: 0 | Status: DISCONTINUED | COMMUNITY
Start: 2023-09-01 | End: 2024-01-04

## 2024-01-04 RX ORDER — DENOSUMAB 60 MG/ML
60 INJECTION SUBCUTANEOUS
Refills: 0 | Status: ACTIVE | COMMUNITY

## 2024-01-04 RX ORDER — MULTIVIT-MIN/FOLIC/VIT K/LYCOP 400-300MCG
25 MCG TABLET ORAL
Refills: 0 | Status: DISCONTINUED | COMMUNITY
End: 2024-01-04

## 2024-01-05 RX ORDER — DENOSUMAB 60 MG/ML
60 INJECTION SUBCUTANEOUS
Qty: 1 | Refills: 0 | Status: COMPLETED | OUTPATIENT
Start: 2024-01-05

## 2024-01-05 RX ADMIN — DENOSUMAB 60 MG/ML: 60 INJECTION SUBCUTANEOUS at 00:00

## 2024-01-05 NOTE — ASSESSMENT
[Denosumab Therapy] : Risks  and benefits of denosumab therapy were discussed with the patient including eczema, cellulitis, osteonecrosis of the jaw and atypical femur fractures [FreeTextEntry1] : 89 y/o female with  1. Osteoporosis: Had one dose of Reclast 12/2016, tolerated well. No APR. Still has active reflux disease. No interval fx. Bone mineral density 2017 appeared improved in spine and stable in the hip. Pt elected to take a second dose of IV Reclast in Jan 2018. Repeat BMD 12/2018 indicates stability in all sites. BMD 12/2019 indicated low but stable osteoporosis in femoral neck and proximal radius and osteopenia in spine and total hip. Pt had third dose of intravenous Reclast February 2020. Tolerated well. No thigh pain, no interval fx, no APR. No ONJ. BMD 6/2021 indicates worsened low osteoporosis in hip and stable osteoporosis in proximal radius. BMD results reviewed w/ pt.  The patient had 1 dose of Prolia 2021 but did not return for follow-up. It is unclear if this was due to concerns about COVID or lack of understanding of need for return. Current bone density 2023 significantly reduced versus prior studies. Patient strongly advised of the need for treatment and for routine to 6-month follow-up of Prolia. The risk of rapid bone loss if patient misses dose emphasized. Prolia buy and bill.   Pt has not been to the dentist in the last year. Advised pt to see DDS q6 months especially while on Prolia and inform the office if she is planning major dental work. Pt agrees.   2. Physical exam notable for severe kyphosis ribs inside pelvis, stable  3. Hypothyroidism: clinically and chemically euthyroid on LT4 125 mcg daily. No local neck pain. No dysphagia or dysphonia. No raciness, shakiness, heat/cold intolerance, tiredness, or fatigue. No palpitations, tremors, or sudden weight gain/loss.  Call Dr. Moe for labs ([phone])  Follow-up 6 months for BMD & Prolia.

## 2024-01-05 NOTE — PROCEDURE
[FreeTextEntry1] : Bone mineral density: 06/12/2023 indication: Comparison to 2021 spine not performed total hip -3.5 osteoporosis -9.9% femoral neck -3.2 osteoporosis no significant change versus 2021 but significantly decreased versus 2019 proximal radius -3.1 osteoporosis -5.5%  Bone mineral density: 06/08/2021  Indication: vs. 2019 assess response to medication Spine: not performed Total hip: -3.0 osteoporosis, -5.8% Femoral neck: -3.0 osteoporosis, -8.5% Proximal radius: -2.6 osteoporosis, no significant change  Bone mineral density: 12/04/2019  Indication: vs. 2018 assess response to medication Spine: (L1-L3) -2.2 osteopenia +7.3% suspect arthritis Total hip: -2.7 osteopenia, no significant change Femoral neck: -2.6 osteoporosis, no significant change Proximal radius: -2.8 osteoporosis, no significant change  Bone mineral density: 12/11/2018  Indication: vs. 2017 assess response to medication  Spine: (L1-L3) -2.7 osteoporosis, no significant change  Total hip: -2.7 osteoporosis, no significant change  Femoral neck: -2.8 osteoporosis, no significant change  Proximal radius: -2.6 osteoporosis, no significant change   Bone mineral density 12/7/17 indication: assess response to medication  spine -2.6,osteoporosis, appears improved compared to prior outside study total hip  -2.7, osteoporosis, no significant change  femoral neck -2.7 osteoporosis, no significant change  proximal radius -2.9 osteoporosis No prior

## 2024-01-05 NOTE — PHYSICAL EXAM
[Alert] : alert [Well Nourished] : well nourished [No Acute Distress] : no acute distress [Well Developed] : well developed [Normal Sclera/Conjunctiva] : normal sclera/conjunctiva [EOMI] : extra ocular movement intact [No Proptosis] : no proptosis [Normal Lips/Gums] : the lips and gums were normal [Normal Oropharynx] : the oropharynx was normal [Thyroid Not Enlarged] : the thyroid was not enlarged [No Thyroid Nodules] : no palpable thyroid nodules [Clear to Auscultation] : lungs were clear to auscultation bilaterally [Normal S1, S2] : normal S1 and S2 [No Murmurs] : no murmurs [Normal Rate] : heart rate was normal [Regular Rhythm] : with a regular rhythm [No Edema] : no peripheral edema [Not Tender] : non-tender [Not Distended] : not distended [Soft] : abdomen soft [Normal Anterior Cervical Nodes] : no anterior cervical lymphadenopathy [No Spinal Tenderness] : no spinal tenderness [Kyphosis] : kyphosis present [No Stigmata of Cushings Syndrome] : no stigmata of Cushings Syndrome [Normal Gait] : normal gait [Normal Reflexes] : deep tendon reflexes were 2+ and symmetric [No Tremors] : no tremors [Oriented x3] : oriented to person, place, and time [de-identified] : Elderly female [de-identified] : Severe kyphosis, ribs inside pelvis

## 2024-01-05 NOTE — HISTORY OF PRESENT ILLNESS
[Zoledronic Acid (Zometa)] : Zoledronic Acid [Calcium (supplements)] : calcium from a dietary supplement [Vitamin D (supplements)] : Vitamin D as a dietary supplement [Prolia (Denosumab)] : Prolia (Denosumab) [Kidney Stones] : a history of kidney stones [Regular Dental Follow-Up] : regular dental follow-up [FreeTextEntry1] : S/p Left GSV Venaseal on 9/20/23 c/b 2 DVTs in LLE and staph infection. Pt was admitted for 1 week at [facility], now on ASA 81 mg. Patient denies any interval fractures.  History: Pt appears to have had low bone density for several years, at least since 2008, though she was not treated at that time. No h/o fx. Repeat bone density 2016 was not read correctly as they included lumbar spine 4 which is clearly falsely positive on my review. . L1-L3 are consistent with osteoporosis. L fem neck: -2.2 R fem neck: -2.5 L tot hip: -2.5 R tot hip:-2.7. Pt had a single episode of kidney stone 1986 with no recurrence. Pt currently has active GERD. Had first dose of Reclast 12/2016, 2nd dose of IV Reclast in Jan 2018. Pt had third dose of intravenous Reclast February 2020. Tolerated well. No thigh pain, no interval fx, no APR. No ONJ.   The patient had 1 dose of Prolia 2021 in the [place] office but did not return for follow-up. It is unclear if this was due to concerns about COVID or lack of understanding of need for return. BMD 2023 significantly reduced versus prior studies. Prolia restarted June 2023. Tolerating well with no injection site irritation. No interval fx including AFF. Pt has not been to the dentist in the last year. No ONJ.  MRI 8/2020 indicates L2 compression fx. Pt still c/o back pain.  Endocrine hx is notable for hypothyroidism for many years currently on LT4 125 mcg daily. No sx of hypothyroidism or hyperthyroidism on this No local neck pain. No dysphagia or dysphonia. No raciness, shakiness, heat/cold intolerance, tiredness, or fatigue. No palpitations, tremors, or sudden weight gain/loss.  The patient had a TAVR at [facility] which apparently went well.  [Disordered Eating] : no past or present history of disordered eating [Taking Steroids] : no past or present history of taking steroids [Family History of Osteoporosis] : no family history of osteoporosis [Family History of Breast Cancer] : no family history of breast cancer [Family History of Hip Fracture] : no family history of hip fracture [Hyperparathyroidism] : no hyperparathyroidism [History of Radiation Therapy] : no history of radiation therapy [Previous Fragility Fracture] : no previous fragility fracture

## 2024-07-09 ENCOUNTER — APPOINTMENT (OUTPATIENT)
Dept: ENDOCRINOLOGY | Facility: CLINIC | Age: 89
End: 2024-07-09
Payer: MEDICARE

## 2024-07-09 VITALS
HEIGHT: 61.25 IN | BODY MASS INDEX: 22.18 KG/M2 | OXYGEN SATURATION: 99 % | WEIGHT: 119 LBS | DIASTOLIC BLOOD PRESSURE: 60 MMHG | HEART RATE: 80 BPM | SYSTOLIC BLOOD PRESSURE: 126 MMHG

## 2024-07-09 DIAGNOSIS — M40.209 UNSPECIFIED KYPHOSIS, SITE UNSPECIFIED: ICD-10-CM

## 2024-07-09 DIAGNOSIS — E03.9 HYPOTHYROIDISM, UNSPECIFIED: ICD-10-CM

## 2024-07-09 DIAGNOSIS — M81.0 AGE-RELATED OSTEOPOROSIS W/OUT CURRENT PATHOLOGICAL FRACTURE: ICD-10-CM

## 2024-07-09 PROCEDURE — 77080 DXA BONE DENSITY AXIAL: CPT | Mod: GA

## 2024-07-09 PROCEDURE — 96401 CHEMO ANTI-NEOPL SQ/IM: CPT

## 2024-07-09 PROCEDURE — ZZZZZ: CPT

## 2024-07-09 PROCEDURE — 99214 OFFICE O/P EST MOD 30 MIN: CPT | Mod: 25

## 2024-07-09 RX ORDER — DENOSUMAB 60 MG/ML
60 INJECTION SUBCUTANEOUS
Qty: 1 | Refills: 0 | Status: COMPLETED | OUTPATIENT
Start: 2024-07-09

## 2024-07-09 RX ADMIN — DENOSUMAB 60 MG/ML: 60 INJECTION SUBCUTANEOUS at 00:00

## 2025-01-14 ENCOUNTER — APPOINTMENT (OUTPATIENT)
Dept: ENDOCRINOLOGY | Facility: CLINIC | Age: 89
End: 2025-01-14
Payer: MEDICARE

## 2025-01-14 DIAGNOSIS — M81.0 AGE-RELATED OSTEOPOROSIS W/OUT CURRENT PATHOLOGICAL FRACTURE: ICD-10-CM

## 2025-01-14 PROCEDURE — 96401 CHEMO ANTI-NEOPL SQ/IM: CPT

## 2025-01-14 RX ORDER — DENOSUMAB 60 MG/ML
60 INJECTION SUBCUTANEOUS
Qty: 1 | Refills: 0 | Status: COMPLETED | OUTPATIENT
Start: 2025-01-08

## 2025-07-22 ENCOUNTER — APPOINTMENT (OUTPATIENT)
Dept: ENDOCRINOLOGY | Facility: CLINIC | Age: 89
End: 2025-07-22

## 2025-07-23 ENCOUNTER — APPOINTMENT (OUTPATIENT)
Dept: ENDOCRINOLOGY | Facility: CLINIC | Age: 89
End: 2025-07-23
Payer: MEDICARE

## 2025-07-23 VITALS
SYSTOLIC BLOOD PRESSURE: 138 MMHG | OXYGEN SATURATION: 98 % | HEART RATE: 75 BPM | DIASTOLIC BLOOD PRESSURE: 60 MMHG | WEIGHT: 114 LBS | BODY MASS INDEX: 21.37 KG/M2

## 2025-07-23 DIAGNOSIS — M81.0 AGE-RELATED OSTEOPOROSIS W/OUT CURRENT PATHOLOGICAL FRACTURE: ICD-10-CM

## 2025-07-23 DIAGNOSIS — M40.209 UNSPECIFIED KYPHOSIS, SITE UNSPECIFIED: ICD-10-CM

## 2025-07-23 DIAGNOSIS — E03.9 HYPOTHYROIDISM, UNSPECIFIED: ICD-10-CM

## 2025-07-23 PROCEDURE — G2211 COMPLEX E/M VISIT ADD ON: CPT

## 2025-07-23 PROCEDURE — 99214 OFFICE O/P EST MOD 30 MIN: CPT

## 2025-07-25 RX ADMIN — ZOLEDRONIC ACID 5 MG/100ML: 5 INJECTION INTRAVENOUS at 00:00

## 2025-07-26 LAB
25(OH)D3 SERPL-MCNC: 57.6 NG/ML
ALBUMIN SERPL ELPH-MCNC: 4.5 G/DL
ALP BLD-CCNC: 55 U/L
ALT SERPL-CCNC: 20 U/L
ANION GAP SERPL CALC-SCNC: 13 MMOL/L
AST SERPL-CCNC: 23 U/L
BILIRUB SERPL-MCNC: 0.3 MG/DL
BUN SERPL-MCNC: 37 MG/DL
CALCIUM SERPL-MCNC: 9.3 MG/DL
CALCIUM SERPL-MCNC: 9.3 MG/DL
CHLORIDE SERPL-SCNC: 108 MMOL/L
CO2 SERPL-SCNC: 21 MMOL/L
CREAT SERPL-MCNC: 1.35 MG/DL
EGFRCR SERPLBLD CKD-EPI 2021: 36 ML/MIN/1.73M2
GLUCOSE SERPL-MCNC: 96 MG/DL
PARATHYROID HORMONE INTACT: 100 PG/ML
PHOSPHATE SERPL-MCNC: 3.8 MG/DL
POTASSIUM SERPL-SCNC: 5.2 MMOL/L
PROT SERPL-MCNC: 6.5 G/DL
SODIUM SERPL-SCNC: 142 MMOL/L
T4 FREE SERPL-MCNC: 1.3 NG/DL
TSH SERPL-ACNC: 2.27 UIU/ML

## 2025-08-02 ENCOUNTER — INPATIENT (INPATIENT)
Facility: HOSPITAL | Age: 89
LOS: 6 days | Discharge: HOME | DRG: 375 | End: 2025-08-09
Attending: STUDENT IN AN ORGANIZED HEALTH CARE EDUCATION/TRAINING PROGRAM | Admitting: STUDENT IN AN ORGANIZED HEALTH CARE EDUCATION/TRAINING PROGRAM
Payer: MEDICARE

## 2025-08-02 VITALS
RESPIRATION RATE: 15 BRPM | TEMPERATURE: 98 F | OXYGEN SATURATION: 100 % | HEART RATE: 60 BPM | SYSTOLIC BLOOD PRESSURE: 190 MMHG | DIASTOLIC BLOOD PRESSURE: 61 MMHG | WEIGHT: 123.9 LBS

## 2025-08-02 DIAGNOSIS — Z96.659 PRESENCE OF UNSPECIFIED ARTIFICIAL KNEE JOINT: Chronic | ICD-10-CM

## 2025-08-02 DIAGNOSIS — Z95.2 PRESENCE OF PROSTHETIC HEART VALVE: Chronic | ICD-10-CM

## 2025-08-02 DIAGNOSIS — K56.609 UNSPECIFIED INTESTINAL OBSTRUCTION, UNSPECIFIED AS TO PARTIAL VERSUS COMPLETE OBSTRUCTION: ICD-10-CM

## 2025-08-02 DIAGNOSIS — Z98.49 CATARACT EXTRACTION STATUS, UNSPECIFIED EYE: Chronic | ICD-10-CM

## 2025-08-02 DIAGNOSIS — Z90.49 ACQUIRED ABSENCE OF OTHER SPECIFIED PARTS OF DIGESTIVE TRACT: Chronic | ICD-10-CM

## 2025-08-02 LAB
ACANTHOCYTES BLD QL SMEAR: SLIGHT — SIGNIFICANT CHANGE UP
ALBUMIN SERPL ELPH-MCNC: 4.1 G/DL — SIGNIFICANT CHANGE UP (ref 3.3–5)
ALP SERPL-CCNC: 59 U/L — SIGNIFICANT CHANGE UP (ref 40–120)
ALT FLD-CCNC: 14 U/L — SIGNIFICANT CHANGE UP (ref 10–45)
ANION GAP SERPL CALC-SCNC: 12 MMOL/L — SIGNIFICANT CHANGE UP (ref 5–17)
ANISOCYTOSIS BLD QL: SLIGHT — SIGNIFICANT CHANGE UP
APPEARANCE UR: CLEAR — SIGNIFICANT CHANGE UP
AST SERPL-CCNC: 19 U/L — SIGNIFICANT CHANGE UP (ref 10–40)
BASOPHILS # BLD AUTO: 0.03 K/UL — SIGNIFICANT CHANGE UP (ref 0–0.2)
BASOPHILS # BLD MANUAL: 0 K/UL — SIGNIFICANT CHANGE UP (ref 0–0.2)
BASOPHILS NFR BLD AUTO: 0.2 % — SIGNIFICANT CHANGE UP (ref 0–2)
BASOPHILS NFR BLD MANUAL: 0 % — SIGNIFICANT CHANGE UP (ref 0–2)
BILIRUB SERPL-MCNC: 0.4 MG/DL — SIGNIFICANT CHANGE UP (ref 0.2–1.2)
BILIRUB UR-MCNC: NEGATIVE — SIGNIFICANT CHANGE UP
BUN SERPL-MCNC: 41 MG/DL — HIGH (ref 7–23)
CALCIUM SERPL-MCNC: 9.2 MG/DL — SIGNIFICANT CHANGE UP (ref 8.4–10.5)
CHLORIDE SERPL-SCNC: 104 MMOL/L — SIGNIFICANT CHANGE UP (ref 96–108)
CO2 SERPL-SCNC: 20 MMOL/L — LOW (ref 22–31)
COLOR SPEC: YELLOW — SIGNIFICANT CHANGE UP
CREAT SERPL-MCNC: 1.33 MG/DL — HIGH (ref 0.5–1.3)
DACRYOCYTES BLD QL SMEAR: SLIGHT — SIGNIFICANT CHANGE UP
DIFF PNL FLD: NEGATIVE — SIGNIFICANT CHANGE UP
EGFR: 37 ML/MIN/1.73M2 — LOW
EGFR: 37 ML/MIN/1.73M2 — LOW
EOSINOPHIL # BLD AUTO: 0.09 K/UL — SIGNIFICANT CHANGE UP (ref 0–0.5)
EOSINOPHIL # BLD MANUAL: 0 K/UL — SIGNIFICANT CHANGE UP (ref 0–0.5)
EOSINOPHIL NFR BLD AUTO: 0.7 % — SIGNIFICANT CHANGE UP (ref 0–6)
EOSINOPHIL NFR BLD MANUAL: 0 % — SIGNIFICANT CHANGE UP (ref 0–6)
GLUCOSE SERPL-MCNC: 103 MG/DL — HIGH (ref 70–99)
GLUCOSE UR QL: NEGATIVE MG/DL — SIGNIFICANT CHANGE UP
HCT VFR BLD CALC: 32.6 % — LOW (ref 34.5–45)
HGB BLD-MCNC: 10.9 G/DL — LOW (ref 11.5–15.5)
IMM GRANULOCYTES # BLD AUTO: 0.04 K/UL — SIGNIFICANT CHANGE UP (ref 0–0.07)
IMM GRANULOCYTES NFR BLD AUTO: 0.3 % — SIGNIFICANT CHANGE UP (ref 0–0.9)
KETONES UR QL: NEGATIVE MG/DL — SIGNIFICANT CHANGE UP
LACTATE BLDV-MCNC: 1.1 MMOL/L — SIGNIFICANT CHANGE UP (ref 0.5–2)
LEUKOCYTE ESTERASE UR-ACNC: NEGATIVE — SIGNIFICANT CHANGE UP
LIDOCAIN IGE QN: 51 U/L — SIGNIFICANT CHANGE UP (ref 7–60)
LYMPHOCYTES # BLD AUTO: 0.79 K/UL — LOW (ref 1–3.3)
LYMPHOCYTES # BLD MANUAL: 0.84 K/UL — LOW (ref 1–3.3)
LYMPHOCYTES NFR BLD AUTO: 6.5 % — LOW (ref 13–44)
LYMPHOCYTES NFR BLD MANUAL: 6.9 % — LOW (ref 13–44)
MACROCYTES BLD QL: SLIGHT — SIGNIFICANT CHANGE UP
MAGNESIUM SERPL-MCNC: 2.6 MG/DL — SIGNIFICANT CHANGE UP (ref 1.6–2.6)
MCHC RBC-ENTMCNC: 31.5 PG — SIGNIFICANT CHANGE UP (ref 27–34)
MCHC RBC-ENTMCNC: 33.4 G/DL — SIGNIFICANT CHANGE UP (ref 32–36)
MCV RBC AUTO: 94.2 FL — SIGNIFICANT CHANGE UP (ref 80–100)
MONOCYTES # BLD AUTO: 1.13 K/UL — HIGH (ref 0–0.9)
MONOCYTES # BLD MANUAL: 0.64 K/UL — SIGNIFICANT CHANGE UP (ref 0–0.9)
MONOCYTES NFR BLD AUTO: 9.2 % — SIGNIFICANT CHANGE UP (ref 2–14)
MONOCYTES NFR BLD MANUAL: 5.2 % — SIGNIFICANT CHANGE UP (ref 2–14)
NEUTROPHILS # BLD AUTO: 10.14 K/UL — HIGH (ref 1.8–7.4)
NEUTROPHILS # BLD MANUAL: 10.74 K/UL — HIGH (ref 1.8–7.4)
NEUTROPHILS NFR BLD AUTO: 83.1 % — HIGH (ref 43–77)
NEUTROPHILS NFR BLD MANUAL: 87.9 % — HIGH (ref 43–77)
NITRITE UR-MCNC: NEGATIVE — SIGNIFICANT CHANGE UP
NRBC # BLD AUTO: 0 K/UL — SIGNIFICANT CHANGE UP (ref 0–0)
NRBC # FLD: 0 K/UL — SIGNIFICANT CHANGE UP (ref 0–0)
NRBC BLD AUTO-RTO: 0 /100 WBCS — SIGNIFICANT CHANGE UP (ref 0–0)
OVALOCYTES BLD QL SMEAR: SLIGHT — SIGNIFICANT CHANGE UP
PH UR: 8 — SIGNIFICANT CHANGE UP (ref 5–8)
PLAT MORPH BLD: NORMAL — SIGNIFICANT CHANGE UP
PLATELET # BLD AUTO: 195 K/UL — SIGNIFICANT CHANGE UP (ref 150–400)
PMV BLD: 10.9 FL — SIGNIFICANT CHANGE UP (ref 7–13)
POIKILOCYTOSIS BLD QL AUTO: SLIGHT — SIGNIFICANT CHANGE UP
POTASSIUM SERPL-MCNC: 5 MMOL/L — SIGNIFICANT CHANGE UP (ref 3.5–5.3)
POTASSIUM SERPL-SCNC: 5 MMOL/L — SIGNIFICANT CHANGE UP (ref 3.5–5.3)
PROT SERPL-MCNC: 6.5 G/DL — SIGNIFICANT CHANGE UP (ref 6–8.3)
PROT UR-MCNC: NEGATIVE MG/DL — SIGNIFICANT CHANGE UP
RBC # BLD: 3.46 M/UL — LOW (ref 3.8–5.2)
RBC # FLD: 14.6 % — HIGH (ref 10.3–14.5)
RBC BLD AUTO: ABNORMAL
RH IG SCN BLD-IMP: POSITIVE — SIGNIFICANT CHANGE UP
SODIUM SERPL-SCNC: 136 MMOL/L — SIGNIFICANT CHANGE UP (ref 135–145)
SP GR SPEC: 1.01 — SIGNIFICANT CHANGE UP (ref 1–1.03)
UROBILINOGEN FLD QL: 0.2 MG/DL — SIGNIFICANT CHANGE UP (ref 0.2–1)
WBC # BLD: 12.22 K/UL — HIGH (ref 3.8–10.5)
WBC # FLD AUTO: 12.22 K/UL — HIGH (ref 3.8–10.5)

## 2025-08-02 PROCEDURE — 86850 RBC ANTIBODY SCREEN: CPT

## 2025-08-02 PROCEDURE — 83735 ASSAY OF MAGNESIUM: CPT

## 2025-08-02 PROCEDURE — 74018 RADEX ABDOMEN 1 VIEW: CPT

## 2025-08-02 PROCEDURE — 83605 ASSAY OF LACTIC ACID: CPT

## 2025-08-02 PROCEDURE — 99222 1ST HOSP IP/OBS MODERATE 55: CPT

## 2025-08-02 PROCEDURE — 36415 COLL VENOUS BLD VENIPUNCTURE: CPT

## 2025-08-02 PROCEDURE — 81003 URINALYSIS AUTO W/O SCOPE: CPT

## 2025-08-02 PROCEDURE — 71046 X-RAY EXAM CHEST 2 VIEWS: CPT | Mod: 26

## 2025-08-02 PROCEDURE — 80053 COMPREHEN METABOLIC PANEL: CPT

## 2025-08-02 PROCEDURE — 99285 EMERGENCY DEPT VISIT HI MDM: CPT

## 2025-08-02 PROCEDURE — 74177 CT ABD & PELVIS W/CONTRAST: CPT | Mod: 26

## 2025-08-02 PROCEDURE — 74177 CT ABD & PELVIS W/CONTRAST: CPT

## 2025-08-02 PROCEDURE — 71046 X-RAY EXAM CHEST 2 VIEWS: CPT

## 2025-08-02 PROCEDURE — 85025 COMPLETE CBC W/AUTO DIFF WBC: CPT

## 2025-08-02 PROCEDURE — 74018 RADEX ABDOMEN 1 VIEW: CPT | Mod: 26

## 2025-08-02 PROCEDURE — 83690 ASSAY OF LIPASE: CPT

## 2025-08-02 PROCEDURE — 86900 BLOOD TYPING SEROLOGIC ABO: CPT

## 2025-08-02 PROCEDURE — 93010 ELECTROCARDIOGRAM REPORT: CPT

## 2025-08-02 PROCEDURE — 86901 BLOOD TYPING SEROLOGIC RH(D): CPT

## 2025-08-02 RX ORDER — LEVOTHYROXINE SODIUM 300 MCG
100 TABLET ORAL DAILY
Refills: 0 | Status: DISCONTINUED | OUTPATIENT
Start: 2025-08-02 | End: 2025-08-09

## 2025-08-02 RX ORDER — LOSARTAN POTASSIUM 100 MG/1
100 TABLET, FILM COATED ORAL DAILY
Refills: 0 | Status: DISCONTINUED | OUTPATIENT
Start: 2025-08-02 | End: 2025-08-09

## 2025-08-02 RX ORDER — ONDANSETRON HCL/PF 4 MG/2 ML
4 VIAL (ML) INJECTION ONCE
Refills: 0 | Status: COMPLETED | OUTPATIENT
Start: 2025-08-02 | End: 2025-08-02

## 2025-08-02 RX ORDER — ACETAMINOPHEN 500 MG/5ML
1000 LIQUID (ML) ORAL ONCE
Refills: 0 | Status: COMPLETED | OUTPATIENT
Start: 2025-08-02 | End: 2025-08-02

## 2025-08-02 RX ORDER — DIATRIZOATE MEGLUMINE, SODIUM 66 %-10 %
50 VIAL (ML) INJECTION ONCE
Refills: 0 | Status: DISCONTINUED | OUTPATIENT
Start: 2025-08-02 | End: 2025-08-09

## 2025-08-02 RX ORDER — SODIUM CHLORIDE 9 G/1000ML
1000 INJECTION, SOLUTION INTRAVENOUS
Refills: 0 | Status: DISCONTINUED | OUTPATIENT
Start: 2025-08-02 | End: 2025-08-03

## 2025-08-02 RX ORDER — ENOXAPARIN SODIUM 100 MG/ML
30 INJECTION SUBCUTANEOUS EVERY 24 HOURS
Refills: 0 | Status: DISCONTINUED | OUTPATIENT
Start: 2025-08-02 | End: 2025-08-07

## 2025-08-02 RX ORDER — SODIUM CHLORIDE 9 G/1000ML
1000 INJECTION, SOLUTION INTRAVENOUS ONCE
Refills: 0 | Status: COMPLETED | OUTPATIENT
Start: 2025-08-02 | End: 2025-08-02

## 2025-08-02 RX ORDER — ENOXAPARIN SODIUM 100 MG/ML
40 INJECTION SUBCUTANEOUS EVERY 24 HOURS
Refills: 0 | Status: DISCONTINUED | OUTPATIENT
Start: 2025-08-02 | End: 2025-08-02

## 2025-08-02 RX ADMIN — SODIUM CHLORIDE 100 MILLILITER(S): 9 INJECTION, SOLUTION INTRAVENOUS at 16:08

## 2025-08-02 RX ADMIN — Medication 400 MILLIGRAM(S): at 12:10

## 2025-08-02 RX ADMIN — SODIUM CHLORIDE 1000 MILLILITER(S): 9 INJECTION, SOLUTION INTRAVENOUS at 12:10

## 2025-08-02 RX ADMIN — Medication 1000 MILLIGRAM(S): at 15:59

## 2025-08-02 RX ADMIN — Medication 4 MILLIGRAM(S): at 12:10

## 2025-08-03 LAB
ALBUMIN SERPL ELPH-MCNC: 3.6 G/DL — SIGNIFICANT CHANGE UP (ref 3.3–5)
ALP SERPL-CCNC: 56 U/L — SIGNIFICANT CHANGE UP (ref 40–120)
ALT FLD-CCNC: 12 U/L — SIGNIFICANT CHANGE UP (ref 10–45)
ANION GAP SERPL CALC-SCNC: 11 MMOL/L — SIGNIFICANT CHANGE UP (ref 5–17)
AST SERPL-CCNC: 16 U/L — SIGNIFICANT CHANGE UP (ref 10–40)
BILIRUB SERPL-MCNC: 0.2 MG/DL — SIGNIFICANT CHANGE UP (ref 0.2–1.2)
BUN SERPL-MCNC: 34 MG/DL — HIGH (ref 7–23)
CALCIUM SERPL-MCNC: 8.9 MG/DL — SIGNIFICANT CHANGE UP (ref 8.4–10.5)
CEA SERPL-MCNC: 6.2 NG/ML — HIGH (ref 0–3.8)
CHLORIDE SERPL-SCNC: 107 MMOL/L — SIGNIFICANT CHANGE UP (ref 96–108)
CO2 SERPL-SCNC: 22 MMOL/L — SIGNIFICANT CHANGE UP (ref 22–31)
CREAT SERPL-MCNC: 1.47 MG/DL — HIGH (ref 0.5–1.3)
EGFR: 33 ML/MIN/1.73M2 — LOW
EGFR: 33 ML/MIN/1.73M2 — LOW
GLUCOSE SERPL-MCNC: 171 MG/DL — HIGH (ref 70–99)
HCT VFR BLD CALC: 29.3 % — LOW (ref 34.5–45)
HGB BLD-MCNC: 9.4 G/DL — LOW (ref 11.5–15.5)
MAGNESIUM SERPL-MCNC: 2.4 MG/DL — SIGNIFICANT CHANGE UP (ref 1.6–2.6)
MCHC RBC-ENTMCNC: 31.1 PG — SIGNIFICANT CHANGE UP (ref 27–34)
MCHC RBC-ENTMCNC: 32.1 G/DL — SIGNIFICANT CHANGE UP (ref 32–36)
MCV RBC AUTO: 97 FL — SIGNIFICANT CHANGE UP (ref 80–100)
NRBC # BLD AUTO: 0 K/UL — SIGNIFICANT CHANGE UP (ref 0–0)
NRBC # FLD: 0 K/UL — SIGNIFICANT CHANGE UP (ref 0–0)
NRBC BLD AUTO-RTO: 0 /100 WBCS — SIGNIFICANT CHANGE UP (ref 0–0)
PHOSPHATE SERPL-MCNC: 3.6 MG/DL — SIGNIFICANT CHANGE UP (ref 2.5–4.5)
PLATELET # BLD AUTO: 168 K/UL — SIGNIFICANT CHANGE UP (ref 150–400)
PMV BLD: 10.3 FL — SIGNIFICANT CHANGE UP (ref 7–13)
POTASSIUM SERPL-MCNC: 5 MMOL/L — SIGNIFICANT CHANGE UP (ref 3.5–5.3)
POTASSIUM SERPL-SCNC: 5 MMOL/L — SIGNIFICANT CHANGE UP (ref 3.5–5.3)
PROT SERPL-MCNC: 5.5 G/DL — LOW (ref 6–8.3)
RBC # BLD: 3.02 M/UL — LOW (ref 3.8–5.2)
RBC # FLD: 15.1 % — HIGH (ref 10.3–14.5)
SODIUM SERPL-SCNC: 140 MMOL/L — SIGNIFICANT CHANGE UP (ref 135–145)
WBC # BLD: 8.67 K/UL — SIGNIFICANT CHANGE UP (ref 3.8–10.5)
WBC # FLD AUTO: 8.67 K/UL — SIGNIFICANT CHANGE UP (ref 3.8–10.5)

## 2025-08-03 PROCEDURE — 99232 SBSQ HOSP IP/OBS MODERATE 35: CPT

## 2025-08-03 PROCEDURE — 74018 RADEX ABDOMEN 1 VIEW: CPT

## 2025-08-03 PROCEDURE — 85027 COMPLETE CBC AUTOMATED: CPT

## 2025-08-03 PROCEDURE — 83605 ASSAY OF LACTIC ACID: CPT

## 2025-08-03 PROCEDURE — 93005 ELECTROCARDIOGRAM TRACING: CPT

## 2025-08-03 PROCEDURE — 84100 ASSAY OF PHOSPHORUS: CPT

## 2025-08-03 PROCEDURE — 86901 BLOOD TYPING SEROLOGIC RH(D): CPT

## 2025-08-03 PROCEDURE — 71046 X-RAY EXAM CHEST 2 VIEWS: CPT

## 2025-08-03 PROCEDURE — 83690 ASSAY OF LIPASE: CPT

## 2025-08-03 PROCEDURE — 81003 URINALYSIS AUTO W/O SCOPE: CPT

## 2025-08-03 PROCEDURE — 83735 ASSAY OF MAGNESIUM: CPT

## 2025-08-03 PROCEDURE — 82378 CARCINOEMBRYONIC ANTIGEN: CPT

## 2025-08-03 PROCEDURE — 97161 PT EVAL LOW COMPLEX 20 MIN: CPT

## 2025-08-03 PROCEDURE — 74177 CT ABD & PELVIS W/CONTRAST: CPT

## 2025-08-03 PROCEDURE — 36415 COLL VENOUS BLD VENIPUNCTURE: CPT

## 2025-08-03 PROCEDURE — 80053 COMPREHEN METABOLIC PANEL: CPT

## 2025-08-03 PROCEDURE — 86850 RBC ANTIBODY SCREEN: CPT

## 2025-08-03 PROCEDURE — 85025 COMPLETE CBC W/AUTO DIFF WBC: CPT

## 2025-08-03 PROCEDURE — 86900 BLOOD TYPING SEROLOGIC ABO: CPT

## 2025-08-03 PROCEDURE — 74018 RADEX ABDOMEN 1 VIEW: CPT | Mod: 26

## 2025-08-03 RX ADMIN — ENOXAPARIN SODIUM 30 MILLIGRAM(S): 100 INJECTION SUBCUTANEOUS at 17:58

## 2025-08-03 RX ADMIN — Medication 100 MICROGRAM(S): at 05:08

## 2025-08-03 RX ADMIN — LOSARTAN POTASSIUM 100 MILLIGRAM(S): 100 TABLET, FILM COATED ORAL at 05:07

## 2025-08-04 ENCOUNTER — RESULT REVIEW (OUTPATIENT)
Age: 89
End: 2025-08-04

## 2025-08-04 DIAGNOSIS — I10 ESSENTIAL (PRIMARY) HYPERTENSION: ICD-10-CM

## 2025-08-04 DIAGNOSIS — E03.9 HYPOTHYROIDISM, UNSPECIFIED: ICD-10-CM

## 2025-08-04 DIAGNOSIS — Z71.89 OTHER SPECIFIED COUNSELING: ICD-10-CM

## 2025-08-04 DIAGNOSIS — K56.609 UNSPECIFIED INTESTINAL OBSTRUCTION, UNSPECIFIED AS TO PARTIAL VERSUS COMPLETE OBSTRUCTION: ICD-10-CM

## 2025-08-04 LAB
ALBUMIN SERPL ELPH-MCNC: 3.3 G/DL — SIGNIFICANT CHANGE UP (ref 3.3–5)
ALP SERPL-CCNC: 49 U/L — SIGNIFICANT CHANGE UP (ref 40–120)
ALT FLD-CCNC: 9 U/L — LOW (ref 10–45)
ANION GAP SERPL CALC-SCNC: 8 MMOL/L — SIGNIFICANT CHANGE UP (ref 5–17)
AST SERPL-CCNC: 14 U/L — SIGNIFICANT CHANGE UP (ref 10–40)
BILIRUB SERPL-MCNC: 0.2 MG/DL — SIGNIFICANT CHANGE UP (ref 0.2–1.2)
BUN SERPL-MCNC: 33 MG/DL — HIGH (ref 7–23)
CALCIUM SERPL-MCNC: 8.6 MG/DL — SIGNIFICANT CHANGE UP (ref 8.4–10.5)
CHLORIDE SERPL-SCNC: 111 MMOL/L — HIGH (ref 96–108)
CO2 SERPL-SCNC: 22 MMOL/L — SIGNIFICANT CHANGE UP (ref 22–31)
CREAT SERPL-MCNC: 1.4 MG/DL — HIGH (ref 0.5–1.3)
EGFR: 35 ML/MIN/1.73M2 — LOW
EGFR: 35 ML/MIN/1.73M2 — LOW
GLUCOSE SERPL-MCNC: 90 MG/DL — SIGNIFICANT CHANGE UP (ref 70–99)
HCT VFR BLD CALC: 29 % — LOW (ref 34.5–45)
HGB BLD-MCNC: 9.4 G/DL — LOW (ref 11.5–15.5)
IMMATURE PLATELET FRACTION #: 11.6 K/UL — HIGH (ref 4.7–11.1)
IMMATURE PLATELET FRACTION %: 12.5 % — HIGH (ref 1.6–4.9)
MAGNESIUM SERPL-MCNC: 2.2 MG/DL — SIGNIFICANT CHANGE UP (ref 1.6–2.6)
MCHC RBC-ENTMCNC: 31.4 PG — SIGNIFICANT CHANGE UP (ref 27–34)
MCHC RBC-ENTMCNC: 32.4 G/DL — SIGNIFICANT CHANGE UP (ref 32–36)
MCV RBC AUTO: 97 FL — SIGNIFICANT CHANGE UP (ref 80–100)
NRBC # BLD AUTO: 0 K/UL — SIGNIFICANT CHANGE UP (ref 0–0)
NRBC # FLD: 0 K/UL — SIGNIFICANT CHANGE UP (ref 0–0)
NRBC BLD AUTO-RTO: 0 /100 WBCS — SIGNIFICANT CHANGE UP (ref 0–0)
PHOSPHATE SERPL-MCNC: 2.5 MG/DL — SIGNIFICANT CHANGE UP (ref 2.5–4.5)
PLATELET # BLD AUTO: 93 K/UL — LOW (ref 150–400)
PMV BLD: 11.8 FL — SIGNIFICANT CHANGE UP (ref 7–13)
POTASSIUM SERPL-MCNC: 4.7 MMOL/L — SIGNIFICANT CHANGE UP (ref 3.5–5.3)
POTASSIUM SERPL-SCNC: 4.7 MMOL/L — SIGNIFICANT CHANGE UP (ref 3.5–5.3)
PROT SERPL-MCNC: 5.2 G/DL — LOW (ref 6–8.3)
RBC # BLD: 2.99 M/UL — LOW (ref 3.8–5.2)
RBC # FLD: 15.2 % — HIGH (ref 10.3–14.5)
SODIUM SERPL-SCNC: 141 MMOL/L — SIGNIFICANT CHANGE UP (ref 135–145)
WBC # BLD: 7.32 K/UL — SIGNIFICANT CHANGE UP (ref 3.8–10.5)
WBC # FLD AUTO: 7.32 K/UL — SIGNIFICANT CHANGE UP (ref 3.8–10.5)

## 2025-08-04 PROCEDURE — 81003 URINALYSIS AUTO W/O SCOPE: CPT

## 2025-08-04 PROCEDURE — 71046 X-RAY EXAM CHEST 2 VIEWS: CPT

## 2025-08-04 PROCEDURE — 86901 BLOOD TYPING SEROLOGIC RH(D): CPT

## 2025-08-04 PROCEDURE — 84100 ASSAY OF PHOSPHORUS: CPT

## 2025-08-04 PROCEDURE — 74176 CT ABD & PELVIS W/O CONTRAST: CPT

## 2025-08-04 PROCEDURE — 74176 CT ABD & PELVIS W/O CONTRAST: CPT | Mod: 26

## 2025-08-04 PROCEDURE — 85027 COMPLETE CBC AUTOMATED: CPT

## 2025-08-04 PROCEDURE — 83735 ASSAY OF MAGNESIUM: CPT

## 2025-08-04 PROCEDURE — 83690 ASSAY OF LIPASE: CPT

## 2025-08-04 PROCEDURE — 99232 SBSQ HOSP IP/OBS MODERATE 35: CPT

## 2025-08-04 PROCEDURE — 74018 RADEX ABDOMEN 1 VIEW: CPT

## 2025-08-04 PROCEDURE — 74177 CT ABD & PELVIS W/CONTRAST: CPT

## 2025-08-04 PROCEDURE — 99223 1ST HOSP IP/OBS HIGH 75: CPT

## 2025-08-04 PROCEDURE — 93356 MYOCRD STRAIN IMG SPCKL TRCK: CPT

## 2025-08-04 PROCEDURE — 93005 ELECTROCARDIOGRAM TRACING: CPT

## 2025-08-04 PROCEDURE — 36415 COLL VENOUS BLD VENIPUNCTURE: CPT

## 2025-08-04 PROCEDURE — 86900 BLOOD TYPING SEROLOGIC ABO: CPT

## 2025-08-04 PROCEDURE — 93306 TTE W/DOPPLER COMPLETE: CPT | Mod: 26

## 2025-08-04 PROCEDURE — 85025 COMPLETE CBC W/AUTO DIFF WBC: CPT

## 2025-08-04 PROCEDURE — 97161 PT EVAL LOW COMPLEX 20 MIN: CPT

## 2025-08-04 PROCEDURE — 82378 CARCINOEMBRYONIC ANTIGEN: CPT

## 2025-08-04 PROCEDURE — 83605 ASSAY OF LACTIC ACID: CPT

## 2025-08-04 PROCEDURE — 86850 RBC ANTIBODY SCREEN: CPT

## 2025-08-04 PROCEDURE — 80053 COMPREHEN METABOLIC PANEL: CPT

## 2025-08-04 RX ORDER — SODIUM PHOSPHATE,DIBASIC DIHYD
30 POWDER (GRAM) MISCELLANEOUS ONCE
Refills: 0 | Status: COMPLETED | OUTPATIENT
Start: 2025-08-04 | End: 2025-08-04

## 2025-08-04 RX ADMIN — LOSARTAN POTASSIUM 100 MILLIGRAM(S): 100 TABLET, FILM COATED ORAL at 05:58

## 2025-08-04 RX ADMIN — Medication 100 MICROGRAM(S): at 05:58

## 2025-08-04 RX ADMIN — Medication 85 MILLIMOLE(S): at 17:23

## 2025-08-04 RX ADMIN — ENOXAPARIN SODIUM 30 MILLIGRAM(S): 100 INJECTION SUBCUTANEOUS at 17:24

## 2025-08-05 LAB
ANION GAP SERPL CALC-SCNC: 12 MMOL/L — SIGNIFICANT CHANGE UP (ref 5–17)
BUN SERPL-MCNC: 35 MG/DL — HIGH (ref 7–23)
CALCIUM SERPL-MCNC: 8.9 MG/DL — SIGNIFICANT CHANGE UP (ref 8.4–10.5)
CHLORIDE SERPL-SCNC: 109 MMOL/L — HIGH (ref 96–108)
CO2 SERPL-SCNC: 20 MMOL/L — LOW (ref 22–31)
CREAT SERPL-MCNC: 1.21 MG/DL — SIGNIFICANT CHANGE UP (ref 0.5–1.3)
EGFR: 41 ML/MIN/1.73M2 — LOW
EGFR: 41 ML/MIN/1.73M2 — LOW
GLUCOSE SERPL-MCNC: 73 MG/DL — SIGNIFICANT CHANGE UP (ref 70–99)
HCT VFR BLD CALC: 27.1 % — LOW (ref 34.5–45)
HGB BLD-MCNC: 8.7 G/DL — LOW (ref 11.5–15.5)
MAGNESIUM SERPL-MCNC: 2.2 MG/DL — SIGNIFICANT CHANGE UP (ref 1.6–2.6)
MCHC RBC-ENTMCNC: 31.1 PG — SIGNIFICANT CHANGE UP (ref 27–34)
MCHC RBC-ENTMCNC: 32.1 G/DL — SIGNIFICANT CHANGE UP (ref 32–36)
MCV RBC AUTO: 96.8 FL — SIGNIFICANT CHANGE UP (ref 80–100)
NRBC # BLD AUTO: 0 K/UL — SIGNIFICANT CHANGE UP (ref 0–0)
NRBC # FLD: 0 K/UL — SIGNIFICANT CHANGE UP (ref 0–0)
NRBC BLD AUTO-RTO: 0 /100 WBCS — SIGNIFICANT CHANGE UP (ref 0–0)
PHOSPHATE SERPL-MCNC: 4.6 MG/DL — HIGH (ref 2.5–4.5)
PLATELET # BLD AUTO: 132 K/UL — LOW (ref 150–400)
PMV BLD: 12.4 FL — SIGNIFICANT CHANGE UP (ref 7–13)
POTASSIUM SERPL-MCNC: 5 MMOL/L — SIGNIFICANT CHANGE UP (ref 3.5–5.3)
POTASSIUM SERPL-SCNC: 5 MMOL/L — SIGNIFICANT CHANGE UP (ref 3.5–5.3)
RBC # BLD: 2.8 M/UL — LOW (ref 3.8–5.2)
RBC # FLD: 15 % — HIGH (ref 10.3–14.5)
SODIUM SERPL-SCNC: 141 MMOL/L — SIGNIFICANT CHANGE UP (ref 135–145)
WBC # BLD: 6.6 K/UL — SIGNIFICANT CHANGE UP (ref 3.8–10.5)
WBC # FLD AUTO: 6.6 K/UL — SIGNIFICANT CHANGE UP (ref 3.8–10.5)

## 2025-08-05 PROCEDURE — 80053 COMPREHEN METABOLIC PANEL: CPT

## 2025-08-05 PROCEDURE — 85025 COMPLETE CBC W/AUTO DIFF WBC: CPT

## 2025-08-05 PROCEDURE — 83605 ASSAY OF LACTIC ACID: CPT

## 2025-08-05 PROCEDURE — 86900 BLOOD TYPING SEROLOGIC ABO: CPT

## 2025-08-05 PROCEDURE — 81003 URINALYSIS AUTO W/O SCOPE: CPT

## 2025-08-05 PROCEDURE — 93356 MYOCRD STRAIN IMG SPCKL TRCK: CPT

## 2025-08-05 PROCEDURE — 86850 RBC ANTIBODY SCREEN: CPT

## 2025-08-05 PROCEDURE — 84100 ASSAY OF PHOSPHORUS: CPT

## 2025-08-05 PROCEDURE — 97161 PT EVAL LOW COMPLEX 20 MIN: CPT

## 2025-08-05 PROCEDURE — 99232 SBSQ HOSP IP/OBS MODERATE 35: CPT

## 2025-08-05 PROCEDURE — 74177 CT ABD & PELVIS W/CONTRAST: CPT

## 2025-08-05 PROCEDURE — 93005 ELECTROCARDIOGRAM TRACING: CPT

## 2025-08-05 PROCEDURE — 83735 ASSAY OF MAGNESIUM: CPT

## 2025-08-05 PROCEDURE — 80048 BASIC METABOLIC PNL TOTAL CA: CPT

## 2025-08-05 PROCEDURE — 93306 TTE W/DOPPLER COMPLETE: CPT

## 2025-08-05 PROCEDURE — 74018 RADEX ABDOMEN 1 VIEW: CPT

## 2025-08-05 PROCEDURE — 36415 COLL VENOUS BLD VENIPUNCTURE: CPT

## 2025-08-05 PROCEDURE — 82378 CARCINOEMBRYONIC ANTIGEN: CPT

## 2025-08-05 PROCEDURE — 83690 ASSAY OF LIPASE: CPT

## 2025-08-05 PROCEDURE — 85027 COMPLETE CBC AUTOMATED: CPT

## 2025-08-05 PROCEDURE — 74176 CT ABD & PELVIS W/O CONTRAST: CPT

## 2025-08-05 PROCEDURE — 86901 BLOOD TYPING SEROLOGIC RH(D): CPT

## 2025-08-05 PROCEDURE — 71046 X-RAY EXAM CHEST 2 VIEWS: CPT

## 2025-08-05 RX ORDER — METOCLOPRAMIDE HCL 10 MG
5 TABLET ORAL EVERY 6 HOURS
Refills: 0 | Status: COMPLETED | OUTPATIENT
Start: 2025-08-05 | End: 2025-08-07

## 2025-08-05 RX ADMIN — LOSARTAN POTASSIUM 100 MILLIGRAM(S): 100 TABLET, FILM COATED ORAL at 05:34

## 2025-08-05 RX ADMIN — Medication 100 MICROGRAM(S): at 05:34

## 2025-08-05 RX ADMIN — Medication 5 MILLIGRAM(S): at 19:36

## 2025-08-05 RX ADMIN — ENOXAPARIN SODIUM 30 MILLIGRAM(S): 100 INJECTION SUBCUTANEOUS at 19:36

## 2025-08-06 LAB
ANION GAP SERPL CALC-SCNC: 10 MMOL/L — SIGNIFICANT CHANGE UP (ref 5–17)
BUN SERPL-MCNC: 38 MG/DL — HIGH (ref 7–23)
CALCIUM SERPL-MCNC: 9.2 MG/DL — SIGNIFICANT CHANGE UP (ref 8.4–10.5)
CHLORIDE SERPL-SCNC: 108 MMOL/L — SIGNIFICANT CHANGE UP (ref 96–108)
CO2 SERPL-SCNC: 22 MMOL/L — SIGNIFICANT CHANGE UP (ref 22–31)
CREAT SERPL-MCNC: 1.2 MG/DL — SIGNIFICANT CHANGE UP (ref 0.5–1.3)
EGFR: 42 ML/MIN/1.73M2 — LOW
EGFR: 42 ML/MIN/1.73M2 — LOW
GLUCOSE SERPL-MCNC: 90 MG/DL — SIGNIFICANT CHANGE UP (ref 70–99)
HCT VFR BLD CALC: 28.2 % — LOW (ref 34.5–45)
HGB BLD-MCNC: 9.1 G/DL — LOW (ref 11.5–15.5)
MAGNESIUM SERPL-MCNC: 2 MG/DL — SIGNIFICANT CHANGE UP (ref 1.6–2.6)
MCHC RBC-ENTMCNC: 31.2 PG — SIGNIFICANT CHANGE UP (ref 27–34)
MCHC RBC-ENTMCNC: 32.3 G/DL — SIGNIFICANT CHANGE UP (ref 32–36)
MCV RBC AUTO: 96.6 FL — SIGNIFICANT CHANGE UP (ref 80–100)
NRBC # BLD AUTO: 0 K/UL — SIGNIFICANT CHANGE UP (ref 0–0)
NRBC # FLD: 0 K/UL — SIGNIFICANT CHANGE UP (ref 0–0)
NRBC BLD AUTO-RTO: 0 /100 WBCS — SIGNIFICANT CHANGE UP (ref 0–0)
PHOSPHATE SERPL-MCNC: 4 MG/DL — SIGNIFICANT CHANGE UP (ref 2.5–4.5)
PLATELET # BLD AUTO: 141 K/UL — LOW (ref 150–400)
PMV BLD: 11.8 FL — SIGNIFICANT CHANGE UP (ref 7–13)
POTASSIUM SERPL-MCNC: 4.5 MMOL/L — SIGNIFICANT CHANGE UP (ref 3.5–5.3)
POTASSIUM SERPL-SCNC: 4.5 MMOL/L — SIGNIFICANT CHANGE UP (ref 3.5–5.3)
RBC # BLD: 2.92 M/UL — LOW (ref 3.8–5.2)
RBC # FLD: 14.8 % — HIGH (ref 10.3–14.5)
SODIUM SERPL-SCNC: 140 MMOL/L — SIGNIFICANT CHANGE UP (ref 135–145)
WBC # BLD: 6.67 K/UL — SIGNIFICANT CHANGE UP (ref 3.8–10.5)
WBC # FLD AUTO: 6.67 K/UL — SIGNIFICANT CHANGE UP (ref 3.8–10.5)

## 2025-08-06 PROCEDURE — 85025 COMPLETE CBC W/AUTO DIFF WBC: CPT

## 2025-08-06 PROCEDURE — 74177 CT ABD & PELVIS W/CONTRAST: CPT

## 2025-08-06 PROCEDURE — 83735 ASSAY OF MAGNESIUM: CPT

## 2025-08-06 PROCEDURE — 83690 ASSAY OF LIPASE: CPT

## 2025-08-06 PROCEDURE — 93005 ELECTROCARDIOGRAM TRACING: CPT

## 2025-08-06 PROCEDURE — 71046 X-RAY EXAM CHEST 2 VIEWS: CPT

## 2025-08-06 PROCEDURE — 86850 RBC ANTIBODY SCREEN: CPT

## 2025-08-06 PROCEDURE — 99233 SBSQ HOSP IP/OBS HIGH 50: CPT

## 2025-08-06 PROCEDURE — 93306 TTE W/DOPPLER COMPLETE: CPT

## 2025-08-06 PROCEDURE — 97161 PT EVAL LOW COMPLEX 20 MIN: CPT

## 2025-08-06 PROCEDURE — 93356 MYOCRD STRAIN IMG SPCKL TRCK: CPT

## 2025-08-06 PROCEDURE — 86901 BLOOD TYPING SEROLOGIC RH(D): CPT

## 2025-08-06 PROCEDURE — 74018 RADEX ABDOMEN 1 VIEW: CPT

## 2025-08-06 PROCEDURE — 85027 COMPLETE CBC AUTOMATED: CPT

## 2025-08-06 PROCEDURE — 74176 CT ABD & PELVIS W/O CONTRAST: CPT

## 2025-08-06 PROCEDURE — 74018 RADEX ABDOMEN 1 VIEW: CPT | Mod: 26

## 2025-08-06 PROCEDURE — 81003 URINALYSIS AUTO W/O SCOPE: CPT

## 2025-08-06 PROCEDURE — 82378 CARCINOEMBRYONIC ANTIGEN: CPT

## 2025-08-06 PROCEDURE — 84100 ASSAY OF PHOSPHORUS: CPT

## 2025-08-06 PROCEDURE — 36415 COLL VENOUS BLD VENIPUNCTURE: CPT

## 2025-08-06 PROCEDURE — 83605 ASSAY OF LACTIC ACID: CPT

## 2025-08-06 PROCEDURE — 86900 BLOOD TYPING SEROLOGIC ABO: CPT

## 2025-08-06 PROCEDURE — 80048 BASIC METABOLIC PNL TOTAL CA: CPT

## 2025-08-06 PROCEDURE — 80053 COMPREHEN METABOLIC PANEL: CPT

## 2025-08-06 RX ADMIN — Medication 5 MILLIGRAM(S): at 12:38

## 2025-08-06 RX ADMIN — Medication 5 MILLIGRAM(S): at 23:36

## 2025-08-06 RX ADMIN — Medication 5 MILLIGRAM(S): at 17:46

## 2025-08-06 RX ADMIN — LOSARTAN POTASSIUM 100 MILLIGRAM(S): 100 TABLET, FILM COATED ORAL at 05:29

## 2025-08-06 RX ADMIN — Medication 5 MILLIGRAM(S): at 01:05

## 2025-08-06 RX ADMIN — Medication 5 MILLIGRAM(S): at 06:09

## 2025-08-06 RX ADMIN — ENOXAPARIN SODIUM 30 MILLIGRAM(S): 100 INJECTION SUBCUTANEOUS at 17:46

## 2025-08-06 RX ADMIN — Medication 100 MICROGRAM(S): at 05:29

## 2025-08-07 LAB
ANION GAP SERPL CALC-SCNC: 12 MMOL/L — SIGNIFICANT CHANGE UP (ref 5–17)
BUN SERPL-MCNC: 52 MG/DL — HIGH (ref 7–23)
CALCIUM SERPL-MCNC: 9 MG/DL — SIGNIFICANT CHANGE UP (ref 8.4–10.5)
CHLORIDE SERPL-SCNC: 109 MMOL/L — HIGH (ref 96–108)
CO2 SERPL-SCNC: 19 MMOL/L — LOW (ref 22–31)
CREAT SERPL-MCNC: 1.41 MG/DL — HIGH (ref 0.5–1.3)
EGFR: 34 ML/MIN/1.73M2 — LOW
EGFR: 34 ML/MIN/1.73M2 — LOW
GLUCOSE SERPL-MCNC: 82 MG/DL — SIGNIFICANT CHANGE UP (ref 70–99)
HCT VFR BLD CALC: 26.7 % — LOW (ref 34.5–45)
HGB BLD-MCNC: 8.8 G/DL — LOW (ref 11.5–15.5)
MCHC RBC-ENTMCNC: 31.9 PG — SIGNIFICANT CHANGE UP (ref 27–34)
MCHC RBC-ENTMCNC: 33 G/DL — SIGNIFICANT CHANGE UP (ref 32–36)
MCV RBC AUTO: 96.7 FL — SIGNIFICANT CHANGE UP (ref 80–100)
NRBC # BLD AUTO: 0 K/UL — SIGNIFICANT CHANGE UP (ref 0–0)
NRBC # FLD: 0 K/UL — SIGNIFICANT CHANGE UP (ref 0–0)
NRBC BLD AUTO-RTO: 0 /100 WBCS — SIGNIFICANT CHANGE UP (ref 0–0)
PLATELET # BLD AUTO: 135 K/UL — LOW (ref 150–400)
PMV BLD: 12.3 FL — SIGNIFICANT CHANGE UP (ref 7–13)
POTASSIUM SERPL-MCNC: 4.5 MMOL/L — SIGNIFICANT CHANGE UP (ref 3.5–5.3)
POTASSIUM SERPL-SCNC: 4.5 MMOL/L — SIGNIFICANT CHANGE UP (ref 3.5–5.3)
RBC # BLD: 2.76 M/UL — LOW (ref 3.8–5.2)
RBC # FLD: 15 % — HIGH (ref 10.3–14.5)
SODIUM SERPL-SCNC: 140 MMOL/L — SIGNIFICANT CHANGE UP (ref 135–145)
WBC # BLD: 6.55 K/UL — SIGNIFICANT CHANGE UP (ref 3.8–10.5)
WBC # FLD AUTO: 6.55 K/UL — SIGNIFICANT CHANGE UP (ref 3.8–10.5)

## 2025-08-07 PROCEDURE — 85027 COMPLETE CBC AUTOMATED: CPT

## 2025-08-07 PROCEDURE — 93005 ELECTROCARDIOGRAM TRACING: CPT

## 2025-08-07 PROCEDURE — 71046 X-RAY EXAM CHEST 2 VIEWS: CPT

## 2025-08-07 PROCEDURE — 93306 TTE W/DOPPLER COMPLETE: CPT

## 2025-08-07 PROCEDURE — 82378 CARCINOEMBRYONIC ANTIGEN: CPT

## 2025-08-07 PROCEDURE — 83735 ASSAY OF MAGNESIUM: CPT

## 2025-08-07 PROCEDURE — 81003 URINALYSIS AUTO W/O SCOPE: CPT

## 2025-08-07 PROCEDURE — 86850 RBC ANTIBODY SCREEN: CPT

## 2025-08-07 PROCEDURE — 74176 CT ABD & PELVIS W/O CONTRAST: CPT

## 2025-08-07 PROCEDURE — 74018 RADEX ABDOMEN 1 VIEW: CPT

## 2025-08-07 PROCEDURE — 83605 ASSAY OF LACTIC ACID: CPT

## 2025-08-07 PROCEDURE — 86900 BLOOD TYPING SEROLOGIC ABO: CPT

## 2025-08-07 PROCEDURE — 83690 ASSAY OF LIPASE: CPT

## 2025-08-07 PROCEDURE — 86901 BLOOD TYPING SEROLOGIC RH(D): CPT

## 2025-08-07 PROCEDURE — 80053 COMPREHEN METABOLIC PANEL: CPT

## 2025-08-07 PROCEDURE — 80048 BASIC METABOLIC PNL TOTAL CA: CPT

## 2025-08-07 PROCEDURE — 93356 MYOCRD STRAIN IMG SPCKL TRCK: CPT

## 2025-08-07 PROCEDURE — 97161 PT EVAL LOW COMPLEX 20 MIN: CPT

## 2025-08-07 PROCEDURE — 99233 SBSQ HOSP IP/OBS HIGH 50: CPT

## 2025-08-07 PROCEDURE — 85025 COMPLETE CBC W/AUTO DIFF WBC: CPT

## 2025-08-07 PROCEDURE — 36415 COLL VENOUS BLD VENIPUNCTURE: CPT

## 2025-08-07 PROCEDURE — 74177 CT ABD & PELVIS W/CONTRAST: CPT

## 2025-08-07 PROCEDURE — 84100 ASSAY OF PHOSPHORUS: CPT

## 2025-08-07 RX ORDER — BISACODYL 5 MG
10 TABLET, DELAYED RELEASE (ENTERIC COATED) ORAL EVERY 4 HOURS
Refills: 0 | Status: COMPLETED | OUTPATIENT
Start: 2025-08-07 | End: 2025-08-07

## 2025-08-07 RX ORDER — POLYETHYLENE GLYCOL-3350 AND ELECTROLYTES 236; 6.74; 5.86; 2.97; 22.74 G/274.31G; G/274.31G; G/274.31G; G/274.31G; G/274.31G
1000 POWDER, FOR SOLUTION ORAL EVERY 4 HOURS
Refills: 0 | Status: COMPLETED | OUTPATIENT
Start: 2025-08-07 | End: 2025-08-07

## 2025-08-07 RX ADMIN — Medication 40 MILLIGRAM(S): at 05:24

## 2025-08-07 RX ADMIN — POLYETHYLENE GLYCOL-3350 AND ELECTROLYTES 1000 MILLILITER(S): 236; 6.74; 5.86; 2.97; 22.74 POWDER, FOR SOLUTION ORAL at 21:26

## 2025-08-07 RX ADMIN — Medication 5 MILLIGRAM(S): at 05:23

## 2025-08-07 RX ADMIN — POLYETHYLENE GLYCOL-3350 AND ELECTROLYTES 1000 MILLILITER(S): 236; 6.74; 5.86; 2.97; 22.74 POWDER, FOR SOLUTION ORAL at 16:50

## 2025-08-07 RX ADMIN — Medication 10 MILLIGRAM(S): at 21:25

## 2025-08-07 RX ADMIN — LOSARTAN POTASSIUM 100 MILLIGRAM(S): 100 TABLET, FILM COATED ORAL at 05:23

## 2025-08-07 RX ADMIN — Medication 100 MICROGRAM(S): at 05:23

## 2025-08-07 RX ADMIN — Medication 10 MILLIGRAM(S): at 16:43

## 2025-08-08 ENCOUNTER — RESULT REVIEW (OUTPATIENT)
Age: 89
End: 2025-08-08

## 2025-08-08 ENCOUNTER — TRANSCRIPTION ENCOUNTER (OUTPATIENT)
Age: 89
End: 2025-08-08

## 2025-08-08 LAB
ANION GAP SERPL CALC-SCNC: 18 MMOL/L — HIGH (ref 5–17)
BUN SERPL-MCNC: 51 MG/DL — HIGH (ref 7–23)
CALCIUM SERPL-MCNC: 9.6 MG/DL — SIGNIFICANT CHANGE UP (ref 8.4–10.5)
CHLORIDE SERPL-SCNC: 109 MMOL/L — HIGH (ref 96–108)
CO2 SERPL-SCNC: 17 MMOL/L — LOW (ref 22–31)
CREAT SERPL-MCNC: 1.41 MG/DL — HIGH (ref 0.5–1.3)
EGFR: 34 ML/MIN/1.73M2 — LOW
EGFR: 34 ML/MIN/1.73M2 — LOW
GLUCOSE SERPL-MCNC: 95 MG/DL — SIGNIFICANT CHANGE UP (ref 70–99)
HCT VFR BLD CALC: 30.8 % — LOW (ref 34.5–45)
HGB BLD-MCNC: 9.8 G/DL — LOW (ref 11.5–15.5)
MCHC RBC-ENTMCNC: 31.5 PG — SIGNIFICANT CHANGE UP (ref 27–34)
MCHC RBC-ENTMCNC: 31.8 G/DL — LOW (ref 32–36)
MCV RBC AUTO: 99 FL — SIGNIFICANT CHANGE UP (ref 80–100)
NRBC # BLD AUTO: 0 K/UL — SIGNIFICANT CHANGE UP (ref 0–0)
NRBC # FLD: 0 K/UL — SIGNIFICANT CHANGE UP (ref 0–0)
NRBC BLD AUTO-RTO: 0 /100 WBCS — SIGNIFICANT CHANGE UP (ref 0–0)
PLATELET # BLD AUTO: 141 K/UL — LOW (ref 150–400)
PMV BLD: 12.3 FL — SIGNIFICANT CHANGE UP (ref 7–13)
POTASSIUM SERPL-MCNC: 4.6 MMOL/L — SIGNIFICANT CHANGE UP (ref 3.5–5.3)
POTASSIUM SERPL-SCNC: 4.6 MMOL/L — SIGNIFICANT CHANGE UP (ref 3.5–5.3)
RBC # BLD: 3.11 M/UL — LOW (ref 3.8–5.2)
RBC # FLD: 15.1 % — HIGH (ref 10.3–14.5)
SODIUM SERPL-SCNC: 144 MMOL/L — SIGNIFICANT CHANGE UP (ref 135–145)
WBC # BLD: 6.93 K/UL — SIGNIFICANT CHANGE UP (ref 3.8–10.5)
WBC # FLD AUTO: 6.93 K/UL — SIGNIFICANT CHANGE UP (ref 3.8–10.5)

## 2025-08-08 PROCEDURE — 99233 SBSQ HOSP IP/OBS HIGH 50: CPT

## 2025-08-08 PROCEDURE — 88342 IMHCHEM/IMCYTCHM 1ST ANTB: CPT | Mod: 26

## 2025-08-08 PROCEDURE — 88341 IMHCHEM/IMCYTCHM EA ADD ANTB: CPT | Mod: 26

## 2025-08-08 PROCEDURE — 88305 TISSUE EXAM BY PATHOLOGIST: CPT | Mod: 26

## 2025-08-08 DEVICE — NET RETRV ROT ROTH 2.5MMX230CM: Type: IMPLANTABLE DEVICE | Status: FUNCTIONAL

## 2025-08-08 RX ADMIN — LOSARTAN POTASSIUM 100 MILLIGRAM(S): 100 TABLET, FILM COATED ORAL at 05:11

## 2025-08-08 RX ADMIN — Medication 100 MICROGRAM(S): at 05:11

## 2025-08-08 RX ADMIN — Medication 40 MILLIGRAM(S): at 05:12

## 2025-08-09 ENCOUNTER — TRANSCRIPTION ENCOUNTER (OUTPATIENT)
Age: 89
End: 2025-08-09

## 2025-08-09 VITALS
RESPIRATION RATE: 18 BRPM | DIASTOLIC BLOOD PRESSURE: 70 MMHG | OXYGEN SATURATION: 97 % | SYSTOLIC BLOOD PRESSURE: 148 MMHG | TEMPERATURE: 98 F | HEART RATE: 70 BPM

## 2025-08-09 LAB
ANION GAP SERPL CALC-SCNC: 18 MMOL/L — HIGH (ref 5–17)
BUN SERPL-MCNC: 50 MG/DL — HIGH (ref 7–23)
CALCIUM SERPL-MCNC: 8.8 MG/DL — SIGNIFICANT CHANGE UP (ref 8.4–10.5)
CHLORIDE SERPL-SCNC: 110 MMOL/L — HIGH (ref 96–108)
CO2 SERPL-SCNC: 14 MMOL/L — LOW (ref 22–31)
CREAT SERPL-MCNC: 1.68 MG/DL — HIGH (ref 0.5–1.3)
EGFR: 28 ML/MIN/1.73M2 — LOW
EGFR: 28 ML/MIN/1.73M2 — LOW
GLUCOSE SERPL-MCNC: 83 MG/DL — SIGNIFICANT CHANGE UP (ref 70–99)
HCT VFR BLD CALC: 29.2 % — LOW (ref 34.5–45)
HGB BLD-MCNC: 9.1 G/DL — LOW (ref 11.5–15.5)
MCHC RBC-ENTMCNC: 30.7 PG — SIGNIFICANT CHANGE UP (ref 27–34)
MCHC RBC-ENTMCNC: 31.2 G/DL — LOW (ref 32–36)
MCV RBC AUTO: 98.6 FL — SIGNIFICANT CHANGE UP (ref 80–100)
NRBC # BLD AUTO: 0 K/UL — SIGNIFICANT CHANGE UP (ref 0–0)
NRBC # FLD: 0 K/UL — SIGNIFICANT CHANGE UP (ref 0–0)
NRBC BLD AUTO-RTO: 0 /100 WBCS — SIGNIFICANT CHANGE UP (ref 0–0)
PLATELET # BLD AUTO: 122 K/UL — LOW (ref 150–400)
PMV BLD: 12.3 FL — SIGNIFICANT CHANGE UP (ref 7–13)
POTASSIUM SERPL-MCNC: 4.8 MMOL/L — SIGNIFICANT CHANGE UP (ref 3.5–5.3)
POTASSIUM SERPL-SCNC: 4.8 MMOL/L — SIGNIFICANT CHANGE UP (ref 3.5–5.3)
RBC # BLD: 2.96 M/UL — LOW (ref 3.8–5.2)
RBC # FLD: 15.1 % — HIGH (ref 10.3–14.5)
SODIUM SERPL-SCNC: 142 MMOL/L — SIGNIFICANT CHANGE UP (ref 135–145)
WBC # BLD: 8.09 K/UL — SIGNIFICANT CHANGE UP (ref 3.8–10.5)
WBC # FLD AUTO: 8.09 K/UL — SIGNIFICANT CHANGE UP (ref 3.8–10.5)

## 2025-08-09 PROCEDURE — 99285 EMERGENCY DEPT VISIT HI MDM: CPT

## 2025-08-09 PROCEDURE — 36415 COLL VENOUS BLD VENIPUNCTURE: CPT

## 2025-08-09 PROCEDURE — 71250 CT THORAX DX C-: CPT | Mod: 26

## 2025-08-09 PROCEDURE — 74177 CT ABD & PELVIS W/CONTRAST: CPT

## 2025-08-09 PROCEDURE — 83605 ASSAY OF LACTIC ACID: CPT

## 2025-08-09 PROCEDURE — 74176 CT ABD & PELVIS W/O CONTRAST: CPT

## 2025-08-09 PROCEDURE — 71046 X-RAY EXAM CHEST 2 VIEWS: CPT

## 2025-08-09 PROCEDURE — 84100 ASSAY OF PHOSPHORUS: CPT

## 2025-08-09 PROCEDURE — 86901 BLOOD TYPING SEROLOGIC RH(D): CPT

## 2025-08-09 PROCEDURE — 80053 COMPREHEN METABOLIC PANEL: CPT

## 2025-08-09 PROCEDURE — 88342 IMHCHEM/IMCYTCHM 1ST ANTB: CPT

## 2025-08-09 PROCEDURE — 88341 IMHCHEM/IMCYTCHM EA ADD ANTB: CPT

## 2025-08-09 PROCEDURE — 71250 CT THORAX DX C-: CPT

## 2025-08-09 PROCEDURE — 85025 COMPLETE CBC W/AUTO DIFF WBC: CPT

## 2025-08-09 PROCEDURE — 93306 TTE W/DOPPLER COMPLETE: CPT

## 2025-08-09 PROCEDURE — 83690 ASSAY OF LIPASE: CPT

## 2025-08-09 PROCEDURE — 80048 BASIC METABOLIC PNL TOTAL CA: CPT

## 2025-08-09 PROCEDURE — 93005 ELECTROCARDIOGRAM TRACING: CPT

## 2025-08-09 PROCEDURE — 86900 BLOOD TYPING SEROLOGIC ABO: CPT

## 2025-08-09 PROCEDURE — 93356 MYOCRD STRAIN IMG SPCKL TRCK: CPT

## 2025-08-09 PROCEDURE — 83735 ASSAY OF MAGNESIUM: CPT

## 2025-08-09 PROCEDURE — 81003 URINALYSIS AUTO W/O SCOPE: CPT

## 2025-08-09 PROCEDURE — 82378 CARCINOEMBRYONIC ANTIGEN: CPT

## 2025-08-09 PROCEDURE — 88305 TISSUE EXAM BY PATHOLOGIST: CPT

## 2025-08-09 PROCEDURE — 96375 TX/PRO/DX INJ NEW DRUG ADDON: CPT

## 2025-08-09 PROCEDURE — 85027 COMPLETE CBC AUTOMATED: CPT

## 2025-08-09 PROCEDURE — 86850 RBC ANTIBODY SCREEN: CPT

## 2025-08-09 PROCEDURE — 97161 PT EVAL LOW COMPLEX 20 MIN: CPT

## 2025-08-09 PROCEDURE — 74018 RADEX ABDOMEN 1 VIEW: CPT

## 2025-08-09 PROCEDURE — 99239 HOSP IP/OBS DSCHRG MGMT >30: CPT

## 2025-08-09 PROCEDURE — 96374 THER/PROPH/DIAG INJ IV PUSH: CPT

## 2025-08-09 RX ORDER — FUROSEMIDE 10 MG/ML
1 INJECTION INTRAMUSCULAR; INTRAVENOUS
Refills: 0 | DISCHARGE

## 2025-08-09 RX ORDER — APIXABAN 2.5 MG/1
1 TABLET, FILM COATED ORAL
Qty: 0 | Refills: 0 | DISCHARGE

## 2025-08-09 RX ADMIN — LOSARTAN POTASSIUM 100 MILLIGRAM(S): 100 TABLET, FILM COATED ORAL at 05:17

## 2025-08-09 RX ADMIN — Medication 100 MICROGRAM(S): at 05:17

## 2025-08-09 RX ADMIN — Medication 40 MILLIGRAM(S): at 05:17

## 2025-08-20 ENCOUNTER — APPOINTMENT (OUTPATIENT)
Dept: COLORECTAL SURGERY | Facility: CLINIC | Age: 89
End: 2025-08-20
Payer: MEDICARE

## 2025-08-20 VITALS
OXYGEN SATURATION: 95 % | HEART RATE: 84 BPM | DIASTOLIC BLOOD PRESSURE: 57 MMHG | SYSTOLIC BLOOD PRESSURE: 122 MMHG | TEMPERATURE: 97.7 F

## 2025-08-20 PROCEDURE — 99204 OFFICE O/P NEW MOD 45 MIN: CPT

## 2025-08-20 PROCEDURE — 99214 OFFICE O/P EST MOD 30 MIN: CPT

## 2025-08-20 RX ORDER — KRILL/OM-3/DHA/EPA/PHOSPHO/AST 1000-230MG
81 CAPSULE ORAL
Refills: 0 | Status: ACTIVE | COMMUNITY

## 2025-08-20 RX ORDER — FUROSEMIDE 20 MG/1
20 TABLET ORAL
Refills: 0 | Status: ACTIVE | COMMUNITY

## 2025-08-22 ENCOUNTER — APPOINTMENT (OUTPATIENT)
Dept: COLORECTAL SURGERY | Facility: CLINIC | Age: 89
End: 2025-08-22

## 2025-08-22 VITALS
HEIGHT: 61.25 IN | WEIGHT: 113 LBS | HEART RATE: 57 BPM | TEMPERATURE: 98 F | OXYGEN SATURATION: 100 % | DIASTOLIC BLOOD PRESSURE: 53 MMHG | SYSTOLIC BLOOD PRESSURE: 150 MMHG | BODY MASS INDEX: 21.06 KG/M2

## 2025-08-22 PROCEDURE — 99213 OFFICE O/P EST LOW 20 MIN: CPT

## 2025-09-01 ENCOUNTER — TRANSCRIPTION ENCOUNTER (OUTPATIENT)
Age: 89
End: 2025-09-01

## 2025-09-01 ENCOUNTER — INPATIENT (INPATIENT)
Facility: HOSPITAL | Age: 89
LOS: 4 days | Discharge: HOME | DRG: 395 | End: 2025-09-06
Attending: SURGERY | Admitting: SURGERY
Payer: MEDICARE

## 2025-09-01 VITALS
TEMPERATURE: 98 F | SYSTOLIC BLOOD PRESSURE: 162 MMHG | RESPIRATION RATE: 18 BRPM | HEART RATE: 78 BPM | OXYGEN SATURATION: 97 % | DIASTOLIC BLOOD PRESSURE: 54 MMHG

## 2025-09-01 DIAGNOSIS — K63.89 OTHER SPECIFIED DISEASES OF INTESTINE: ICD-10-CM

## 2025-09-01 DIAGNOSIS — Z98.49 CATARACT EXTRACTION STATUS, UNSPECIFIED EYE: Chronic | ICD-10-CM

## 2025-09-01 DIAGNOSIS — Z96.659 PRESENCE OF UNSPECIFIED ARTIFICIAL KNEE JOINT: Chronic | ICD-10-CM

## 2025-09-01 DIAGNOSIS — Z90.49 ACQUIRED ABSENCE OF OTHER SPECIFIED PARTS OF DIGESTIVE TRACT: Chronic | ICD-10-CM

## 2025-09-01 DIAGNOSIS — Z95.2 PRESENCE OF PROSTHETIC HEART VALVE: Chronic | ICD-10-CM

## 2025-09-01 LAB
ANION GAP SERPL CALC-SCNC: 15 MMOL/L — SIGNIFICANT CHANGE UP (ref 5–17)
BLD GP AB SCN SERPL QL: NEGATIVE — SIGNIFICANT CHANGE UP
BUN SERPL-MCNC: 73 MG/DL — HIGH (ref 7–23)
CALCIUM SERPL-MCNC: 9.7 MG/DL — SIGNIFICANT CHANGE UP (ref 8.4–10.5)
CHLORIDE SERPL-SCNC: 107 MMOL/L — SIGNIFICANT CHANGE UP (ref 96–108)
CO2 SERPL-SCNC: 19 MMOL/L — LOW (ref 22–31)
CREAT SERPL-MCNC: 1.24 MG/DL — SIGNIFICANT CHANGE UP (ref 0.5–1.3)
EGFR: 40 ML/MIN/1.73M2 — LOW
EGFR: 40 ML/MIN/1.73M2 — LOW
GLUCOSE SERPL-MCNC: 98 MG/DL — SIGNIFICANT CHANGE UP (ref 70–99)
HCT VFR BLD CALC: 28.9 % — LOW (ref 34.5–45)
HGB BLD-MCNC: 9.2 G/DL — LOW (ref 11.5–15.5)
MAGNESIUM SERPL-MCNC: 2.6 MG/DL — SIGNIFICANT CHANGE UP (ref 1.6–2.6)
MCHC RBC-ENTMCNC: 30.7 PG — SIGNIFICANT CHANGE UP (ref 27–34)
MCHC RBC-ENTMCNC: 31.8 G/DL — LOW (ref 32–36)
MCV RBC AUTO: 96.3 FL — SIGNIFICANT CHANGE UP (ref 80–100)
NRBC # BLD AUTO: 0 K/UL — SIGNIFICANT CHANGE UP (ref 0–0)
NRBC # FLD: 0 K/UL — SIGNIFICANT CHANGE UP (ref 0–0)
NRBC BLD AUTO-RTO: 0 /100 WBCS — SIGNIFICANT CHANGE UP (ref 0–0)
PHOSPHATE SERPL-MCNC: 4.6 MG/DL — HIGH (ref 2.5–4.5)
PLATELET # BLD AUTO: 158 K/UL — SIGNIFICANT CHANGE UP (ref 150–400)
PMV BLD: 11.4 FL — SIGNIFICANT CHANGE UP (ref 7–13)
POTASSIUM SERPL-MCNC: 5.5 MMOL/L — HIGH (ref 3.5–5.3)
POTASSIUM SERPL-SCNC: 5.5 MMOL/L — HIGH (ref 3.5–5.3)
RBC # BLD: 3 M/UL — LOW (ref 3.8–5.2)
RBC # FLD: 15.3 % — HIGH (ref 10.3–14.5)
RH IG SCN BLD-IMP: POSITIVE — SIGNIFICANT CHANGE UP
SODIUM SERPL-SCNC: 141 MMOL/L — SIGNIFICANT CHANGE UP (ref 135–145)
WBC # BLD: 8.07 K/UL — SIGNIFICANT CHANGE UP (ref 3.8–10.5)
WBC # FLD AUTO: 8.07 K/UL — SIGNIFICANT CHANGE UP (ref 3.8–10.5)

## 2025-09-01 PROCEDURE — 84100 ASSAY OF PHOSPHORUS: CPT

## 2025-09-01 PROCEDURE — 93010 ELECTROCARDIOGRAM REPORT: CPT

## 2025-09-01 PROCEDURE — 80048 BASIC METABOLIC PNL TOTAL CA: CPT

## 2025-09-01 PROCEDURE — 86901 BLOOD TYPING SEROLOGIC RH(D): CPT

## 2025-09-01 PROCEDURE — 86850 RBC ANTIBODY SCREEN: CPT

## 2025-09-01 PROCEDURE — 83735 ASSAY OF MAGNESIUM: CPT

## 2025-09-01 PROCEDURE — 86900 BLOOD TYPING SEROLOGIC ABO: CPT

## 2025-09-01 PROCEDURE — 85027 COMPLETE CBC AUTOMATED: CPT

## 2025-09-01 PROCEDURE — 36415 COLL VENOUS BLD VENIPUNCTURE: CPT

## 2025-09-01 RX ORDER — LEVOTHYROXINE SODIUM 300 MCG
100 TABLET ORAL DAILY
Refills: 0 | Status: DISCONTINUED | OUTPATIENT
Start: 2025-09-01 | End: 2025-09-02

## 2025-09-01 RX ORDER — INFLUENZA A VIRUS A/IDAHO/07/2018 (H1N1) ANTIGEN (MDCK CELL DERIVED, PROPIOLACTONE INACTIVATED, INFLUENZA A VIRUS A/INDIANA/08/2018 (H3N2) ANTIGEN (MDCK CELL DERIVED, PROPIOLACTONE INACTIVATED), INFLUENZA B VIRUS B/SINGAPORE/INFTT-16-0610/2016 ANTIGEN (MDCK CELL DERIVED, PROPIOLACTONE INACTIVATED), INFLUENZA B VIRUS B/IOWA/06/2017 ANTIGEN (MDCK CELL DERIVED, PROPIOLACTONE INACTIVATED) 15; 15; 15; 15 UG/.5ML; UG/.5ML; UG/.5ML; UG/.5ML
0.5 INJECTION, SUSPENSION INTRAMUSCULAR ONCE
Refills: 0 | Status: DISCONTINUED | OUTPATIENT
Start: 2025-09-01 | End: 2025-09-04

## 2025-09-01 RX ORDER — FUROSEMIDE 10 MG/ML
1 INJECTION INTRAMUSCULAR; INTRAVENOUS
Refills: 0 | DISCHARGE

## 2025-09-01 RX ORDER — IRBESARTAN 75 MG/1
1 TABLET ORAL
Refills: 0 | DISCHARGE

## 2025-09-01 RX ORDER — ASPIRIN 325 MG
1 TABLET ORAL
Refills: 0 | DISCHARGE

## 2025-09-01 RX ORDER — FERROUS SULFATE 137(45) MG
0 TABLET, EXTENDED RELEASE ORAL
Refills: 0 | DISCHARGE

## 2025-09-01 RX ORDER — POLYETHYLENE GLYCOL 3350 17 G/17G
17 POWDER, FOR SOLUTION ORAL
Refills: 0 | Status: DISCONTINUED | OUTPATIENT
Start: 2025-09-01 | End: 2025-09-01

## 2025-09-01 RX ORDER — FERROUS SULFATE 137(45) MG
45 TABLET, EXTENDED RELEASE ORAL
Refills: 0 | DISCHARGE

## 2025-09-01 RX ORDER — SODIUM CHLORIDE 9 G/1000ML
1000 INJECTION, SOLUTION INTRAVENOUS
Refills: 0 | Status: DISCONTINUED | OUTPATIENT
Start: 2025-09-01 | End: 2025-09-02

## 2025-09-01 RX ORDER — HEPARIN SODIUM 1000 [USP'U]/ML
5000 INJECTION INTRAVENOUS; SUBCUTANEOUS EVERY 8 HOURS
Refills: 0 | Status: COMPLETED | OUTPATIENT
Start: 2025-09-01 | End: 2025-09-02

## 2025-09-01 RX ORDER — METRONIDAZOLE 250 MG
500 TABLET ORAL
Refills: 0 | Status: COMPLETED | OUTPATIENT
Start: 2025-09-01 | End: 2025-09-01

## 2025-09-01 RX ORDER — POLYETHYLENE GLYCOL-3350 AND ELECTROLYTES 236; 6.74; 5.86; 2.97; 22.74 G/274.31G; G/274.31G; G/274.31G; G/274.31G; G/274.31G
1000 POWDER, FOR SOLUTION ORAL ONCE
Refills: 0 | Status: COMPLETED | OUTPATIENT
Start: 2025-09-01 | End: 2025-09-01

## 2025-09-01 RX ORDER — NEOMYCIN SULFATE 87.5 MG/5ML
500 SOLUTION ORAL
Refills: 0 | Status: COMPLETED | OUTPATIENT
Start: 2025-09-01 | End: 2025-09-01

## 2025-09-01 RX ORDER — B1/B2/B3/B5/B6/B12/VIT C/FOLIC 500-0.5 MG
1 TABLET ORAL
Refills: 0 | DISCHARGE

## 2025-09-01 RX ADMIN — NEOMYCIN SULFATE 500 MILLIGRAM(S): 87.5 SOLUTION ORAL at 15:02

## 2025-09-01 RX ADMIN — HEPARIN SODIUM 5000 UNIT(S): 1000 INJECTION INTRAVENOUS; SUBCUTANEOUS at 22:11

## 2025-09-01 RX ADMIN — Medication 500 MILLIGRAM(S): at 22:11

## 2025-09-01 RX ADMIN — POLYETHYLENE GLYCOL-3350 AND ELECTROLYTES 1000 MILLILITER(S): 236; 6.74; 5.86; 2.97; 22.74 POWDER, FOR SOLUTION ORAL at 17:25

## 2025-09-01 RX ADMIN — NEOMYCIN SULFATE 500 MILLIGRAM(S): 87.5 SOLUTION ORAL at 22:11

## 2025-09-01 RX ADMIN — Medication 500 MILLIGRAM(S): at 15:02

## 2025-09-02 ENCOUNTER — APPOINTMENT (OUTPATIENT)
Dept: COLORECTAL SURGERY | Facility: HOSPITAL | Age: 89
End: 2025-09-02
Payer: MEDICARE

## 2025-09-02 ENCOUNTER — RESULT REVIEW (OUTPATIENT)
Age: 89
End: 2025-09-02

## 2025-09-02 ENCOUNTER — TRANSCRIPTION ENCOUNTER (OUTPATIENT)
Age: 89
End: 2025-09-02

## 2025-09-02 LAB
ANION GAP SERPL CALC-SCNC: 13 MMOL/L — SIGNIFICANT CHANGE UP (ref 5–17)
APTT BLD: 26.2 SEC — SIGNIFICANT CHANGE UP (ref 26.1–36.8)
BLD GP AB SCN SERPL QL: NEGATIVE — SIGNIFICANT CHANGE UP
BUN SERPL-MCNC: 58 MG/DL — HIGH (ref 7–23)
CALCIUM SERPL-MCNC: 8.8 MG/DL — SIGNIFICANT CHANGE UP (ref 8.4–10.5)
CHLORIDE SERPL-SCNC: 115 MMOL/L — HIGH (ref 96–108)
CO2 SERPL-SCNC: 17 MMOL/L — LOW (ref 22–31)
CREAT SERPL-MCNC: 1.21 MG/DL — SIGNIFICANT CHANGE UP (ref 0.5–1.3)
EGFR: 41 ML/MIN/1.73M2 — LOW
EGFR: 41 ML/MIN/1.73M2 — LOW
GLUCOSE SERPL-MCNC: 90 MG/DL — SIGNIFICANT CHANGE UP (ref 70–99)
HCT VFR BLD CALC: 26.7 % — LOW (ref 34.5–45)
HGB BLD-MCNC: 8.5 G/DL — LOW (ref 11.5–15.5)
INR BLD: 0.89 RATIO — SIGNIFICANT CHANGE UP (ref 0.85–1.16)
MAGNESIUM SERPL-MCNC: 2.5 MG/DL — SIGNIFICANT CHANGE UP (ref 1.6–2.6)
MCHC RBC-ENTMCNC: 31.3 PG — SIGNIFICANT CHANGE UP (ref 27–34)
MCHC RBC-ENTMCNC: 31.8 G/DL — LOW (ref 32–36)
MCV RBC AUTO: 98.2 FL — SIGNIFICANT CHANGE UP (ref 80–100)
NRBC # BLD AUTO: 0 K/UL — SIGNIFICANT CHANGE UP (ref 0–0)
NRBC # FLD: 0 K/UL — SIGNIFICANT CHANGE UP (ref 0–0)
NRBC BLD AUTO-RTO: 0 /100 WBCS — SIGNIFICANT CHANGE UP (ref 0–0)
PHOSPHATE SERPL-MCNC: 3.6 MG/DL — SIGNIFICANT CHANGE UP (ref 2.5–4.5)
PLATELET # BLD AUTO: 148 K/UL — LOW (ref 150–400)
PMV BLD: 11.7 FL — SIGNIFICANT CHANGE UP (ref 7–13)
POTASSIUM SERPL-MCNC: 4.9 MMOL/L — SIGNIFICANT CHANGE UP (ref 3.5–5.3)
POTASSIUM SERPL-SCNC: 4.9 MMOL/L — SIGNIFICANT CHANGE UP (ref 3.5–5.3)
PROTHROM AB SERPL-ACNC: 10.3 SEC — SIGNIFICANT CHANGE UP (ref 9.9–13.4)
RBC # BLD: 2.72 M/UL — LOW (ref 3.8–5.2)
RBC # FLD: 15.1 % — HIGH (ref 10.3–14.5)
RH IG SCN BLD-IMP: POSITIVE — SIGNIFICANT CHANGE UP
SODIUM SERPL-SCNC: 145 MMOL/L — SIGNIFICANT CHANGE UP (ref 135–145)
WBC # BLD: 6.36 K/UL — SIGNIFICANT CHANGE UP (ref 3.8–10.5)
WBC # FLD AUTO: 6.36 K/UL — SIGNIFICANT CHANGE UP (ref 3.8–10.5)

## 2025-09-02 PROCEDURE — 88341 IMHCHEM/IMCYTCHM EA ADD ANTB: CPT | Mod: 26,59

## 2025-09-02 PROCEDURE — 86900 BLOOD TYPING SEROLOGIC ABO: CPT

## 2025-09-02 PROCEDURE — 88309 TISSUE EXAM BY PATHOLOGIST: CPT | Mod: 26

## 2025-09-02 PROCEDURE — 80048 BASIC METABOLIC PNL TOTAL CA: CPT

## 2025-09-02 PROCEDURE — 36415 COLL VENOUS BLD VENIPUNCTURE: CPT

## 2025-09-02 PROCEDURE — 86850 RBC ANTIBODY SCREEN: CPT

## 2025-09-02 PROCEDURE — 84100 ASSAY OF PHOSPHORUS: CPT

## 2025-09-02 PROCEDURE — 85027 COMPLETE CBC AUTOMATED: CPT

## 2025-09-02 PROCEDURE — 85730 THROMBOPLASTIN TIME PARTIAL: CPT

## 2025-09-02 PROCEDURE — 86901 BLOOD TYPING SEROLOGIC RH(D): CPT

## 2025-09-02 PROCEDURE — 44204 LAPARO PARTIAL COLECTOMY: CPT

## 2025-09-02 PROCEDURE — 93005 ELECTROCARDIOGRAM TRACING: CPT

## 2025-09-02 PROCEDURE — 88342 IMHCHEM/IMCYTCHM 1ST ANTB: CPT | Mod: 26,59

## 2025-09-02 PROCEDURE — 85610 PROTHROMBIN TIME: CPT

## 2025-09-02 PROCEDURE — 83735 ASSAY OF MAGNESIUM: CPT

## 2025-09-02 DEVICE — STAPLER COVIDIEN TA 90 BLUE: Type: IMPLANTABLE DEVICE | Status: FUNCTIONAL

## 2025-09-02 DEVICE — STAPLER ETHICON PROXIMATE 75MM WITH BLUE RELOAD: Type: IMPLANTABLE DEVICE | Status: FUNCTIONAL

## 2025-09-02 DEVICE — CLIP APPLIER COVIDIEN ENDOCLIP III 5MM: Type: IMPLANTABLE DEVICE | Status: FUNCTIONAL

## 2025-09-02 RX ORDER — NALOXONE HYDROCHLORIDE 0.4 MG/ML
0.1 INJECTION, SOLUTION INTRAMUSCULAR; INTRAVENOUS; SUBCUTANEOUS
Refills: 0 | Status: DISCONTINUED | OUTPATIENT
Start: 2025-09-02 | End: 2025-09-03

## 2025-09-02 RX ORDER — HYDROMORPHONE/SOD CHLOR,ISO/PF 2 MG/10 ML
0.25 SYRINGE (ML) INJECTION
Refills: 0 | Status: DISCONTINUED | OUTPATIENT
Start: 2025-09-02 | End: 2025-09-03

## 2025-09-02 RX ORDER — OXYCODONE HYDROCHLORIDE 30 MG/1
5 TABLET ORAL
Refills: 0 | Status: DISCONTINUED | OUTPATIENT
Start: 2025-09-02 | End: 2025-09-03

## 2025-09-02 RX ORDER — HEPARIN SODIUM 1000 [USP'U]/ML
5000 INJECTION INTRAVENOUS; SUBCUTANEOUS EVERY 8 HOURS
Refills: 0 | Status: DISCONTINUED | OUTPATIENT
Start: 2025-09-02 | End: 2025-09-06

## 2025-09-02 RX ORDER — ONDANSETRON HCL/PF 4 MG/2 ML
4 VIAL (ML) INJECTION EVERY 6 HOURS
Refills: 0 | Status: DISCONTINUED | OUTPATIENT
Start: 2025-09-02 | End: 2025-09-03

## 2025-09-02 RX ORDER — NALBUPHINE HYDROCHLORIDE 10 MG/ML
2.5 INJECTION INTRAMUSCULAR; INTRAVENOUS; SUBCUTANEOUS EVERY 6 HOURS
Refills: 0 | Status: DISCONTINUED | OUTPATIENT
Start: 2025-09-02 | End: 2025-09-03

## 2025-09-02 RX ORDER — ACETAMINOPHEN 500 MG/5ML
750 LIQUID (ML) ORAL EVERY 6 HOURS
Refills: 0 | Status: COMPLETED | OUTPATIENT
Start: 2025-09-02 | End: 2025-09-03

## 2025-09-02 RX ORDER — SODIUM CHLORIDE 9 G/1000ML
1000 INJECTION, SOLUTION INTRAVENOUS
Refills: 0 | Status: DISCONTINUED | OUTPATIENT
Start: 2025-09-02 | End: 2025-09-05

## 2025-09-02 RX ORDER — LEVOTHYROXINE SODIUM 300 MCG
100 TABLET ORAL DAILY
Refills: 0 | Status: DISCONTINUED | OUTPATIENT
Start: 2025-09-02 | End: 2025-09-06

## 2025-09-02 RX ADMIN — SODIUM CHLORIDE 40 MILLILITER(S): 9 INJECTION, SOLUTION INTRAVENOUS at 21:19

## 2025-09-02 RX ADMIN — Medication 300 MILLIGRAM(S): at 17:51

## 2025-09-02 RX ADMIN — Medication 300 MILLIGRAM(S): at 23:46

## 2025-09-02 RX ADMIN — SODIUM CHLORIDE 40 MILLILITER(S): 9 INJECTION, SOLUTION INTRAVENOUS at 17:51

## 2025-09-02 RX ADMIN — HEPARIN SODIUM 5000 UNIT(S): 1000 INJECTION INTRAVENOUS; SUBCUTANEOUS at 21:17

## 2025-09-03 LAB
ANION GAP SERPL CALC-SCNC: 15 MMOL/L — SIGNIFICANT CHANGE UP (ref 5–17)
BUN SERPL-MCNC: 42 MG/DL — HIGH (ref 7–23)
CALCIUM SERPL-MCNC: 7.6 MG/DL — LOW (ref 8.4–10.5)
CHLORIDE SERPL-SCNC: 107 MMOL/L — SIGNIFICANT CHANGE UP (ref 96–108)
CO2 SERPL-SCNC: 17 MMOL/L — LOW (ref 22–31)
CREAT SERPL-MCNC: 1.18 MG/DL — SIGNIFICANT CHANGE UP (ref 0.5–1.3)
EGFR: 43 ML/MIN/1.73M2 — LOW
EGFR: 43 ML/MIN/1.73M2 — LOW
GLUCOSE SERPL-MCNC: 108 MG/DL — HIGH (ref 70–99)
HCT VFR BLD CALC: 27.5 % — LOW (ref 34.5–45)
HGB BLD-MCNC: 8.9 G/DL — LOW (ref 11.5–15.5)
MAGNESIUM SERPL-MCNC: 2.3 MG/DL — SIGNIFICANT CHANGE UP (ref 1.6–2.6)
MCHC RBC-ENTMCNC: 31.7 PG — SIGNIFICANT CHANGE UP (ref 27–34)
MCHC RBC-ENTMCNC: 32.4 G/DL — SIGNIFICANT CHANGE UP (ref 32–36)
MCV RBC AUTO: 97.9 FL — SIGNIFICANT CHANGE UP (ref 80–100)
NRBC # BLD AUTO: 0 K/UL — SIGNIFICANT CHANGE UP (ref 0–0)
NRBC # FLD: 0 K/UL — SIGNIFICANT CHANGE UP (ref 0–0)
NRBC BLD AUTO-RTO: 0 /100 WBCS — SIGNIFICANT CHANGE UP (ref 0–0)
PHOSPHATE SERPL-MCNC: 4.1 MG/DL — SIGNIFICANT CHANGE UP (ref 2.5–4.5)
PLATELET # BLD AUTO: 129 K/UL — LOW (ref 150–400)
PMV BLD: 11.9 FL — SIGNIFICANT CHANGE UP (ref 7–13)
POTASSIUM SERPL-MCNC: 4.9 MMOL/L — SIGNIFICANT CHANGE UP (ref 3.5–5.3)
POTASSIUM SERPL-SCNC: 4.9 MMOL/L — SIGNIFICANT CHANGE UP (ref 3.5–5.3)
RBC # BLD: 2.81 M/UL — LOW (ref 3.8–5.2)
RBC # FLD: 15.4 % — HIGH (ref 10.3–14.5)
SODIUM SERPL-SCNC: 139 MMOL/L — SIGNIFICANT CHANGE UP (ref 135–145)
WBC # BLD: 15.72 K/UL — HIGH (ref 3.8–10.5)
WBC # FLD AUTO: 15.72 K/UL — HIGH (ref 3.8–10.5)

## 2025-09-03 RX ORDER — ACETAMINOPHEN 500 MG/5ML
650 LIQUID (ML) ORAL EVERY 6 HOURS
Refills: 0 | Status: DISCONTINUED | OUTPATIENT
Start: 2025-09-03 | End: 2025-09-06

## 2025-09-03 RX ADMIN — Medication 650 MILLIGRAM(S): at 23:00

## 2025-09-03 RX ADMIN — Medication 300 MILLIGRAM(S): at 11:17

## 2025-09-03 RX ADMIN — Medication 300 MILLIGRAM(S): at 05:36

## 2025-09-03 RX ADMIN — SODIUM CHLORIDE 40 MILLILITER(S): 9 INJECTION, SOLUTION INTRAVENOUS at 22:08

## 2025-09-03 RX ADMIN — Medication 650 MILLIGRAM(S): at 22:14

## 2025-09-03 RX ADMIN — Medication 100 MICROGRAM(S): at 05:36

## 2025-09-03 RX ADMIN — HEPARIN SODIUM 5000 UNIT(S): 1000 INJECTION INTRAVENOUS; SUBCUTANEOUS at 05:35

## 2025-09-03 RX ADMIN — Medication 650 MILLIGRAM(S): at 17:37

## 2025-09-03 RX ADMIN — Medication 750 MILLIGRAM(S): at 00:30

## 2025-09-03 RX ADMIN — Medication 650 MILLIGRAM(S): at 18:07

## 2025-09-03 RX ADMIN — Medication 750 MILLIGRAM(S): at 11:47

## 2025-09-03 RX ADMIN — HEPARIN SODIUM 5000 UNIT(S): 1000 INJECTION INTRAVENOUS; SUBCUTANEOUS at 20:08

## 2025-09-03 RX ADMIN — HEPARIN SODIUM 5000 UNIT(S): 1000 INJECTION INTRAVENOUS; SUBCUTANEOUS at 11:18

## 2025-09-03 RX ADMIN — Medication 750 MILLIGRAM(S): at 05:58

## 2025-09-04 LAB
ANION GAP SERPL CALC-SCNC: 13 MMOL/L — SIGNIFICANT CHANGE UP (ref 5–17)
BUN SERPL-MCNC: 26 MG/DL — HIGH (ref 7–23)
CALCIUM SERPL-MCNC: 7.8 MG/DL — LOW (ref 8.4–10.5)
CHLORIDE SERPL-SCNC: 109 MMOL/L — HIGH (ref 96–108)
CO2 SERPL-SCNC: 18 MMOL/L — LOW (ref 22–31)
CREAT SERPL-MCNC: 1.19 MG/DL — SIGNIFICANT CHANGE UP (ref 0.5–1.3)
EGFR: 42 ML/MIN/1.73M2 — LOW
EGFR: 42 ML/MIN/1.73M2 — LOW
GLUCOSE SERPL-MCNC: 93 MG/DL — SIGNIFICANT CHANGE UP (ref 70–99)
HCT VFR BLD CALC: 28 % — LOW (ref 34.5–45)
HGB BLD-MCNC: 9.2 G/DL — LOW (ref 11.5–15.5)
MAGNESIUM SERPL-MCNC: 2.4 MG/DL — SIGNIFICANT CHANGE UP (ref 1.6–2.6)
MCHC RBC-ENTMCNC: 31.9 PG — SIGNIFICANT CHANGE UP (ref 27–34)
MCHC RBC-ENTMCNC: 32.9 G/DL — SIGNIFICANT CHANGE UP (ref 32–36)
MCV RBC AUTO: 97.2 FL — SIGNIFICANT CHANGE UP (ref 80–100)
NRBC # BLD AUTO: 0 K/UL — SIGNIFICANT CHANGE UP (ref 0–0)
NRBC # FLD: 0 K/UL — SIGNIFICANT CHANGE UP (ref 0–0)
NRBC BLD AUTO-RTO: 0 /100 WBCS — SIGNIFICANT CHANGE UP (ref 0–0)
PHOSPHATE SERPL-MCNC: 2.2 MG/DL — LOW (ref 2.5–4.5)
PLATELET # BLD AUTO: 127 K/UL — LOW (ref 150–400)
PMV BLD: 12.6 FL — SIGNIFICANT CHANGE UP (ref 7–13)
POTASSIUM SERPL-MCNC: 4.3 MMOL/L — SIGNIFICANT CHANGE UP (ref 3.5–5.3)
POTASSIUM SERPL-SCNC: 4.3 MMOL/L — SIGNIFICANT CHANGE UP (ref 3.5–5.3)
RBC # BLD: 2.88 M/UL — LOW (ref 3.8–5.2)
RBC # FLD: 15.3 % — HIGH (ref 10.3–14.5)
SODIUM SERPL-SCNC: 140 MMOL/L — SIGNIFICANT CHANGE UP (ref 135–145)
WBC # BLD: 10.06 K/UL — SIGNIFICANT CHANGE UP (ref 3.8–10.5)
WBC # FLD AUTO: 10.06 K/UL — SIGNIFICANT CHANGE UP (ref 3.8–10.5)

## 2025-09-04 PROCEDURE — 74018 RADEX ABDOMEN 1 VIEW: CPT | Mod: 26

## 2025-09-04 RX ORDER — METHOCARBAMOL 500 MG/1
500 TABLET, FILM COATED ORAL EVERY 6 HOURS
Refills: 0 | Status: DISCONTINUED | OUTPATIENT
Start: 2025-09-04 | End: 2025-09-06

## 2025-09-04 RX ORDER — TRAMADOL HYDROCHLORIDE 50 MG/1
25 TABLET, FILM COATED ORAL EVERY 6 HOURS
Refills: 0 | Status: DISCONTINUED | OUTPATIENT
Start: 2025-09-04 | End: 2025-09-06

## 2025-09-04 RX ORDER — SOD PHOS DI, MONO/K PHOS MONO 250 MG
1 TABLET ORAL ONCE
Refills: 0 | Status: COMPLETED | OUTPATIENT
Start: 2025-09-04 | End: 2025-09-04

## 2025-09-04 RX ADMIN — METHOCARBAMOL 500 MILLIGRAM(S): 500 TABLET, FILM COATED ORAL at 23:56

## 2025-09-04 RX ADMIN — SODIUM CHLORIDE 40 MILLILITER(S): 9 INJECTION, SOLUTION INTRAVENOUS at 15:21

## 2025-09-04 RX ADMIN — METHOCARBAMOL 500 MILLIGRAM(S): 500 TABLET, FILM COATED ORAL at 11:42

## 2025-09-04 RX ADMIN — Medication 650 MILLIGRAM(S): at 05:45

## 2025-09-04 RX ADMIN — HEPARIN SODIUM 5000 UNIT(S): 1000 INJECTION INTRAVENOUS; SUBCUTANEOUS at 11:41

## 2025-09-04 RX ADMIN — METHOCARBAMOL 500 MILLIGRAM(S): 500 TABLET, FILM COATED ORAL at 17:39

## 2025-09-04 RX ADMIN — Medication 100 MICROGRAM(S): at 05:01

## 2025-09-04 RX ADMIN — HEPARIN SODIUM 5000 UNIT(S): 1000 INJECTION INTRAVENOUS; SUBCUTANEOUS at 19:51

## 2025-09-04 RX ADMIN — Medication 1 PACKET(S): at 19:50

## 2025-09-04 RX ADMIN — Medication 650 MILLIGRAM(S): at 23:56

## 2025-09-04 RX ADMIN — HEPARIN SODIUM 5000 UNIT(S): 1000 INJECTION INTRAVENOUS; SUBCUTANEOUS at 05:00

## 2025-09-04 RX ADMIN — Medication 650 MILLIGRAM(S): at 18:09

## 2025-09-04 RX ADMIN — Medication 650 MILLIGRAM(S): at 05:00

## 2025-09-04 RX ADMIN — Medication 650 MILLIGRAM(S): at 17:39

## 2025-09-04 RX ADMIN — Medication 650 MILLIGRAM(S): at 12:11

## 2025-09-04 RX ADMIN — Medication 650 MILLIGRAM(S): at 11:41

## 2025-09-05 ENCOUNTER — TRANSCRIPTION ENCOUNTER (OUTPATIENT)
Age: 89
End: 2025-09-05

## 2025-09-05 LAB
ANION GAP SERPL CALC-SCNC: 12 MMOL/L — SIGNIFICANT CHANGE UP (ref 5–17)
BUN SERPL-MCNC: 31 MG/DL — HIGH (ref 7–23)
CALCIUM SERPL-MCNC: 8.4 MG/DL — SIGNIFICANT CHANGE UP (ref 8.4–10.5)
CHLORIDE SERPL-SCNC: 108 MMOL/L — SIGNIFICANT CHANGE UP (ref 96–108)
CO2 SERPL-SCNC: 19 MMOL/L — LOW (ref 22–31)
CREAT SERPL-MCNC: 1.17 MG/DL — SIGNIFICANT CHANGE UP (ref 0.5–1.3)
EGFR: 43 ML/MIN/1.73M2 — LOW
EGFR: 43 ML/MIN/1.73M2 — LOW
GLUCOSE SERPL-MCNC: 92 MG/DL — SIGNIFICANT CHANGE UP (ref 70–99)
HCT VFR BLD CALC: 27.7 % — LOW (ref 34.5–45)
HGB BLD-MCNC: 9.1 G/DL — LOW (ref 11.5–15.5)
MAGNESIUM SERPL-MCNC: 2.4 MG/DL — SIGNIFICANT CHANGE UP (ref 1.6–2.6)
MCHC RBC-ENTMCNC: 31.6 PG — SIGNIFICANT CHANGE UP (ref 27–34)
MCHC RBC-ENTMCNC: 32.9 G/DL — SIGNIFICANT CHANGE UP (ref 32–36)
MCV RBC AUTO: 96.2 FL — SIGNIFICANT CHANGE UP (ref 80–100)
NRBC # BLD AUTO: 0 K/UL — SIGNIFICANT CHANGE UP (ref 0–0)
NRBC # FLD: 0 K/UL — SIGNIFICANT CHANGE UP (ref 0–0)
NRBC BLD AUTO-RTO: 0 /100 WBCS — SIGNIFICANT CHANGE UP (ref 0–0)
PHOSPHATE SERPL-MCNC: 2.2 MG/DL — LOW (ref 2.5–4.5)
PLATELET # BLD AUTO: 132 K/UL — LOW (ref 150–400)
PMV BLD: 12.7 FL — SIGNIFICANT CHANGE UP (ref 7–13)
POTASSIUM SERPL-MCNC: 4.4 MMOL/L — SIGNIFICANT CHANGE UP (ref 3.5–5.3)
POTASSIUM SERPL-SCNC: 4.4 MMOL/L — SIGNIFICANT CHANGE UP (ref 3.5–5.3)
RBC # BLD: 2.88 M/UL — LOW (ref 3.8–5.2)
RBC # FLD: 15.4 % — HIGH (ref 10.3–14.5)
SODIUM SERPL-SCNC: 139 MMOL/L — SIGNIFICANT CHANGE UP (ref 135–145)
WBC # BLD: 9.87 K/UL — SIGNIFICANT CHANGE UP (ref 3.8–10.5)
WBC # FLD AUTO: 9.87 K/UL — SIGNIFICANT CHANGE UP (ref 3.8–10.5)

## 2025-09-05 RX ORDER — LOSARTAN POTASSIUM 100 MG/1
100 TABLET, FILM COATED ORAL DAILY
Refills: 0 | Status: DISCONTINUED | OUTPATIENT
Start: 2025-09-05 | End: 2025-09-06

## 2025-09-05 RX ORDER — SOD PHOS DI, MONO/K PHOS MONO 250 MG
1 TABLET ORAL ONCE
Refills: 0 | Status: COMPLETED | OUTPATIENT
Start: 2025-09-05 | End: 2025-09-05

## 2025-09-05 RX ORDER — TRAMADOL HYDROCHLORIDE 50 MG/1
0.5 TABLET, FILM COATED ORAL
Qty: 8 | Refills: 0
Start: 2025-09-05 | End: 2025-09-10

## 2025-09-05 RX ORDER — ACETAMINOPHEN 500 MG/5ML
2 LIQUID (ML) ORAL
Qty: 0 | Refills: 0 | DISCHARGE
Start: 2025-09-05

## 2025-09-05 RX ADMIN — Medication 650 MILLIGRAM(S): at 05:48

## 2025-09-05 RX ADMIN — Medication 650 MILLIGRAM(S): at 18:15

## 2025-09-05 RX ADMIN — METHOCARBAMOL 500 MILLIGRAM(S): 500 TABLET, FILM COATED ORAL at 11:44

## 2025-09-05 RX ADMIN — HEPARIN SODIUM 5000 UNIT(S): 1000 INJECTION INTRAVENOUS; SUBCUTANEOUS at 04:57

## 2025-09-05 RX ADMIN — Medication 650 MILLIGRAM(S): at 17:39

## 2025-09-05 RX ADMIN — Medication 650 MILLIGRAM(S): at 11:44

## 2025-09-05 RX ADMIN — Medication 650 MILLIGRAM(S): at 12:30

## 2025-09-05 RX ADMIN — Medication 650 MILLIGRAM(S): at 23:43

## 2025-09-05 RX ADMIN — Medication 650 MILLIGRAM(S): at 05:18

## 2025-09-05 RX ADMIN — METHOCARBAMOL 500 MILLIGRAM(S): 500 TABLET, FILM COATED ORAL at 05:18

## 2025-09-05 RX ADMIN — Medication 100 MICROGRAM(S): at 05:19

## 2025-09-05 RX ADMIN — METHOCARBAMOL 500 MILLIGRAM(S): 500 TABLET, FILM COATED ORAL at 17:47

## 2025-09-05 RX ADMIN — HEPARIN SODIUM 5000 UNIT(S): 1000 INJECTION INTRAVENOUS; SUBCUTANEOUS at 21:21

## 2025-09-05 RX ADMIN — LOSARTAN POTASSIUM 100 MILLIGRAM(S): 100 TABLET, FILM COATED ORAL at 06:17

## 2025-09-05 RX ADMIN — Medication 650 MILLIGRAM(S): at 00:26

## 2025-09-05 RX ADMIN — METHOCARBAMOL 500 MILLIGRAM(S): 500 TABLET, FILM COATED ORAL at 23:15

## 2025-09-05 RX ADMIN — HEPARIN SODIUM 5000 UNIT(S): 1000 INJECTION INTRAVENOUS; SUBCUTANEOUS at 11:44

## 2025-09-05 RX ADMIN — Medication 650 MILLIGRAM(S): at 23:13

## 2025-09-05 RX ADMIN — Medication 1 TABLET(S): at 11:44

## 2025-09-06 ENCOUNTER — TRANSCRIPTION ENCOUNTER (OUTPATIENT)
Age: 89
End: 2025-09-06

## 2025-09-06 VITALS
DIASTOLIC BLOOD PRESSURE: 72 MMHG | HEART RATE: 66 BPM | RESPIRATION RATE: 18 BRPM | OXYGEN SATURATION: 97 % | TEMPERATURE: 98 F | SYSTOLIC BLOOD PRESSURE: 153 MMHG

## 2025-09-06 LAB
ANION GAP SERPL CALC-SCNC: 12 MMOL/L — SIGNIFICANT CHANGE UP (ref 5–17)
BUN SERPL-MCNC: 32 MG/DL — HIGH (ref 7–23)
CALCIUM SERPL-MCNC: 8.9 MG/DL — SIGNIFICANT CHANGE UP (ref 8.4–10.5)
CHLORIDE SERPL-SCNC: 109 MMOL/L — HIGH (ref 96–108)
CO2 SERPL-SCNC: 21 MMOL/L — LOW (ref 22–31)
CREAT SERPL-MCNC: 1.24 MG/DL — SIGNIFICANT CHANGE UP (ref 0.5–1.3)
EGFR: 40 ML/MIN/1.73M2 — LOW
EGFR: 40 ML/MIN/1.73M2 — LOW
GLUCOSE SERPL-MCNC: 100 MG/DL — HIGH (ref 70–99)
HCT VFR BLD CALC: 28.6 % — LOW (ref 34.5–45)
HGB BLD-MCNC: 9.1 G/DL — LOW (ref 11.5–15.5)
MAGNESIUM SERPL-MCNC: 2.2 MG/DL — SIGNIFICANT CHANGE UP (ref 1.6–2.6)
MCHC RBC-ENTMCNC: 30.8 PG — SIGNIFICANT CHANGE UP (ref 27–34)
MCHC RBC-ENTMCNC: 31.8 G/DL — LOW (ref 32–36)
MCV RBC AUTO: 96.9 FL — SIGNIFICANT CHANGE UP (ref 80–100)
NRBC # BLD AUTO: 0 K/UL — SIGNIFICANT CHANGE UP (ref 0–0)
NRBC # FLD: 0 K/UL — SIGNIFICANT CHANGE UP (ref 0–0)
NRBC BLD AUTO-RTO: 0 /100 WBCS — SIGNIFICANT CHANGE UP (ref 0–0)
PHOSPHATE SERPL-MCNC: 2.8 MG/DL — SIGNIFICANT CHANGE UP (ref 2.5–4.5)
PLATELET # BLD AUTO: 145 K/UL — LOW (ref 150–400)
PMV BLD: 12.5 FL — SIGNIFICANT CHANGE UP (ref 7–13)
POTASSIUM SERPL-MCNC: 5.4 MMOL/L — HIGH (ref 3.5–5.3)
POTASSIUM SERPL-SCNC: 5.4 MMOL/L — HIGH (ref 3.5–5.3)
RBC # BLD: 2.95 M/UL — LOW (ref 3.8–5.2)
RBC # FLD: 15.5 % — HIGH (ref 10.3–14.5)
SODIUM SERPL-SCNC: 142 MMOL/L — SIGNIFICANT CHANGE UP (ref 135–145)
WBC # BLD: 8.16 K/UL — SIGNIFICANT CHANGE UP (ref 3.8–10.5)
WBC # FLD AUTO: 8.16 K/UL — SIGNIFICANT CHANGE UP (ref 3.8–10.5)

## 2025-09-06 PROCEDURE — 85610 PROTHROMBIN TIME: CPT

## 2025-09-06 PROCEDURE — C9399: CPT

## 2025-09-06 PROCEDURE — 97161 PT EVAL LOW COMPLEX 20 MIN: CPT

## 2025-09-06 PROCEDURE — 86850 RBC ANTIBODY SCREEN: CPT

## 2025-09-06 PROCEDURE — C1889: CPT

## 2025-09-06 PROCEDURE — 85027 COMPLETE CBC AUTOMATED: CPT

## 2025-09-06 PROCEDURE — 80048 BASIC METABOLIC PNL TOTAL CA: CPT

## 2025-09-06 PROCEDURE — 36415 COLL VENOUS BLD VENIPUNCTURE: CPT

## 2025-09-06 PROCEDURE — 86901 BLOOD TYPING SEROLOGIC RH(D): CPT

## 2025-09-06 PROCEDURE — 84100 ASSAY OF PHOSPHORUS: CPT

## 2025-09-06 PROCEDURE — 93005 ELECTROCARDIOGRAM TRACING: CPT

## 2025-09-06 PROCEDURE — 83735 ASSAY OF MAGNESIUM: CPT

## 2025-09-06 PROCEDURE — 85730 THROMBOPLASTIN TIME PARTIAL: CPT

## 2025-09-06 PROCEDURE — 74018 RADEX ABDOMEN 1 VIEW: CPT

## 2025-09-06 PROCEDURE — 86900 BLOOD TYPING SEROLOGIC ABO: CPT

## 2025-09-06 RX ADMIN — HEPARIN SODIUM 5000 UNIT(S): 1000 INJECTION INTRAVENOUS; SUBCUTANEOUS at 11:42

## 2025-09-06 RX ADMIN — METHOCARBAMOL 500 MILLIGRAM(S): 500 TABLET, FILM COATED ORAL at 05:17

## 2025-09-06 RX ADMIN — LOSARTAN POTASSIUM 100 MILLIGRAM(S): 100 TABLET, FILM COATED ORAL at 05:17

## 2025-09-06 RX ADMIN — Medication 650 MILLIGRAM(S): at 05:17

## 2025-09-06 RX ADMIN — Medication 650 MILLIGRAM(S): at 05:47

## 2025-09-06 RX ADMIN — METHOCARBAMOL 500 MILLIGRAM(S): 500 TABLET, FILM COATED ORAL at 11:42

## 2025-09-06 RX ADMIN — Medication 100 MICROGRAM(S): at 05:17

## 2025-09-06 RX ADMIN — Medication 650 MILLIGRAM(S): at 11:42

## 2025-09-06 RX ADMIN — HEPARIN SODIUM 5000 UNIT(S): 1000 INJECTION INTRAVENOUS; SUBCUTANEOUS at 05:16

## 2025-09-08 ENCOUNTER — NON-APPOINTMENT (OUTPATIENT)
Age: 89
End: 2025-09-08

## 2025-09-08 PROBLEM — I82.409 ACUTE EMBOLISM AND THROMBOSIS OF UNSPECIFIED DEEP VEINS OF UNSPECIFIED LOWER EXTREMITY: Chronic | Status: ACTIVE | Noted: 2025-09-01

## 2025-09-09 LAB — SURGICAL PATHOLOGY STUDY: SIGNIFICANT CHANGE UP

## 2025-09-12 ENCOUNTER — APPOINTMENT (OUTPATIENT)
Dept: COLORECTAL SURGERY | Facility: CLINIC | Age: 89
End: 2025-09-12
Payer: MEDICARE

## 2025-09-12 PROCEDURE — 99024 POSTOP FOLLOW-UP VISIT: CPT

## (undated) DEVICE — CLAMP BX HOT RAD JAW 3

## (undated) DEVICE — LIGASURE BLUNT TIP 5MM X 37CM

## (undated) DEVICE — POSITIONER FOAM EGG CRATE ULNAR 2PCS (PINK)

## (undated) DEVICE — Device

## (undated) DEVICE — LIGASURE IMPACT

## (undated) DEVICE — TROCAR COVIDIEN VERSAONE FIXATION CANNULA 5MM

## (undated) DEVICE — BIOPSY FORCEP RADIAL JAW 4 STANDARD WITH NEEDLE

## (undated) DEVICE — SUT CHROMIC 1 30" GS-21

## (undated) DEVICE — PACK COLON BUNDLE

## (undated) DEVICE — FOLEY HOLDER STATLOCK 2 WAY ADULT

## (undated) DEVICE — SOL IRR POUR NS 0.9% 500ML

## (undated) DEVICE — DISSECTOR ENDO PEANUT 5MM

## (undated) DEVICE — FEEDING TUBE NG SUMP 16FR 48"

## (undated) DEVICE — TROCAR APPLIED MEDICAL KII BALLOON BLUNT TIP 12MM X 100MM

## (undated) DEVICE — ELCTR GROUNDING PAD ADULT COVIDIEN

## (undated) DEVICE — IRRIGATOR BIO SHIELD

## (undated) DEVICE — VENODYNE/SCD SLEEVE CALF MEDIUM

## (undated) DEVICE — GOWN TRIMAX LG

## (undated) DEVICE — DRSG TAPE UMBILICAL COTTON 2" X 30 X 1/8"

## (undated) DEVICE — SENSOR O2 FINGER ADULT

## (undated) DEVICE — SPECIMEN CONTAINER 100ML

## (undated) DEVICE — SUT CHROMIC 3-0 30" V-20

## (undated) DEVICE — TUBING STRYKEFLOW II SUCTION / IRRIGATOR

## (undated) DEVICE — TUBING IV SET GRAVITY 3Y 100" MACRO

## (undated) DEVICE — FORCEP RADIAL JAW 4 JUMBO 2.8MM 3.2MM 240CM ORANGE DISP

## (undated) DEVICE — TROCAR COVIDIEN VERSAPORT BLADELESS OPTICAL 5MM STANDARD

## (undated) DEVICE — ELCTR BOVIE PENCIL SMOKE EVACUATION

## (undated) DEVICE — BLADE SCALPEL SAFETYLOCK #10

## (undated) DEVICE — TUBING SUCTION 20FT

## (undated) DEVICE — VISITEC 4X4

## (undated) DEVICE — POLY TRAP ETRAP

## (undated) DEVICE — BLADE SCALPEL SAFETYLOCK #15

## (undated) DEVICE — FOLEY TRAY 16FR 5CC LTX UMETER CLOSED

## (undated) DEVICE — DRAPE TOWEL BLUE 17" X 24"

## (undated) DEVICE — SOL IRR POUR H2O 250ML

## (undated) DEVICE — DRAPE GENERAL ENDOSCOPY

## (undated) DEVICE — WARMING BLANKET UPPER ADULT

## (undated) DEVICE — SYR LUER LOK 50CC

## (undated) DEVICE — SNARE CAPTIVATOR II 10MM

## (undated) DEVICE — DRSG OPSITE 2.5 X 2"

## (undated) DEVICE — SUCTION YANKAUER NO CONTROL VENT

## (undated) DEVICE — DRSG OPSITE 13.75 X 4"

## (undated) DEVICE — CATH IV SAFE BC 22G X 1" (BLUE)

## (undated) DEVICE — VALVE YELLOW PORT SEAL PLUS 5MM

## (undated) DEVICE — CATH IV SAFE BC 20G X 1.16" (PINK)

## (undated) DEVICE — SUT POLYSORB 3-0 30" V-20 UNDYED

## (undated) DEVICE — BRUSH COLONOSCOPY CYTOLOGY

## (undated) DEVICE — PREP CHLORAPREP HI-LITE ORANGE 26ML

## (undated) DEVICE — PACK IV START WITH CHG

## (undated) DEVICE — LAP PAD 18 X 18"

## (undated) DEVICE — DRSG CURITY GAUZE SPONGE 4 X 4" 12-PLY

## (undated) DEVICE — TUBING SUCTION CONN 6FT STERILE

## (undated) DEVICE — D HELP - CLEARVIEW CLEARIFY SYSTEM

## (undated) DEVICE — SOL INJ NS 0.9% 500ML 2 PORT

## (undated) DEVICE — PACK MAJOR ABDOMINAL SUPINE

## (undated) DEVICE — STAPLER SKIN VISI-STAT 35 WIDE

## (undated) DEVICE — DRSG TELFA 3 X 8

## (undated) DEVICE — SUT MAXON 1 36" GS-24

## (undated) DEVICE — TUBING INSUFFLATION LAP FILTER 10FT